# Patient Record
Sex: FEMALE | Race: BLACK OR AFRICAN AMERICAN | Employment: OTHER | URBAN - METROPOLITAN AREA
[De-identification: names, ages, dates, MRNs, and addresses within clinical notes are randomized per-mention and may not be internally consistent; named-entity substitution may affect disease eponyms.]

---

## 2017-01-04 RX ORDER — CYCLOBENZAPRINE HCL 5 MG
TABLET ORAL
Qty: 30 TAB | Refills: 3 | Status: SHIPPED | OUTPATIENT
Start: 2017-01-04 | End: 2019-04-19

## 2017-01-11 RX ORDER — ROSUVASTATIN CALCIUM 10 MG/1
TABLET, COATED ORAL
Qty: 30 TAB | Refills: 11 | Status: SHIPPED | OUTPATIENT
Start: 2017-01-11 | End: 2018-01-24 | Stop reason: SDUPTHER

## 2017-01-11 RX ORDER — SPIRONOLACTONE 25 MG/1
TABLET ORAL
Qty: 30 TAB | Refills: 5 | Status: SHIPPED | OUTPATIENT
Start: 2017-01-11 | End: 2017-08-07 | Stop reason: SDUPTHER

## 2017-01-11 RX ORDER — AMLODIPINE BESYLATE 5 MG/1
TABLET ORAL
Qty: 30 TAB | Refills: 5 | Status: SHIPPED | OUTPATIENT
Start: 2017-01-11 | End: 2017-08-07 | Stop reason: SDUPTHER

## 2017-03-24 ENCOUNTER — OFFICE VISIT (OUTPATIENT)
Dept: INTERNAL MEDICINE CLINIC | Age: 67
End: 2017-03-24

## 2017-03-24 VITALS
RESPIRATION RATE: 16 BRPM | HEIGHT: 63 IN | HEART RATE: 80 BPM | DIASTOLIC BLOOD PRESSURE: 81 MMHG | BODY MASS INDEX: 40.72 KG/M2 | OXYGEN SATURATION: 99 % | WEIGHT: 229.8 LBS | TEMPERATURE: 97.9 F | SYSTOLIC BLOOD PRESSURE: 119 MMHG

## 2017-03-24 DIAGNOSIS — E78.5 HYPERLIPIDEMIA, UNSPECIFIED HYPERLIPIDEMIA TYPE: ICD-10-CM

## 2017-03-24 DIAGNOSIS — I63.9 CEREBROVASCULAR ACCIDENT (CVA), UNSPECIFIED MECHANISM (HCC): Primary | ICD-10-CM

## 2017-03-24 DIAGNOSIS — R55 SYNCOPE AND COLLAPSE: ICD-10-CM

## 2017-03-24 DIAGNOSIS — I10 ESSENTIAL HYPERTENSION: ICD-10-CM

## 2017-03-24 DIAGNOSIS — M10.9 GOUT, UNSPECIFIED CAUSE, UNSPECIFIED CHRONICITY, UNSPECIFIED SITE: ICD-10-CM

## 2017-03-24 RX ORDER — MOMETASONE FUROATE 1 MG/G
CREAM TOPICAL DAILY
Qty: 45 G | Refills: 5 | Status: SHIPPED | OUTPATIENT
Start: 2017-03-24 | End: 2018-04-13 | Stop reason: SDUPTHER

## 2017-03-24 NOTE — PATIENT INSTRUCTIONS
Learning About Healthy Weight  What is a healthy weight? A healthy weight is the weight at which you feel good about yourself and have energy for work and play. It's also one that lowers your risk for health problems. What can you do to stay at a healthy weight? It can be hard to stay at a healthy weight, especially when fast food, vending-machine snacks, and processed foods are so easy to find. And with your busy lifestyle, activity may be low on your list of things to do. But staying at a healthy weight may be easier than you think. Here are some dos and don'ts for staying at a healthy weight:  Do eat healthy foods  The kinds of foods you eat have a big impact on both your weight and your health. Reaching and staying at a healthy weight is not about going on a diet. It's about making healthier food choices every day and changing your diet for good. Healthy eating means eating a variety of foods so that you get all the nutrients you need. Your body needs protein, carbohydrate, and fats for energy. They keep your heart beating, your brain active, and your muscles working. On most days, try to eat from each food group. This means eating a variety of:  · Whole grains, such as whole wheat breads and pastas. · Fruits and vegetables. · Dairy products, such as low-fat milk, yogurt, and cheese. · Lean proteins, such as all types of fish, chicken without the skin, and beans. Don't have too much or too little of one thing. All foods, if eaten in moderation, can be part of healthy eating. Even sweets can be okay. If your favorite foods are high in fat, salt, sugar, or calories, limit how often you eat them. Eat smaller servings, or look for healthy substitutes. Do watch what you eat  Many people eat more than their bodies need. Part of staying at a healthy weight means learning how much food you really need from day to day and not eating more than that.  Even with healthy foods, eating too much can make you gain weight. Having a well-balanced diet means that you eat enough, but not too much, and that your food gives you the nutrients you need to stay healthy. So listen to your body. Eat when you're hungry. Stop when you feel satisfied. It's a good idea to have healthy snacks ready for when you get hungry. Keep healthy snacks with you at work, in your car, and at home. If you have a healthy snack easily available, you'll be less likely to pick a candy bar or bag of chips from a vending machine instead. Some healthy snacks you might want to keep on hand are fruit, low-fat yogurt, string cheese, low-fat microwave popcorn, raisins and other dried fruit, nuts, whole wheat crackers, pretzels, carrots, celery sticks, and broccoli. Do some physical activity  A big part of reaching and staying at a healthy weight is being active. When you're active, you burn calories. This makes it easier to reach and stay at a healthy weight. When you're active on a regular basis, your body burns more calories, even when you're at rest. Being active helps you lose fat and build lean muscle. Try to be active for at least 1 hour every day. This may sound like a lot, but it's okay to be active in smaller blocks of time that add up to 1 hour a day. Any activity that makes your heart beat faster and keeps it there for a while counts. A brisk walk, run, or swim will get your heart beating faster. So will climbing stairs, shooting baskets, or cycling. Even some household chores like vacuuming and mowing the lawn will get your heart rate up. Pick activities that you enjoyones that make your heart beat faster, your muscles stronger, and your muscles and joints more flexible. If you find more than one thing you like doing, do them all. You don't have to do the same thing every day. Don't diet  Diets don't work. Diets are temporary. Because you give up so much when you diet, you may be hungry and think about food all the time.  And after you stop dieting, you also may overeat to make up for what you missed. Most people who diet end up gaining back the pounds they lostand more. Remember that healthy bodies come in lots of shapes and sizes. Everyone can get healthier by eating better and being more active. Where can you learn more? Go to http://lanre-jarocho.info/. Enter 062 4782 in the search box to learn more about \"Learning About Healthy Weight. \"  Current as of: February 16, 2016  Content Version: 11.1  © 5939-8767 web care [employer]. Care instructions adapted under license by [employer] (which disclaims liability or warranty for this information). If you have questions about a medical condition or this instruction, always ask your healthcare professional. [address] any warranty or liability for your use of this information.

## 2017-03-24 NOTE — PROGRESS NOTES
1. Have you been to the ER, urgent care clinic since your last visit? Hospitalized since your last visit?no    2. Have you seen or consulted any other health care providers outside of the [address] [place] [address] since your last visit? Include any pap smears or colon screening.  no

## 2017-03-24 NOTE — MR AVS SNAPSHOT
Visit Information Date & Time Provider Department Dept. Phone Encounter #  
 3/24/2017  9:45 AM Demetri Velasco, [address] [place] [address],[address] [phone] [id number] Follow-up Instructions Return in about 6 months (around 9/24/2017) for HTN, etc.  
 Follow-up and Disposition History Your Appointments 5/26/2017  1:40 PM  
Follow Up with Asad Potts MD  
Neurology [facility] [address] ([address]) Appt Note: follow up for stroke. ..tlw 12/27/16; follow up for stroke. ..tlw 12/27/16     resc ff[place] [id number] office  sld 3/18/17 [place] [address] [facility] [id number] [phone]  
  
   
 [place] [address] [id number] [address] [place] [address] Upcoming Health Maintenance Date Due COLONOSCOPY 4/10/1967 GLAUCOMA SCREENING Q2Y 4/10/2014 OSTEOPOROSIS SCREENING (DEXA) 4/10/2014 MEDICARE YEARLY EXAM 4/10/2014 BREAST CANCER SCRN MAMMOGRAM 6/1/2014 Pneumococcal 65+ Low/Medium Risk (2 of 2 - PPSV23) 3/24/2018 DTaP/Tdap/Td series (2 - Td) 3/24/2027 Allergies as of 3/24/2017  Review Complete On: 3/24/2017 By: Demetri Velasco MD  
  
 Severity Noted Reaction Type Reactions Contrast Agent [Iodine]  09/23/2011    Hives Lipitor [Atorvastatin]  10/03/2014    Myalgia Pravastatin  05/24/2013    Rash Tramadol  12/15/2015    Other (comments) Caused seizure Current Immunizations  Never Reviewed No immunizations on file. Not reviewed this visit You Were Diagnosed With   
  
 Codes Comments Cerebrovascular accident (CVA), unspecified mechanism ([facility] [address]    -  Primary ICD-10-CM: I63.9 ICD-9-CM: 434.91 Syncope and collapse     ICD-10-CM: R55 
ICD-9-CM: 780.2 Gout, unspecified cause, unspecified chronicity, unspecified site     ICD-10-CM: M10.9 ICD-9-CM: 274.9 Essential hypertension     ICD-10-CM: I10 
ICD-9-CM: 401.9 Hyperlipidemia, unspecified hyperlipidemia type     ICD-10-CM: E78.5 ICD-9-CM: 272.4 Vitals BP Pulse Temp Resp Height(growth percentile) Weight(growth percentile) 119/81 (BP 1 Location: Left arm, BP Patient Position: Sitting) 80 97.9 °F (36.6 °C) (Oral) 16 5' 3\" (1.6 m) 229 lb 12.8 oz (104.2 kg) SpO2 BMI OB Status Smoking Status 99% 40.71 kg/m2 Hysterectomy Never Smoker Vitals History BMI and BSA Data Body Mass Index Body Surface Area 40.71 kg/m 2 2.15 m 2 Preferred Pharmacy Pharmacy Name Phone [id number] [address] [address] [place] [phone] Your Updated Medication List  
  
   
This list is accurate as of: 3/24/17 11:59 PM.  Always use your most recent med list. amLODIPine 5 mg tablet Commonly known as:  NORVASC  
take 1 tablet by mouth once daily  
  
 aspirin delayed-release 81 mg tablet Take 81 mg by mouth daily. colchicine 0.6 mg tablet Commonly known as:  COLCRYS Take 1 tablet by mouth two (2) times a day. * cyclobenzaprine 10 mg tablet Commonly known as:  FLEXERIL Take 1 Tab by mouth three (3) times daily as needed for Muscle Spasm(s). * cyclobenzaprine 5 mg tablet Commonly known as:  FLEXERIL  
take 1 tablet by mouth three times a day if needed for muscle spasm HYDROcodone-acetaminophen  mg tablet Commonly known as:  [address] Take 1 Tab by mouth every six (6) hours as needed for Pain. Max Daily Amount: 4 Tabs. levETIRAcetam 500 mg tablet Commonly known as:  KEPPRA Take 2 Tabs by mouth two (2) times a day. losartan 100 mg tablet Commonly known as:  COZAAR Take 1 Tab by mouth daily. mineral oil-hydrophil petrolat ointment Commonly known as:  AQUAPHOR Apply  to affected area as needed for Dry Skin.  
  
 mometasone 0.1 % topical cream  
Commonly known as:  [place] F[place] Apply  to affected area daily. naproxen 500 mg tablet Commonly known as:  NAPROSYN  
 Take 1 Tab by mouth two (2) times daily (with meals). ofloxacin 0.3 % ophthalmic solution Commonly known as:  FLOXIN  
instill 1 drop into affected eye four times a day START 2 DAYS BEFORE SURGERY  
  
 prednisoLONE acetate 1 % ophthalmic suspension Commonly known as:  PRED FORTE  
instill 1 drop into affected eye four times a day AFTER SURGERY FOR 1 WEEK THEN TAPER AS DIRECTED  
  
 rosuvastatin 10 mg tablet Commonly known as:  CRESTOR  
take 1 tablet by mouth once daily  
  
 spironolactone 25 mg tablet Commonly known as:  ALDACTONE  
take 1 tablet by mouth once daily  
  
 timolol 0.25 % ophthalmic solution Commonly known as:  Marlee Revering Administer 1-2 drops to both eyes two (2) times a day. use in affected eye(s)  
  
 triamcinolone acetonide 0.1 % ointment Commonly known as:  KENALOG Apply  to affected area two (2) times a day. use thin layer * Notice: This list has 2 medication(s) that are the same as other medications prescribed for you. Read the directions carefully, and ask your doctor or other care provider to review them with you. Prescriptions Sent to Pharmacy Refills  
 mometasone (ELOCON) 0.1 % topical cream 5 Sig: Apply  to affected area daily. Class: Normal  
 Pharmacy: [facility] [address] [place] [address] #: [phone] Route: Topical  
  
We Performed the Following CBC WITH AUTOMATED DIFF [00594 CPT(R)] LIPID PANEL [07662 CPT(R)] METABOLIC PANEL, COMPREHENSIVE [87245 CPT(R)] URIC ACID [id number] CPT(R)] Follow-up Instructions Return in about 6 months (around 9/24/2017) for HTN, etc.  
  
  
Patient Instructions Learning About Healthy Weight What is a healthy weight? A healthy weight is the weight at which you feel good about yourself and have energy for work and play. It's also one that lowers your risk for health problems. What can you do to stay at a healthy weight? It can be hard to stay at a healthy weight, especially when fast food, vending-machine snacks, and processed foods are so easy to find. And with your busy lifestyle, activity may be low on your list of things to do. But staying at a healthy weight may be easier than you think. Here are some dos and don'ts for staying at a healthy weight: 
Do eat healthy foods The kinds of foods you eat have a big impact on both your weight and your health. Reaching and staying at a healthy weight is not about going on a diet. It's about making healthier food choices every day and changing your diet for good. Healthy eating means eating a variety of foods so that you get all the nutrients you need. Your body needs protein, carbohydrate, and fats for energy. They keep your heart beating, your brain active, and your muscles working. On most days, try to eat from each food group. This means eating a variety of: · Whole grains, such as whole wheat breads and pastas. · Fruits and vegetables. · Dairy products, such as low-fat milk, yogurt, and cheese. · Lean proteins, such as all types of fish, chicken without the skin, and beans. Don't have too much or too little of one thing. All foods, if eaten in moderation, can be part of healthy eating. Even sweets can be okay. If your favorite foods are high in fat, salt, sugar, or calories, limit how often you eat them. Eat smaller servings, or look for healthy substitutes. Do watch what you eat Many people eat more than their bodies need. Part of staying at a healthy weight means learning how much food you really need from day to day and not eating more than that. Even with healthy foods, eating too much can make you gain weight. Having a well-balanced diet means that you eat enough, but not too much, and that your food gives you the nutrients you need to stay healthy. So listen to your body. Eat when you're hungry. Stop when you feel satisfied. It's a good idea to have healthy snacks ready for when you get hungry. Keep healthy snacks with you at work, in your car, and at home. If you have a healthy snack easily available, you'll be less likely to pick a candy bar or bag of chips from a vending machine instead. Some healthy snacks you might want to keep on hand are fruit, low-fat yogurt, string cheese, low-fat microwave popcorn, raisins and other dried fruit, nuts, whole wheat crackers, pretzels, carrots, celery sticks, and broccoli. Do some physical activity A big part of reaching and staying at a healthy weight is being active. When you're active, you burn calories. This makes it easier to reach and stay at a healthy weight. When you're active on a regular basis, your body burns more calories, even when you're at rest. Being active helps you lose fat and build lean muscle. Try to be active for at least 1 hour every day. This may sound like a lot, but it's okay to be active in smaller blocks of time that add up to 1 hour a day. Any activity that makes your heart beat faster and keeps it there for a while counts. A brisk walk, run, or swim will get your heart beating faster. So will climbing stairs, shooting baskets, or cycling. Even some household chores like vacuuming and mowing the lawn will get your heart rate up. Pick activities that you enjoyones that make your heart beat faster, your muscles stronger, and your muscles and joints more flexible. If you find more than one thing you like doing, do them all. You don't have to do the same thing every day. Don't diet Diets don't work. Diets are temporary. Because you give up so much when you diet, you may be hungry and think about food all the time. And after you stop dieting, you also may overeat to make up for what you missed. Most people who diet end up gaining back the pounds they lostand more. Remember that healthy bodies come in lots of shapes and sizes.  Everyone can get healthier by eating better and being more active. Where can you learn more? Go to http://lanre-jarocho.info/. Enter 449 4149 in the search box to learn more about \"Learning About Healthy Weight. \" Current as of: February 16, 2016 Content Version: 11.1 © 8192-3733 [employer] Incorporated. Care instructions adapted under license by [employer] (which disclaims liability or warranty for this information). If you have questions about a medical condition or this instruction, always ask your healthcare professional. [address] any warranty or liability for your use of this information. Introducing [facility]! Carmen Shaikh introduces [employer] patient portal. Now you can access parts of your medical record, email your doctor's office, and request medication refills online. 1. In your internet browser, go to [email or web address] 2. Click on the First Time User? Click Here link in the Sign In box. You will see the New Member Sign Up page. 3. Enter your [employer] Access Code exactly as it appears below. You will not need to use this code after youve completed the sign-up process. If you do not sign up before the expiration date, you must request a new code. · [employer] Access Code: [email or web address] Expires: 6/22/2017 10:41 AM 
 
4. Enter the last four digits of your Social Security Number (xxxx) and Date of Birth (mm/dd/yyyy) as indicated and click Submit. You will be taken to the next sign-up page. 5. Create a [employer] ID. This will be your [employer] login ID and cannot be changed, so think of one that is secure and easy to remember. 6. Create a [employer] password. You can change your password at any time. 7. Enter your Password Reset Question and Answer. This can be used at a later time if you forget your password. 8. Enter your e-mail address. You will receive e-mail notification when new information is available in [address] 9. Click Sign Up. You can now view and download portions of your medical record. 10. Click the Download Summary menu link to download a portable copy of your medical information. If you have questions, please visit the Frequently Asked Questions section of the [employer] website. Remember, [employer] is NOT to be used for urgent needs. For medical emergencies, dial 911. Now available from your iPhone and Android! Please provide this summary of care documentation to your next provider. Your primary care clinician is listed as [place]. If you have any questions after today's visit, please call [phone].
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
## 2017-04-05 ENCOUNTER — TELEPHONE (OUTPATIENT)
Dept: INTERNAL MEDICINE CLINIC | Age: 67
End: 2017-04-05

## 2017-05-26 ENCOUNTER — OFFICE VISIT (OUTPATIENT)
Dept: NEUROLOGY | Age: 67
End: 2017-05-26

## 2017-05-26 VITALS
HEIGHT: 63 IN | RESPIRATION RATE: 12 BRPM | BODY MASS INDEX: 39.87 KG/M2 | OXYGEN SATURATION: 98 % | WEIGHT: 225 LBS | DIASTOLIC BLOOD PRESSURE: 80 MMHG | SYSTOLIC BLOOD PRESSURE: 132 MMHG | HEART RATE: 93 BPM

## 2017-05-26 DIAGNOSIS — I63.9 CEREBROVASCULAR ACCIDENT (CVA), UNSPECIFIED MECHANISM (HCC): ICD-10-CM

## 2017-05-26 DIAGNOSIS — R56.1 POST-TRAUMATIC SEIZURES (HCC): Primary | ICD-10-CM

## 2017-05-26 DIAGNOSIS — M54.50 ACUTE MIDLINE LOW BACK PAIN WITHOUT SCIATICA: ICD-10-CM

## 2017-05-26 DIAGNOSIS — R56.1 POST-TRAUMATIC SEIZURES (HCC): ICD-10-CM

## 2017-05-26 DIAGNOSIS — I63.9 CEREBROVASCULAR ACCIDENT (CVA), UNSPECIFIED MECHANISM (HCC): Primary | ICD-10-CM

## 2017-05-26 RX ORDER — GABAPENTIN 300 MG/1
300 CAPSULE ORAL
Qty: 30 CAP | Refills: 5 | Status: SHIPPED | OUTPATIENT
Start: 2017-05-26 | End: 2019-04-19

## 2017-05-26 RX ORDER — LEVETIRACETAM 500 MG/1
500 TABLET ORAL 2 TIMES DAILY
Qty: 60 TAB | Refills: 5
Start: 2017-05-26 | End: 2017-07-08 | Stop reason: SDUPTHER

## 2017-05-26 NOTE — MR AVS SNAPSHOT
Visit Information Date & Time Provider Department Dept. Phone Encounter #  
 5/26/2017  1:40 PM Sylvain Brennan MD Neurology [address] [phone] [id number] Your Appointments 5/26/2017  1:40 PM  
Follow Up with Sylvain Brennan MD  
Neurology [address] ([facility]) Appt Note: follow up for stroke. ..tlw 12/27/16; follow up for stroke. ..tlw 12/27/16     [facility]  sld 3/18/17 [place] [address] [facility] [address] [address] [phone]  
  
   
 [place] [address] [id number] [address] [place] Upcoming Health Maintenance Date Due COLONOSCOPY 4/10/1967 GLAUCOMA SCREENING Q2Y 4/10/2014 OSTEOPOROSIS SCREENING (DEXA) 4/10/2014 MEDICARE YEARLY EXAM 4/10/2014 BREAST CANCER SCRN MAMMOGRAM 6/1/2014 INFLUENZA AGE 9 TO ADULT 8/1/2017 Pneumococcal 65+ Low/Medium Risk (2 of 2 - PPSV23) 3/24/2018 DTaP/Tdap/Td series (2 - Td) 3/24/2027 Allergies as of 5/26/2017  Review Complete On: 5/26/2017 By: Lucia Palma Severity Noted Reaction Type Reactions Contrast Agent [Iodine]  09/23/2011    Hives Lipitor [Atorvastatin]  10/03/2014    Myalgia Pravastatin  05/24/2013    Rash Tramadol  12/15/2015    Other (comments) Caused seizure Current Immunizations  Never Reviewed No immunizations on file. Not reviewed this visit You Were Diagnosed With   
  
 Codes Comments Cerebrovascular accident (CVA), unspecified mechanism ([address]    -  Primary ICD-10-CM: I63.9 ICD-9-CM: 434.91 Post-traumatic seizures ([facility] [address]     ICD-10-CM: R56.1 ICD-9-CM: 780.33 Acute midline low back pain without sciatica     ICD-10-CM: M54.5 ICD-9-CM: 724.2 Vitals BP Pulse Resp Height(growth percentile) Weight(growth percentile) SpO2  
 132/80 93 12 5' 3\" (1.6 m) 225 lb (102.1 kg) 98% BMI OB Status Smoking Status 39.86 kg/m2 Hysterectomy Never Smoker Vitals History BMI and BSA Data Body Mass Index Body Surface Area  
 39.86 kg/m 2 2.13 m 2 Preferred Pharmacy Pharmacy Name Phone [id number] [facility] [place] [address] [place] [phone] Your Updated Medication List  
  
   
This list is accurate as of: 5/26/17  1:35 PM.  Always use your most recent med list. amLODIPine 5 mg tablet Commonly known as:  NORVASC  
take 1 tablet by mouth once daily  
  
 aspirin delayed-release 81 mg tablet Take 81 mg by mouth daily. colchicine 0.6 mg tablet Commonly known as:  COLCRYS Take 1 tablet by mouth two (2) times a day. * cyclobenzaprine 10 mg tablet Commonly known as:  FLEXERIL Take 1 Tab by mouth three (3) times daily as needed for Muscle Spasm(s). * cyclobenzaprine 5 mg tablet Commonly known as:  FLEXERIL  
take 1 tablet by mouth three times a day if needed for muscle spasm  
  
 gabapentin 300 mg capsule Commonly known as:  NEURONTIN Take 1 Cap by mouth nightly. HYDROcodone-acetaminophen  mg tablet Commonly known as:  Alexandr Nigh Take 1 Tab by mouth every six (6) hours as needed for Pain. Max Daily Amount: 4 Tabs. levETIRAcetam 500 mg tablet Commonly known as:  KEPPRA Take 1 Tab by mouth two (2) times a day. LISINOPRIL PO Take  by mouth.  
  
 losartan 100 mg tablet Commonly known as:  COZAAR Take 1 Tab by mouth daily. mineral oil-hydrophil petrolat ointment Commonly known as:  AQUAPHOR Apply  to affected area as needed for Dry Skin.  
  
 mometasone 0.1 % topical cream  
Commonly known as:  Sammy New Apply  to affected area daily. naproxen 500 mg tablet Commonly known as:  NAPROSYN Take 1 Tab by mouth two (2) times daily (with meals). ofloxacin 0.3 % ophthalmic solution Commonly known as:  FLOXIN  
instill 1 drop into affected eye four times a day START 2 DAYS BEFORE SURGERY prednisoLONE acetate 1 % ophthalmic suspension Commonly known as:  PRED FORTE  
instill 1 drop into affected eye four times a day AFTER SURGERY FOR 1 WEEK THEN TAPER AS DIRECTED  
  
 rosuvastatin 10 mg tablet Commonly known as:  CRESTOR  
take 1 tablet by mouth once daily  
  
 spironolactone 25 mg tablet Commonly known as:  ALDACTONE  
take 1 tablet by mouth once daily  
  
 timolol 0.25 % ophthalmic solution Commonly known as:  Shila Carl Administer 1-2 drops to both eyes two (2) times a day. use in affected eye(s)  
  
 triamcinolone acetonide 0.1 % ointment Commonly known as:  KENALOG Apply  to affected area two (2) times a day. use thin layer * Notice: This list has 2 medication(s) that are the same as other medications prescribed for you. Read the directions carefully, and ask your doctor or other care provider to review them with you. Prescriptions Sent to Pharmacy Refills  
 gabapentin (NEURONTIN) 300 mg capsule 5 Sig: Take 1 Cap by mouth nightly. Class: Normal  
 [id number] [address] [place] [address] Ph #: [phone] Route: Oral  
  
To-Do List   
 05/27/2017 Imaging:  DUPLEX CAROTID BILATERAL AMB NEURO   
  
 05/27/2017 Imaging:  MRI LUMB SPINE W WO CONT Patient Instructions A Healthy Lifestyle: Care Instructions Your Care Instructions A healthy lifestyle can help you feel good, stay at a healthy weight, and have plenty of energy for both work and play. A healthy lifestyle is something you can share with your whole family. A healthy lifestyle also can lower your risk for serious health problems, such as high blood pressure, heart disease, and diabetes. You can follow a few steps listed below to improve your health and the health of your family. Follow-up care is a key part of your treatment and safety.  Be sure to make and go to all appointments, and call your doctor if you are having problems. Its also a good idea to know your test results and keep a list of the medicines you take. How can you care for yourself at home? · Do not eat too much sugar, fat, or fast foods. You can still have dessert and treats now and then. The goal is moderation. · Start small to improve your eating habits. Pay attention to portion sizes, drink less juice and soda pop, and eat more fruits and vegetables. ¨ Eat a healthy amount of food. A 3-ounce serving of meat, for example, is about the size of a deck of cards. Fill the rest of your plate with vegetables and whole grains. ¨ Limit the amount of soda and sports drinks you have every day. Drink more water when you are thirsty. ¨ Eat at least 5 servings of fruits and vegetables every day. It may seem like a lot, but it is not hard to reach this goal. A serving or helping is 1 piece of fruit, 1 cup of vegetables, or 2 cups of leafy, raw vegetables. Have an apple or some carrot sticks as an afternoon snack instead of a candy bar. Try to have fruits and/or vegetables at every meal. 
· Make exercise part of your daily routine. You may want to start with simple activities, such as walking, bicycling, or slow swimming. Try to be active 30 to 60 minutes every day. You do not need to do all 30 to 60 minutes all at once. For example, you can exercise 3 times a day for 10 or 20 minutes. Moderate exercise is safe for most people, but it is always a good idea to talk to your doctor before starting an exercise program. 
· Keep moving. Cm Rafa the lawn, work in the garden, or [employer]. Take the stairs instead of the elevator at work. · If you smoke, quit. People who smoke have an increased risk for heart attack, stroke, cancer, and other lung illnesses. Quitting is hard, but there are ways to boost your chance of quitting tobacco for good. ¨ Use nicotine gum, patches, or lozenges. ¨ Ask your doctor about stop-smoking programs and medicines. ¨ Keep trying. In addition to reducing your risk of diseases in the future, you will notice some benefits soon after you stop using tobacco. If you have shortness of breath or asthma symptoms, they will likely get better within a few weeks after you quit. · Limit how much alcohol you drink. Moderate amounts of alcohol (up to 2 drinks a day for men, 1 drink a day for women) are okay. But drinking too much can lead to liver problems, high blood pressure, and other health problems. Family health If you have a family, there are many things you can do together to improve your health. · Eat meals together as a family as often as possible. · Eat healthy foods. This includes fruits, vegetables, lean meats and dairy, and whole grains. · Include your family in your fitness plan. Most people think of activities such as jogging or tennis as the way to fitness, but there are many ways you and your family can be more active. Anything that makes you breathe hard and gets your heart pumping is exercise. Here are some tips: 
¨ Walk to do errands or to take your child to school or the bus. ¨ Go for a family bike ride after dinner instead of watching TV. Where can you learn more? Go to http://lanre-jarocho.info/. Enter D402 in the search box to learn more about \"A Healthy Lifestyle: Care Instructions. \" Current as of: July 26, 2016 Content Version: 11.2 © 8836-6944 Healthwise, Incorporated. Care instructions adapted under license by [employer] (which disclaims liability or warranty for this information). If you have questions about a medical condition or this instruction, always ask your healthcare professional. [place] [address] any warranty or liability for your use of this information. Introducing [facility] [place] [facility]! Raissa Huerta introduces Cellufun patient portal. Now you can access parts of your medical record, email your doctor's office, and request medication refills online. 1. In your internet browser, go to [email or web address] 2. Click on the First Time User? Click Here link in the Sign In box. You will see the New Member Sign Up page. 3. Enter your Stockr Access Code exactly as it appears below. You will not need to use this code after youve completed the sign-up process. If you do not sign up before the expiration date, you must request a new code. · Stockr Access Code: [email or web address] Expires: 6/22/2017 10:41 AM 
 
4. Enter the last four digits of your Social Security Number (xxxx) and Date of Birth (mm/dd/yyyy) as indicated and click Submit. You will be taken to the next sign-up page. 5. Create a Telestreamt ID. This will be your Stockr login ID and cannot be changed, so think of one that is secure and easy to remember. 6. Create a Stockr password. You can change your password at any time. 7. Enter your Password Reset Question and Answer. This can be used at a later time if you forget your password. 8. Enter your e-mail address. You will receive e-mail notification when new information is available in [address] 9. Click Sign Up. You can now view and download portions of your medical record. 10. Click the Download Summary menu link to download a portable copy of your medical information. If you have questions, please visit the Frequently Asked Questions section of the Stockr website. Remember, Stockr is NOT to be used for urgent needs. For medical emergencies, dial 911. Now available from your iPhone and Android! Please provide this summary of care documentation to your next provider. Your primary care clinician is listed as [place]. If you have any questions after today's visit, please call [phone].
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
## 2017-05-26 NOTE — PROCEDURES
Carotid Doppler:     Date:  05/26/17    Requesting Physician:  Angelique Gusman     Indication:  Stroke. B-mode imaging reveals mixed plaquing at the bifurcations extending into the proximal internal carotid artery segments bilaterally. Doppler spectral analysis reveals no significantly elevated velocity. Vertebral artery flow antegrade bilaterally. Interpretation:  Plaque as noted. Stenosis estimated at 16-49% bilateral ICA.
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
## 2017-05-26 NOTE — PATIENT INSTRUCTIONS

## 2017-06-16 ENCOUNTER — HOSPITAL ENCOUNTER (OUTPATIENT)
Dept: MRI IMAGING | Age: 67
Discharge: HOME | End: 2017-06-16
Attending: PSYCHIATRY & NEUROLOGY
Payer: COMMERCIAL

## 2017-06-16 DIAGNOSIS — R56.1 POST-TRAUMATIC SEIZURES (HCC): ICD-10-CM

## 2017-06-16 DIAGNOSIS — I63.9 CEREBROVASCULAR ACCIDENT (CVA), UNSPECIFIED MECHANISM (HCC): ICD-10-CM

## 2017-06-16 DIAGNOSIS — M54.50 ACUTE MIDLINE LOW BACK PAIN WITHOUT SCIATICA: ICD-10-CM

## 2017-06-16 PROCEDURE — 72148 MRI LUMBAR SPINE W/O DYE: CPT

## 2017-06-27 ENCOUNTER — TELEPHONE (OUTPATIENT)
Dept: NEUROLOGY | Age: 67
End: 2017-06-27

## 2017-06-27 DIAGNOSIS — M54.50 ACUTE MIDLINE LOW BACK PAIN WITHOUT SCIATICA: Primary | ICD-10-CM

## 2017-06-28 NOTE — TELEPHONE ENCOUNTER
MRI of the lumbar spine did show several areas of disc disease. At one level there is definite narrowing. This could be causing patient's pain and weakness. If she would like, we can refer her to neurosurgery for evaluation.

## 2017-06-29 ENCOUNTER — TELEPHONE (OUTPATIENT)
Dept: NEUROLOGY | Age: 67
End: 2017-06-29

## 2017-06-29 NOTE — TELEPHONE ENCOUNTER
Contacted patient and informed her of results. She would like a referral to Dr. Valentin Salmon. Provided patient Dr. Hernán Wheatley phone number and advised she call if she does not receive a call. She voiced understanding and will call back if any further questions or concerns. Referral and clinicals faxed to Dr. Hernán Wheatley office.

## 2017-06-29 NOTE — TELEPHONE ENCOUNTER
Spoke with patient and provided her results again. Advised her to contact the radiology dept to obtain the disk with imaging. She voiced understanding and will call back if any further questions or concerns.

## 2017-07-09 RX ORDER — LEVETIRACETAM 500 MG/1
TABLET ORAL
Qty: 120 TAB | Refills: 5 | Status: SHIPPED | OUTPATIENT
Start: 2017-07-09 | End: 2017-07-18

## 2017-07-18 ENCOUNTER — APPOINTMENT (OUTPATIENT)
Dept: CT IMAGING | Age: 67
End: 2017-07-18
Attending: EMERGENCY MEDICINE
Payer: COMMERCIAL

## 2017-07-18 ENCOUNTER — HOSPITAL ENCOUNTER (EMERGENCY)
Age: 67
Discharge: HOME | End: 2017-07-18
Attending: EMERGENCY MEDICINE
Payer: COMMERCIAL

## 2017-07-18 VITALS
HEART RATE: 86 BPM | TEMPERATURE: 97.8 F | WEIGHT: 235.01 LBS | DIASTOLIC BLOOD PRESSURE: 94 MMHG | BODY MASS INDEX: 43.25 KG/M2 | HEIGHT: 62 IN | OXYGEN SATURATION: 100 % | RESPIRATION RATE: 18 BRPM | SYSTOLIC BLOOD PRESSURE: 125 MMHG

## 2017-07-18 DIAGNOSIS — R10.13 ABDOMINAL PAIN, EPIGASTRIC: Primary | ICD-10-CM

## 2017-07-18 LAB
ALBUMIN SERPL BCP-MCNC: 3.4 G/DL (ref 3.5–5)
ALBUMIN/GLOB SERPL: 0.6 {RATIO} (ref 1.1–2.2)
ALP SERPL-CCNC: 102 U/L (ref 45–117)
ALT SERPL-CCNC: 59 U/L (ref 12–78)
ANION GAP BLD CALC-SCNC: 3 MMOL/L (ref 5–15)
AST SERPL W P-5'-P-CCNC: 56 U/L (ref 15–37)
BASOPHILS # BLD AUTO: 0 K/UL (ref 0–0.1)
BASOPHILS # BLD: 1 % (ref 0–1)
BILIRUB SERPL-MCNC: 0.7 MG/DL (ref 0.2–1)
BUN SERPL-MCNC: 13 MG/DL (ref 6–20)
BUN/CREAT SERPL: 13 (ref 12–20)
CALCIUM SERPL-MCNC: 9.5 MG/DL (ref 8.5–10.1)
CHLORIDE SERPL-SCNC: 107 MMOL/L (ref 97–108)
CO2 SERPL-SCNC: 28 MMOL/L (ref 21–32)
CREAT SERPL-MCNC: 1.02 MG/DL (ref 0.55–1.02)
EOSINOPHIL # BLD: 0.2 K/UL (ref 0–0.4)
EOSINOPHIL NFR BLD: 3 % (ref 0–7)
ERYTHROCYTE [DISTWIDTH] IN BLOOD BY AUTOMATED COUNT: 13.1 % (ref 11.5–14.5)
GLOBULIN SER CALC-MCNC: 5.3 G/DL (ref 2–4)
GLUCOSE SERPL-MCNC: 90 MG/DL (ref 65–100)
HCT VFR BLD AUTO: 42.4 % (ref 35–47)
HGB BLD-MCNC: 15.4 G/DL (ref 11.5–16)
INR PPP: 1.1 (ref 0.9–1.1)
LACTATE SERPL-SCNC: 1 MMOL/L (ref 0.4–2)
LIPASE SERPL-CCNC: 112 U/L (ref 73–393)
LYMPHOCYTES # BLD AUTO: 38 % (ref 12–49)
LYMPHOCYTES # BLD: 2.1 K/UL (ref 0.8–3.5)
MCH RBC QN AUTO: 33.3 PG (ref 26–34)
MCHC RBC AUTO-ENTMCNC: 36.3 G/DL (ref 30–36.5)
MCV RBC AUTO: 91.8 FL (ref 80–99)
MONOCYTES # BLD: 0.7 K/UL (ref 0–1)
MONOCYTES NFR BLD AUTO: 13 % (ref 5–13)
NEUTS SEG # BLD: 2.5 K/UL (ref 1.8–8)
NEUTS SEG NFR BLD AUTO: 45 % (ref 32–75)
PLATELET # BLD AUTO: 193 K/UL (ref 150–400)
POTASSIUM SERPL-SCNC: 4 MMOL/L (ref 3.5–5.1)
PROT SERPL-MCNC: 8.7 G/DL (ref 6.4–8.2)
PROTHROMBIN TIME: 11.5 SEC (ref 9–11.1)
RBC # BLD AUTO: 4.62 M/UL (ref 3.8–5.2)
SODIUM SERPL-SCNC: 138 MMOL/L (ref 136–145)
TROPONIN I SERPL-MCNC: <0.04 NG/ML
WBC # BLD AUTO: 5.5 K/UL (ref 3.6–11)

## 2017-07-18 PROCEDURE — 93005 ELECTROCARDIOGRAM TRACING: CPT

## 2017-07-18 PROCEDURE — 74176 CT ABD & PELVIS W/O CONTRAST: CPT

## 2017-07-18 PROCEDURE — 83605 ASSAY OF LACTIC ACID: CPT | Performed by: EMERGENCY MEDICINE

## 2017-07-18 PROCEDURE — 74011250637 HC RX REV CODE- 250/637: Performed by: EMERGENCY MEDICINE

## 2017-07-18 PROCEDURE — 96361 HYDRATE IV INFUSION ADD-ON: CPT

## 2017-07-18 PROCEDURE — 84484 ASSAY OF TROPONIN QUANT: CPT | Performed by: EMERGENCY MEDICINE

## 2017-07-18 PROCEDURE — 74011636320 HC RX REV CODE- 636/320: Performed by: EMERGENCY MEDICINE

## 2017-07-18 PROCEDURE — 83690 ASSAY OF LIPASE: CPT | Performed by: EMERGENCY MEDICINE

## 2017-07-18 PROCEDURE — 36415 COLL VENOUS BLD VENIPUNCTURE: CPT | Performed by: EMERGENCY MEDICINE

## 2017-07-18 PROCEDURE — 96374 THER/PROPH/DIAG INJ IV PUSH: CPT

## 2017-07-18 PROCEDURE — 99285 EMERGENCY DEPT VISIT HI MDM: CPT

## 2017-07-18 PROCEDURE — 74011250636 HC RX REV CODE- 250/636: Performed by: EMERGENCY MEDICINE

## 2017-07-18 PROCEDURE — 85610 PROTHROMBIN TIME: CPT | Performed by: EMERGENCY MEDICINE

## 2017-07-18 PROCEDURE — 85025 COMPLETE CBC W/AUTO DIFF WBC: CPT | Performed by: EMERGENCY MEDICINE

## 2017-07-18 PROCEDURE — 80053 COMPREHEN METABOLIC PANEL: CPT | Performed by: EMERGENCY MEDICINE

## 2017-07-18 RX ORDER — OMEPRAZOLE 20 MG/1
20 CAPSULE, DELAYED RELEASE ORAL DAILY
Qty: 14 CAP | Refills: 0 | Status: SHIPPED | OUTPATIENT
Start: 2017-07-18 | End: 2017-08-01

## 2017-07-18 RX ORDER — RANITIDINE 150 MG/1
150 TABLET, FILM COATED ORAL
Qty: 14 TAB | Refills: 0 | Status: SHIPPED | OUTPATIENT
Start: 2017-07-18 | End: 2017-08-01

## 2017-07-18 RX ORDER — ONDANSETRON 2 MG/ML
4 INJECTION INTRAMUSCULAR; INTRAVENOUS
Status: COMPLETED | OUTPATIENT
Start: 2017-07-18 | End: 2017-07-18

## 2017-07-18 RX ORDER — SODIUM CHLORIDE 9 MG/ML
1000 INJECTION, SOLUTION INTRAVENOUS ONCE
Status: COMPLETED | OUTPATIENT
Start: 2017-07-18 | End: 2017-07-18

## 2017-07-18 RX ORDER — FAMOTIDINE 20 MG/1
20 TABLET, FILM COATED ORAL
Status: COMPLETED | OUTPATIENT
Start: 2017-07-18 | End: 2017-07-18

## 2017-07-18 RX ORDER — ACETAMINOPHEN 500 MG
1000 TABLET ORAL
Qty: 40 TAB | Refills: 0 | Status: SHIPPED | OUTPATIENT
Start: 2017-07-18 | End: 2019-04-19

## 2017-07-18 RX ORDER — PANTOPRAZOLE SODIUM 40 MG/1
40 TABLET, DELAYED RELEASE ORAL
Status: COMPLETED | OUTPATIENT
Start: 2017-07-18 | End: 2017-07-18

## 2017-07-18 RX ADMIN — ALUMINUM HYDROXIDE AND MAGNESIUM HYDROXIDE 30 ML: 200; 200 SUSPENSION ORAL at 19:52

## 2017-07-18 RX ADMIN — ONDANSETRON 4 MG: 2 INJECTION INTRAMUSCULAR; INTRAVENOUS at 17:57

## 2017-07-18 RX ADMIN — DIATRIZOATE MEGLUMINE AND DIATRIZOATE SODIUM 30 ML: 600; 100 SOLUTION ORAL; RECTAL at 17:17

## 2017-07-18 RX ADMIN — SODIUM CHLORIDE 1000 ML: 900 INJECTION, SOLUTION INTRAVENOUS at 17:17

## 2017-07-18 RX ADMIN — FAMOTIDINE 20 MG: 20 TABLET ORAL at 19:52

## 2017-07-18 RX ADMIN — PANTOPRAZOLE SODIUM 40 MG: 40 TABLET, DELAYED RELEASE ORAL at 19:52

## 2017-07-18 NOTE — LETTER
[address] 
[facility] EMERGENCY DEPT 
[address] [facility] [address] [address] [phone] Work/School Note Date: 7/18/2017 To Whom It May concern: Michael Crook was seen and treated today in the emergency room by the following provider(s): 
Attending Provider: Marilin Rizvi MD. Michael Crook may return to work on 7/24/17. Sincerely, Marilin Rizvi MD

## 2017-07-18 NOTE — ED NOTES
Bedside and Verbal shift change report given to [address] [place] [address] Avenue (oncoming nurse) by Mary/MARYJANE Rodas (offgoing nurse). Report included the following information SBAR, ED Summary, Intake/Output, MAR and Recent Results. Monitor x 2. Call bell in reach. Bed in lowest position, with side rails up x 2. Pt updated on plan of care. Family at bedside.

## 2017-07-18 NOTE — ED NOTES
Bedside and Verbal shift change report given to MARYJANE Bullock (oncoming nurse) by Maribeth Joseph RN (offgoing nurse). Report included the following information SBAR, ED Summary, MAR and Recent Results.

## 2017-07-18 NOTE — ED PROVIDER NOTES
HPI Comments: Laina Johns is a 75 y.o. female with PMHx of obesity, and PSHx of lap band, appendectomy, hysterectomy, and cholecystectomy, presenting ambulatory to ED c/o onset of diffuse upper abdominal tight pain since eating a portion of a chicken pot pie from [facility] on 7/14/17. Pt rates current pain 8/10 and notes it is constant. She denies associated N/V and notes she is able to tolerate PO with symptoms. Pt denies worsening pain with ambulation or exertion. She notes history of lap band procedure a few years ago, denies complications, and reports she is not able to eat much at baseline since the lap band. Pt endorses she stopped eating her chicken pot pie from [facility] because she became full and notes onset of abdominal pain 2-3 hours afterward. She denies history of pancreatitis or frequent NSAID use. Pt notes she takes baby ASA daily. She reports a normal BM yesterday. Pt notes history of cholecystectomy. She specifically denies vomiting, diarrhea, blood in her stool, fever, cough, CP, or jaw pain. PCP: Pinky Buenrostro MD  Social Hx: never smoker; - EtOH; - drug use. There are no other complaints, changes, or physical findings at this time. Written by Dea Juarez ED Scribe, as dictated by Renuka Butler MD.          The history is provided by the patient.         Past Medical History:   Diagnosis Date    CAD (coronary artery disease)     MI '98    Cancer ([address] 1976    cervical; no chemo    Cerebral artery occlusion with cerebral infarction ([address]     Epilepsy ([address]     Essential hypertension     Gastrointestinal disorder     Glaucoma     HHD (hypertensive heart disease)     Hypercholesterolemia     Obesity     Seizure ([address]     Stroke ([address]     Thromboembolus ([address] 1998    R leg; same leg as cardiac cath       Past Surgical History:   Procedure Laterality Date    HC LAP BAND ADJUST PROCEDURE  2008    HX APPENDECTOMY      HX CHOLECYSTECTOMY      HX HYSTERECTOMY      HX ORTHOPAEDIC      foot surgery    HX TONSILLECTOMY           Family History:   Problem Relation Age of Onset    Hypertension Mother        Social History     Social History    Marital status:      Spouse name: N/A    Number of children: N/A    Years of education: N/A     Occupational History    Not on file. Social History Main Topics    Smoking status: Never Smoker    Smokeless tobacco: Never Used    Alcohol use No    Drug use: No    Sexual activity: Not on file     Other Topics Concern    Not on file     Social History Narrative         ALLERGIES: Contrast agent [iodine]; Lipitor [atorvastatin]; Pravastatin; and Tramadol    Review of Systems   Constitutional: Negative for chills and fever. HENT: Negative for congestion. Eyes: Negative for visual disturbance. Respiratory: Negative for cough and chest tightness. Cardiovascular: Negative for chest pain and leg swelling. Gastrointestinal: Positive for abdominal pain (diffuse upper). Negative for blood in stool, constipation and vomiting. Endocrine: Negative for polyuria. Genitourinary: Negative for dysuria and frequency. Musculoskeletal: Negative for myalgias. Skin: Negative for color change. Allergic/Immunologic: Negative for immunocompromised state. Neurological: Negative for numbness. All other systems reviewed and are negative. Vitals:    07/18/17 1930 07/18/17 2000 07/18/17 2009 07/18/17 2030   BP: 94/62 122/70  (!) 125/94   Pulse: 87 84  86   Resp: 18 18 18 18   Temp:       SpO2: 97% (!) 81% 99% 100%   Weight:       Height:                Physical Exam     Nursing note and vitals reviewed.   General appearance: non-toxic, NAD  Eyes: PERRL, EOMI, conjunctiva pale, anicteric sclera  HEENT: mucous membranes tacky, oropharynx is clear  Pulmonary: clear to auscultation bilaterally  Cardiac: normal rate and regular rhythm, no murmurs, gallops, or rubs, 2+DP pulses, 2+ radial pulses  Abdomen: soft, TTP across upper abdomen, nondistended, bowel sounds present  MSK: no pre-tibial edema, hammer toes to left 2nd and 3rd toes, no CVA tenderness  Neuro: Alert, answers questions appropriately  Skin: capillary refill brisk, hyperpigmentation of BLE's (R>L) consistent with venous stasis     MDM  Number of Diagnoses or Management Options  Abdominal pain, epigastric:   Diagnosis management comments: DDx: pancreatitis, gastritis, lap band malfunction, colitis, diverticulitis; lower suspicion for anginal equivalent. CT negative. Symptoms improved. Advised pt to avoid NSAID's, coffee, tea, soda; trial of GI protective therapy. F/u with PCP and GI. Amount and/or Complexity of Data Reviewed  Clinical lab tests: ordered and reviewed  Tests in the radiology section of CPT®: ordered and reviewed  Tests in the medicine section of CPT®: ordered and reviewed  Review and summarize past medical records: yes  Independent visualization of images, tracings, or specimens: yes    Patient Progress  Patient progress: stable    Procedures    Progress Note:  4:20 PM  Pt declines pain medication at the time of exam.  Written by MARQUISE Gómez, as dictated by Solo Smith MD.     EKG interpretation: (Preliminary) 1642  Rhythm: normal sinus rhythm; and regular . Rate (approx.): 87; Axis: normal; OK interval: normal (162); QRS interval: normal (98); QT/QTc: 380/457 ms; ST/T wave: non-specific changes, no ELAINA/STD; Other findings: similar to EKG from 11/24/15 with inferior Q waves. Written by MARQUISE Gómez, as dictated by Solo Smith MD.     Progress Note:  5:56 PM  Pt updated on her lab results. She has finished the oral contrast. CT pending. Written by MARQUISE Gómez, as dictated by Solo Smith MD.     Progress Note:  8:31 PM  Pt re-evaluated. She reports she feels better. Pt has been updated on her lab and CT results. She states she is ready to go home.   Written by Ann Marie James ED Jerad, as dictated by Eladia Dinero MD.     LABORATORY TESTS:  Recent Results (from the past 12 hour(s))   EKG, 12 LEAD, INITIAL    Collection Time: 07/18/17  4:42 PM   Result Value Ref Range    Ventricular Rate 87 BPM    Atrial Rate 87 BPM    P-R Interval 162 ms    QRS Duration 98 ms    Q-T Interval 380 ms    QTC Calculation (Bezet) 457 ms    Calculated P Axis 50 degrees    Calculated R Axis -23 degrees    Calculated T Axis 77 degrees    Diagnosis       Normal sinus rhythm  Moderate voltage criteria for LVH, may be normal variant  Inferior infarct (cited on or before 28-SEP-2011)  Abnormal ECG  When compared with ECG of 24-NOV-2015 11:08,  Nonspecific T wave abnormality no longer evident in Inferior leads     CBC WITH AUTOMATED DIFF    Collection Time: 07/18/17  5:10 PM   Result Value Ref Range    WBC 5.5 3.6 - 11.0 K/uL    RBC 4.62 3.80 - 5.20 M/uL    HGB 15.4 11.5 - 16.0 g/dL    HCT 42.4 35.0 - 47.0 %    MCV 91.8 80.0 - 99.0 FL    MCH 33.3 26.0 - 34.0 PG    MCHC 36.3 30.0 - 36.5 g/dL    RDW 13.1 11.5 - 14.5 %    PLATELET 592 262 - 302 K/uL    NEUTROPHILS 45 32 - 75 %    LYMPHOCYTES 38 12 - 49 %    MONOCYTES 13 5 - 13 %    EOSINOPHILS 3 0 - 7 %    BASOPHILS 1 0 - 1 %    ABS. NEUTROPHILS 2.5 1.8 - 8.0 K/UL    ABS. LYMPHOCYTES 2.1 0.8 - 3.5 K/UL    ABS. MONOCYTES 0.7 0.0 - 1.0 K/UL    ABS. EOSINOPHILS 0.2 0.0 - 0.4 K/UL    ABS.  BASOPHILS 0.0 0.0 - 0.1 K/UL   PROTHROMBIN TIME + INR    Collection Time: 07/18/17  5:10 PM   Result Value Ref Range    INR 1.1 0.9 - 1.1      Prothrombin time 11.5 (H) 9.0 - 65.8 sec   METABOLIC PANEL, COMPREHENSIVE    Collection Time: 07/18/17  5:10 PM   Result Value Ref Range    Sodium 138 136 - 145 mmol/L    Potassium 4.0 3.5 - 5.1 mmol/L    Chloride 107 97 - 108 mmol/L    CO2 28 21 - 32 mmol/L    Anion gap 3 (L) 5 - 15 mmol/L    Glucose 90 65 - 100 mg/dL    BUN 13 6 - 20 MG/DL    Creatinine 1.02 0.55 - 1.02 MG/DL    BUN/Creatinine ratio 13 12 - 20      GFR est AA >60 >60 ml/min/1.73m2    GFR est non-AA 54 (L) >60 ml/min/1.73m2    Calcium 9.5 8.5 - 10.1 MG/DL    Bilirubin, total 0.7 0.2 - 1.0 MG/DL    ALT (SGPT) 59 12 - 78 U/L    AST (SGOT) 56 (H) 15 - 37 U/L    Alk. phosphatase 102 45 - 117 U/L    Protein, total 8.7 (H) 6.4 - 8.2 g/dL    Albumin 3.4 (L) 3.5 - 5.0 g/dL    Globulin 5.3 (H) 2.0 - 4.0 g/dL    A-G Ratio 0.6 (L) 1.1 - 2.2     LIPASE    Collection Time: 07/18/17  5:10 PM   Result Value Ref Range    Lipase 112 73 - 393 U/L   LACTIC ACID    Collection Time: 07/18/17  5:10 PM   Result Value Ref Range    Lactic acid 1.0 0.4 - 2.0 MMOL/L   TROPONIN I    Collection Time: 07/18/17  5:10 PM   Result Value Ref Range    Troponin-I, Qt. <0.04 <0.05 ng/mL       IMAGING RESULTS:  EXAM:  CT ABD PELV WO CONT     INDICATION: upper abd pain, hx lap band and cholecystectomy     COMPARISON: 3/23/2016     CONTRAST:  None.     TECHNIQUE:   Thin axial images were obtained through the abdomen and pelvis. Coronal and  sagittal reconstructions were generated. Oral contrast was administered. CT dose  reduction was achieved through use of a standardized protocol tailored for this  examination and automatic exposure control for dose modulation.      The absence of intravenous contrast material reduces the sensitivity for  evaluation of the solid parenchymal organs of the abdomen.      FINDINGS:   LUNG BASES: Clear. INCIDENTALLY IMAGED HEART AND MEDIASTINUM: Unremarkable. LIVER: No mass or biliary dilatation. GALLBLADDER: Surgically absent. SPLEEN: No mass. PANCREAS: No mass or ductal dilatation. ADRENALS: Unremarkable. KIDNEYS/URETERS: No mass, calculus, or hydronephrosis. STOMACH: Gastric lap band in place, in appropriate position, unchanged since the  prior study. SMALL BOWEL: No dilatation or wall thickening. COLON: No dilatation or wall thickening. APPENDIX: Surgically absent. PERITONEUM: No ascites or pneumoperitoneum. RETROPERITONEUM: No lymphadenopathy or aortic aneurysm. Atherosclerotic aorta. REPRODUCTIVE ORGANS: Uterus surgically absent. URINARY BLADDER: No mass or calculus. BONES: Lower lumbar spine facet arthropathy. ADDITIONAL COMMENTS: Rim calcified granulomata in the soft tissues of the  anterior abdominal wall, particularly in the right lower quadrant.     IMPRESSION  IMPRESSION:  No acute findings in the abdomen or pelvis.            MEDICATIONS GIVEN:  Medications   0.9% sodium chloride infusion 1,000 mL (0 mL IntraVENous IV Completed 7/18/17 2044)   diatrizoate meglumine-d.sodium (MD-GASTROVIEW,GASTROGRAFIN) 66-10 % contrast solution 30 mL (30 mL Oral Given 7/18/17 1717)   ondansetron (ZOFRAN) injection 4 mg (4 mg IntraVENous Given 7/18/17 1757)   famotidine (PEPCID) tablet 20 mg (20 mg Oral Given 7/18/17 1952)   pantoprazole (PROTONIX) tablet 40 mg (40 mg Oral Given 7/18/17 1952)   aluminum-magnesium hydroxide (MAALOX) oral suspension 30 mL (30 mL Oral Given 7/18/17 1952)       IMPRESSION:  1. Abdominal pain, epigastric    pt advised of calcified rim granuloma in R lower abd wall    PLAN:  1. Current Discharge Medication List      START taking these medications    Details   raNITIdine (ZANTAC) 150 mg tablet Take 1 Tab by mouth nightly for 14 days. Qty: 14 Tab, Refills: 0      omeprazole (PRILOSEC) 20 mg capsule Take 1 Cap by mouth daily for 14 days. Indications: gastroesophageal reflux disease  Qty: 14 Cap, Refills: 0      acetaminophen (TYLENOL EXTRA STRENGTH) 500 mg tablet Take 2 Tabs by mouth every six (6) hours as needed for Pain. Indications: Pain  Qty: 40 Tab, Refills: 0         CONTINUE these medications which have NOT CHANGED    Details   LISINOPRIL PO Take  by mouth.      gabapentin (NEURONTIN) 300 mg capsule Take 1 Cap by mouth nightly. Qty: 30 Cap, Refills: 5    Associated Diagnoses: Cerebrovascular accident (CVA), unspecified mechanism ([address] Post-traumatic seizures ([address]  Acute midline low back pain without sciatica      mometasone (ELOCON) 0.1 % topical cream Apply  to affected area daily. Qty: 45 g, Refills: 5      rosuvastatin (CRESTOR) 10 mg tablet take 1 tablet by mouth once daily  Qty: 30 Tab, Refills: 11      amLODIPine (NORVASC) 5 mg tablet take 1 tablet by mouth once daily  Qty: 30 Tab, Refills: 5      spironolactone (ALDACTONE) 25 mg tablet take 1 tablet by mouth once daily  Qty: 30 Tab, Refills: 5      ofloxacin (FLOXIN) 0.3 % ophthalmic solution instill 1 drop into affected eye four times a day START 2 DAYS BEFORE SURGERY  Refills: 0      losartan (COZAAR) 100 mg tablet Take 1 Tab by mouth daily. Qty: 30 Tab, Refills: 11    Associated Diagnoses: Essential hypertension, hypertension with unspecified goal      triamcinolone acetonide (KENALOG) 0.1 % ointment Apply  to affected area two (2) times a day. use thin layer  Qty: 30 g, Refills: 1      mineral oil-hydrophil petrolat (AQUAPHOR) ointment Apply  to affected area as needed for Dry Skin. Qty: 14 oz, Refills: 0      timolol (BETIMOL) 0.25 % ophthalmic solution Administer 1-2 drops to both eyes two (2) times a day. use in affected eye(s)  Qty: 5 mL, Refills: 3      cyclobenzaprine (FLEXERIL) 5 mg tablet take 1 tablet by mouth three times a day if needed for muscle spasm  Qty: 30 Tab, Refills: 3      colchicine (COLCRYS) 0.6 mg tablet Take 1 tablet by mouth two (2) times a day.   Qty: 60 tablet, Refills: 5         STOP taking these medications       naproxen (NAPROSYN) 500 mg tablet Comments:   Reason for Stoppin.   Follow-up Information     Follow up With Details Comments Contact Info    Cara Oconnor MD Schedule an appointment as soon as possible for a visit in 2 days  [address] [place] [address]  [phone]      \[facility]\"" EMERGENCY DEPT Go in 1 day If symptoms worsen [address] [place] [address] Dr Jameel Avalos MD Schedule an appointment as soon as possible for a visit in 2 days FOR FOLLOW UP WITH [address]  Jonathan Ellison  [phone]          Return to ED if worse     DISCHARGE NOTE  8:34 PM  The patient has been re-evaluated and is ready for discharge. Reviewed available results with patient. Counseled pt on diagnosis and care plan. Pt has expressed understanding, and all questions have been answered. Pt agrees with plan and agrees to F/U as recommended, or return to the ED if their sxs worsen. Discharge instructions have been provided and explained to the pt, along with reasons to return to the ED. Written by Gera Alvares, ED Scribe, as dictated by Jaz Moreno MD.    This note is prepared by Gera Alvares, acting as Scribe for Jaz Moreno MD.    Jaz Moreno MD: The scribe's documentation has been prepared under my direction and personally reviewed by me in its entirety. I confirm that the note above accurately reflects all work, treatment, procedures, and medical decision making performed by me.

## 2017-07-19 LAB
ATRIAL RATE: 87 BPM
CALCULATED P AXIS, ECG09: 50 DEGREES
CALCULATED R AXIS, ECG10: -23 DEGREES
CALCULATED T AXIS, ECG11: 77 DEGREES
DIAGNOSIS, 93000: NORMAL
P-R INTERVAL, ECG05: 162 MS
Q-T INTERVAL, ECG07: 380 MS
QRS DURATION, ECG06: 98 MS
QTC CALCULATION (BEZET), ECG08: 457 MS
VENTRICULAR RATE, ECG03: 87 BPM

## 2017-07-19 NOTE — DISCHARGE INSTRUCTIONS
YOUR CT SCAN DID NOT SHOW ANY ACUTE ABNORMALITIES. YOU DO HAVE A CALCIFIED RIM GRANULOMA OF THE RIGHT LOWER ABDOMINAL WALL. Abdominal Pain: Care Instructions  Your Care Instructions    Abdominal pain has many possible causes. Some aren't serious and get better on their own in a few days. Others need more testing and treatment. If your pain continues or gets worse, you need to be rechecked and may need more tests to find out what is wrong. You may need surgery to correct the problem. Don't ignore new symptoms, such as fever, nausea and vomiting, urination problems, pain that gets worse, and dizziness. These may be signs of a more serious problem. Your doctor may have recommended a follow-up visit in the next 8 to 12 hours. If you are not getting better, you may need more tests or treatment. The doctor has checked you carefully, but problems can develop later. If you notice any problems or new symptoms, get medical treatment right away. Follow-up care is a key part of your treatment and safety. Be sure to make and go to all appointments, and call your doctor if you are having problems. It's also a good idea to know your test results and keep a list of the medicines you take. How can you care for yourself at home? · Rest until you feel better. · To prevent dehydration, drink plenty of fluids, enough so that your urine is light yellow or clear like water. Choose water and other caffeine-free clear liquids until you feel better. If you have kidney, heart, or liver disease and have to limit fluids, talk with your doctor before you increase the amount of fluids you drink. · If your stomach is upset, eat mild foods, such as rice, dry toast or crackers, bananas, and applesauce. Try eating several small meals instead of two or three large ones. · Wait until 48 hours after all symptoms have gone away before you have spicy foods, alcohol, and drinks that contain caffeine.   · Do not eat foods that are high in fat.  · Avoid anti-inflammatory medicines such as aspirin, ibuprofen (Advil, Motrin), and naproxen (Aleve). These can cause stomach upset. Talk to your doctor if you take daily aspirin for another health problem. When should you call for help? Call 911 anytime you think you may need emergency care. For example, call if:  · You passed out (lost consciousness). · You pass maroon or very bloody stools. · You vomit blood or what looks like coffee grounds. · You have new, severe belly pain. Call your doctor now or seek immediate medical care if:  · Your pain gets worse, especially if it becomes focused in one area of your belly. · You have a new or higher fever. · Your stools are black and look like tar, or they have streaks of blood. · You have unexpected vaginal bleeding. · You have symptoms of a urinary tract infection. These may include:  ¨ Pain when you urinate. ¨ Urinating more often than usual.  ¨ Blood in your urine. · You are dizzy or lightheaded, or you feel like you may faint. Watch closely for changes in your health, and be sure to contact your doctor if:  · You are not getting better after 1 day (24 hours). Where can you learn more? Go to http://lanre-jarocho.info/. Enter P332 in the search box to learn more about \"Abdominal Pain: Care Instructions. \"  Current as of: March 20, 2017  Content Version: 11.3  © 6254-0722 [employer]. Care instructions adapted under license by [employer] (which disclaims liability or warranty for this information). If you have questions about a medical condition or this instruction, always ask your healthcare professional. [place] [address] any warranty or liability for your use of this information. Indigestion (Dyspepsia or Heartburn): Care Instructions  Your Care Instructions  Sometimes it can be hard to pinpoint the cause of indigestion (dyspepsia or heartburn).  Most cases of an upset stomach with bloating, burning, burping, and nausea are minor and go away within several hours. Home treatment and over-the-counter medicine often are able to control symptoms. But if you take medicine to relieve your indigestion without making diet and lifestyle changes, your symptoms are likely to return again and again. If you get indigestion often, it may be a sign of a more serious medical problem. Be sure to follow up with your doctor, who may want to do tests to be sure of the cause of your indigestion. Follow-up care is a key part of your treatment and safety. Be sure to make and go to all appointments, and call your doctor if you are having problems. It's also a good idea to know your test results and keep a list of the medicines you take. How can you care for yourself at home? · Your doctor may recommend over-the-counter medicine. For mild or occasional indigestion, antacids such as Tums, Gaviscon, Mylanta, or Maalox may help. Be careful when you take over-the-counter antacid medicines. Many of these medicines have aspirin in them. Read the label to make sure that you are not taking more than the recommended dose. Too much aspirin can be harmful. · Your doctor also may recommend over-the-counter acid reducers, such as Pepcid AC, Tagamet HB, Zantac 75, or Prilosec. Read and follow all instructions on the label. If you use these medicines often, talk with your doctor. · Change your eating habits. ¨ It's best to eat several small meals instead of two or three large meals. ¨ After you eat, wait 2 to 3 hours before you lie down. ¨ Chocolate, mint, and alcohol can make GERD worse. ¨ Spicy foods, foods that have a lot of acid (like tomatoes and oranges), and coffee can make GERD symptoms worse in some people. If your symptoms are worse after you eat a certain food, you may want to stop eating that food to see if your symptoms get better. · Do not smoke or chew tobacco. Smoking can make GERD worse.  If you need help quitting, talk to your doctor about stop-smoking programs and medicines. These can increase your chances of quitting for good. · If you have GERD symptoms at night, raise the head of your bed 6 to 8 inches by putting the frame on blocks or placing a foam wedge under the head of your mattress. (Adding extra pillows does not work.)  · Do not wear tight clothing around your middle. · Lose weight if you need to. Losing just 5 to 10 pounds can help. · Do not take anti-inflammatory medicines, such as aspirin, ibuprofen (Advil, Motrin), or naproxen (Aleve). These can irritate the stomach. If you need a pain medicine, try acetaminophen (Tylenol), which does not cause stomach upset. When should you call for help? Call 911 anytime you think you may need emergency care. For example, call if:  · You passed out (lost consciousness). · You vomit blood or what looks like coffee grounds. · You pass maroon or very bloody stools. · You have chest pain or pressure. This may occur with:  ¨ Sweating. ¨ Shortness of breath. ¨ Nausea or vomiting. ¨ Pain that spreads from the chest to the neck, jaw, or one or both shoulders or arms. ¨ Feeling dizzy or lightheaded. ¨ A fast or uneven pulse. After calling 911, chew 1 adult-strength aspirin. Wait for an ambulance. Do not try to drive yourself. Call your doctor now or seek immediate medical care if:  · You have severe belly pain. · Your stools are black and tarlike or have streaks of blood. · You have trouble swallowing. · You are losing weight and do not know why. Watch closely for changes in your health, and be sure to contact your doctor if:  · You do not get better as expected. Where can you learn more? Go to http://lanre-jarocho.info/. Enter I360 in the search box to learn more about \"Indigestion (Dyspepsia or Heartburn): Care Instructions. \"  Current as of: November 16, 2016  Content Version: 11.3  © 7566-7708 [employer], [facility].  Care instructions adapted under license by [employer] (which disclaims liability or warranty for this information). If you have questions about a medical condition or this instruction, always ask your healthcare professional. [address] any warranty or liability for your use of this information.

## 2017-07-19 NOTE — ED NOTES
.Discharge instructions reviewed with patient and given to pt per Md Steff Connell. Pt able to return/verbalize discharge instructions. Copy of discharge instructions given. RX given to pt. Pt condition stable, no further complaints. Pt out of ER, accompanied by self & family. Ambulatory, steady gait. Wheelchair offered & pt declined. Patient out of ED prior to obtaining discharge vitals. Belongings & discharge paperwork out of ED with patient & family.

## 2017-07-19 NOTE — ED NOTES
Monitor x 2. Call bell in reach. Bed in lowest position, with side rails up x 2. Pt updated on plan of care. MD at bedside.

## 2017-08-07 DIAGNOSIS — I10 ESSENTIAL HYPERTENSION: ICD-10-CM

## 2017-08-07 RX ORDER — AMLODIPINE BESYLATE 5 MG/1
TABLET ORAL
Qty: 30 TAB | Refills: 5 | Status: SHIPPED | OUTPATIENT
Start: 2017-08-07 | End: 2018-03-25 | Stop reason: SDUPTHER

## 2017-08-07 RX ORDER — SPIRONOLACTONE 25 MG/1
TABLET ORAL
Qty: 30 TAB | Refills: 5 | Status: SHIPPED | OUTPATIENT
Start: 2017-08-07 | End: 2018-05-05 | Stop reason: SDUPTHER

## 2017-08-07 RX ORDER — LOSARTAN POTASSIUM 100 MG/1
TABLET ORAL
Qty: 30 TAB | Refills: 11 | Status: SHIPPED | OUTPATIENT
Start: 2017-08-07 | End: 2018-07-29 | Stop reason: SDUPTHER

## 2017-08-17 ENCOUNTER — TELEPHONE (OUTPATIENT)
Dept: INTERNAL MEDICINE CLINIC | Age: 67
End: 2017-08-17

## 2017-08-17 NOTE — TELEPHONE ENCOUNTER
Pt states that she would like an appt with dr as soon as possible concerning a letter that she needs(no details given.) Pt states that she did not want to see another provider.

## 2017-09-25 ENCOUNTER — TELEPHONE (OUTPATIENT)
Dept: INTERNAL MEDICINE CLINIC | Age: 67
End: 2017-09-25

## 2017-09-25 NOTE — TELEPHONE ENCOUNTER
Patient returned a phone call received from the practice this morning. Best contact number for patient is [phone].        Message received & copied from [facility]

## 2017-09-28 NOTE — TELEPHONE ENCOUNTER
Identified patient 2 identifiers verified. Patient needs a new letter stating that due to medical reasons needs to due full time tele work from work from home. The letter from 8/17 needs to be updated.

## 2017-10-02 ENCOUNTER — TELEPHONE (OUTPATIENT)
Dept: INTERNAL MEDICINE CLINIC | Age: 67
End: 2017-10-02

## 2017-10-04 ENCOUNTER — TELEPHONE (OUTPATIENT)
Dept: INTERNAL MEDICINE CLINIC | Age: 67
End: 2017-10-04

## 2017-10-04 NOTE — TELEPHONE ENCOUNTER
----- Message from Sean Nigel sent at 10/4/2017  9:50 AM EDT -----  Regarding: Dr. Yahaira Conley  Pt stated she will come by the office on Friday morning to  letter for her job for health  reason. Best contact number [phone].       Message copied/pasted from [facility]

## 2017-10-19 ENCOUNTER — TELEPHONE (OUTPATIENT)
Dept: NEUROLOGY | Age: 67
End: 2017-10-19

## 2017-10-27 ENCOUNTER — TELEPHONE (OUTPATIENT)
Dept: NEUROLOGY | Age: 67
End: 2017-10-27
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
## 2018-01-24 RX ORDER — ROSUVASTATIN CALCIUM 10 MG/1
TABLET, COATED ORAL
Qty: 30 TAB | Refills: 11 | Status: SHIPPED | OUTPATIENT
Start: 2018-01-24 | End: 2019-06-10 | Stop reason: SDUPTHER

## 2018-03-26 RX ORDER — AMLODIPINE BESYLATE 5 MG/1
TABLET ORAL
Qty: 30 TAB | Refills: 5 | Status: SHIPPED | OUTPATIENT
Start: 2018-03-26 | End: 2019-04-17 | Stop reason: SDUPTHER

## 2018-04-13 RX ORDER — MOMETASONE FUROATE 1 MG/G
CREAM TOPICAL
Qty: 45 G | Refills: 5 | Status: SHIPPED | OUTPATIENT
Start: 2018-04-13 | End: 2019-04-19

## 2018-05-06 RX ORDER — SPIRONOLACTONE 25 MG/1
TABLET ORAL
Qty: 30 TAB | Refills: 5 | Status: SHIPPED | OUTPATIENT
Start: 2018-05-06 | End: 2019-04-17 | Stop reason: SDUPTHER

## 2018-05-15 ENCOUNTER — OFFICE VISIT (OUTPATIENT)
Dept: CARDIOLOGY CLINIC | Age: 68
End: 2018-05-15

## 2018-05-15 ENCOUNTER — TELEPHONE (OUTPATIENT)
Dept: CARDIOLOGY CLINIC | Age: 68
End: 2018-05-15

## 2018-05-15 VITALS
WEIGHT: 226 LBS | HEIGHT: 62 IN | SYSTOLIC BLOOD PRESSURE: 114 MMHG | BODY MASS INDEX: 41.59 KG/M2 | DIASTOLIC BLOOD PRESSURE: 89 MMHG | RESPIRATION RATE: 12 BRPM | OXYGEN SATURATION: 98 % | HEART RATE: 112 BPM

## 2018-05-15 DIAGNOSIS — I10 ESSENTIAL HYPERTENSION: ICD-10-CM

## 2018-05-15 DIAGNOSIS — R60.0 BILATERAL LEG EDEMA: Primary | ICD-10-CM

## 2018-05-15 DIAGNOSIS — R55 SYNCOPE AND COLLAPSE: ICD-10-CM

## 2018-05-15 DIAGNOSIS — Z91.14 NONCOMPLIANCE WITH MEDICATION REGIMEN: ICD-10-CM

## 2018-05-15 DIAGNOSIS — I72.0 CAROTID ANEURYSM, RIGHT (HCC): ICD-10-CM

## 2018-05-15 DIAGNOSIS — E66.01 OBESITY, MORBID (HCC): ICD-10-CM

## 2018-05-15 DIAGNOSIS — I87.2 CHRONIC VENOUS INSUFFICIENCY: ICD-10-CM

## 2018-05-15 DIAGNOSIS — E78.2 MIXED HYPERLIPIDEMIA: ICD-10-CM

## 2018-05-15 DIAGNOSIS — I63.9 CEREBROVASCULAR ACCIDENT (CVA), UNSPECIFIED MECHANISM (HCC): ICD-10-CM

## 2018-05-15 RX ORDER — COLCHICINE 0.6 MG/1
0.6 TABLET ORAL 2 TIMES DAILY
Qty: 60 TAB | Refills: 5 | OUTPATIENT
Start: 2018-05-15

## 2018-05-15 NOTE — PROGRESS NOTES
[facility]   [address] [facility], [address]                                          NEW PATIENT HPI/FOLLOW-UP      NAME:  Zainab Mo   :   1950   MRN:   [id number]   PCP:  Guillermo Molina MD           Subjective: The patient is a 70y.o. year old female with h/o HTN, hyperlipidemia, BUSBY, CAD, CVI, gout, lap-band surgery, h/o CVA, seizure and MI who returns for a routine follow-up. Since the last visit, in , patient reports no new symptoms. \"just wanted to get checked out\". Pt states she came with her aunt who is also a patient of Dr. Didi Garcia because she hasn't been seen in a while. C/o AVILES with walking long distances. Denies change in exercise tolerance, chest pain, edema, medication intolerance, palpitations, PND/orthopnea, wheezing, sputum, syncope, dizziness or light headedness. Doing satisfactorily. Review of Systems  General: Pt denies excessive weight gain or loss. Pt is able to conduct ADL's. Respiratory: +shortness of breath, AVILES, Denies wheezing or stridor.   Cardiovascular: Denies precordial pain, palpitations, edema or PND  Gastrointestinal: Denies poor appetite, indigestion, abdominal pain or blood in stool  Peripheral vascular: Denies claudication, leg cramps  Neuropsychiatric: Denies paresthesias,tingling,numbness,anxiety,depression,fatigue  Musculoskeletal: Denies pain,tenderness, soreness,swelling      Past Medical History:   Diagnosis Date    CAD (coronary artery disease)     MI '98    Cancer ([address]     cervical; no chemo    Cerebral artery occlusion with cerebral infarction ([address]     Epilepsy ([address]     Essential hypertension     Gastrointestinal disorder     Glaucoma     HHD (hypertensive heart disease)     Hypercholesterolemia     Obesity     Seizure ([address]     Stroke ([address]     Thromboembolus ([address]     R leg; same leg as cardiac cath     Patient Active Problem List    Diagnosis Date Noted    Obesity, morbid ([facility] 05/15/2018    Stroke ([facility] 11/24/2015    Syncope and collapse 05/24/2013    Bilateral leg edema 04/19/2013    Gout 11/09/2011    Plantar fasciitis 09/15/2010    Noncompliance with medication regimen 05/28/2010    HTN (hypertension) 01/08/2010    HLD (hyperlipidemia) 01/08/2010    BUSBY (nonalcoholic steatohepatitis) 01/08/2010    CAD (coronary artery disease) 01/08/2010    Chronic venous insufficiency 01/08/2010    LAP-BAND surgery status 01/08/2010      Past Surgical History:   Procedure Laterality Date    HC LAP BAND ADJUST PROCEDURE  2008    HX APPENDECTOMY      HX CHOLECYSTECTOMY      HX HYSTERECTOMY      HX ORTHOPAEDIC      foot surgery    HX TONSILLECTOMY       Allergies   Allergen Reactions    Contrast Agent [Iodine] Hives    Lipitor [Atorvastatin] Myalgia    Pravastatin Rash    Tramadol Other (comments)     Caused seizure      Family History   Problem Relation Age of Onset    Hypertension Mother       Social History     Social History    Marital status:      Spouse name: N/A    Number of children: N/A    Years of education: N/A     Occupational History    Not on file. Social History Main Topics    Smoking status: Never Smoker    Smokeless tobacco: Never Used    Alcohol use No    Drug use: No    Sexual activity: Not on file     Other Topics Concern    Not on file     Social History Narrative      Current Outpatient Prescriptions   Medication Sig    spironolactone (ALDACTONE) 25 mg tablet take 1 tablet by mouth once daily    mometasone (ELOCON) 0.1 % topical cream apply to affected area daily    amLODIPine (NORVASC) 5 mg tablet take 1 tablet by mouth once daily    rosuvastatin (CRESTOR) 10 mg tablet take 1 tablet once daily    losartan (COZAAR) 100 mg tablet take 1 tablet by mouth once daily    acetaminophen (TYLENOL EXTRA STRENGTH) 500 mg tablet Take 2 Tabs by mouth every six (6) hours as needed for Pain.  Indications: Pain    LISINOPRIL PO Take by mouth daily.  cyclobenzaprine (FLEXERIL) 5 mg tablet take 1 tablet by mouth three times a day if needed for muscle spasm    ofloxacin (FLOXIN) 0.3 % ophthalmic solution instill 1 drop into affected eye four times a day START 2 DAYS BEFORE SURGERY    mineral oil-hydrophil petrolat (AQUAPHOR) ointment Apply  to affected area as needed for Dry Skin.  colchicine (COLCRYS) 0.6 mg tablet Take 1 tablet by mouth two (2) times a day. (Patient taking differently: Take 0.6 mg by mouth two (2) times daily as needed.)    gabapentin (NEURONTIN) 300 mg capsule Take 1 Cap by mouth nightly.  triamcinolone acetonide (KENALOG) 0.1 % ointment Apply  to affected area two (2) times a day. use thin layer    timolol (BETIMOL) 0.25 % ophthalmic solution Administer 1-2 drops to both eyes two (2) times a day. use in affected eye(s)     No current facility-administered medications for this visit. I have reviewed the nurses notes, vitals, problem list, allergy list, medical history, family medical, social history and medications. Objective:     Physical Exam:     Vitals:    05/15/18 1401 05/15/18 1411   BP: 100/78 114/89   Pulse: (!) 112    Resp: 12    SpO2: 98%    Weight: 226 lb (102.5 kg)    Height: 5' 2\" (1.575 m)     Body mass index is 41.34 kg/(m^2). General: WDWN adult female, in no acute distress. Pleasant affect. HEENT: No carotid bruits but right carotid is dilated and pulsatile compared to left side; trach is midline. Heart:  Normal S1/S2 negative S3 or S4. Tachycardic, + mid-systolic murmur, no gallop or rub.   Respiratory: Clear bilaterally, no wheezing or rales  Abdomen:   Soft, non-tender, bowel sounds are active.   Extremities:  No edema, normal cap refill, no cyanosis. Neuro: A&Ox3, speech clear, gait stable. Skin: Skin color is normal. No rashes or lesions. No diaphoresis.   Vascular: 2+ pulses symmetric in all extremities        Data Review:       Cardiographics:    EKG interpretation:  Rhythm: sinus tachycardia; and regular . Rate (approx.): 103; Axis: left axis deviation; P wave: normal; QRS interval: intra-ventricular conduction defect seen in II; ST/T wave: depression in aVL; significant Q-waves in  Lead: II, III and V5-6. This EKG was interpreted by Chivo Arreola PA-C     Cardiology Labs:    Results for orders placed or performed during the hospital encounter of 07/18/17   EKG, 12 LEAD, INITIAL   Result Value Ref Range    Ventricular Rate 87 BPM    Atrial Rate 87 BPM    P-R Interval 162 ms    QRS Duration 98 ms    Q-T Interval 380 ms    QTC Calculation (Bezet) 457 ms    Calculated P Axis 50 degrees    Calculated R Axis -23 degrees    Calculated T Axis 77 degrees    Diagnosis       Normal sinus rhythm  Moderate voltage criteria for LVH, may be normal variant  Inferior infarct (cited on or before 28-SEP-2011)  Confirmed by Antoni Topete ([id number]) on 7/19/2017 8:25:29 AM         Lab Results   Component Value Date/Time    Cholesterol, total 149 07/08/2016 11:57 AM    HDL Cholesterol 50 07/08/2016 11:57 AM    LDL, calculated 84 07/08/2016 11:57 AM    Triglyceride 74 07/08/2016 11:57 AM    CHOL/HDL Ratio 3.8 11/25/2015 04:04 AM       Lab Results   Component Value Date/Time    Sodium 138 07/18/2017 05:10 PM    Potassium 4.0 07/18/2017 05:10 PM    Chloride 107 07/18/2017 05:10 PM    CO2 28 07/18/2017 05:10 PM    Anion gap 3 (L) 07/18/2017 05:10 PM    Glucose 90 07/18/2017 05:10 PM    BUN 13 07/18/2017 05:10 PM    Creatinine 1.02 07/18/2017 05:10 PM    BUN/Creatinine ratio 13 07/18/2017 05:10 PM    GFR est AA >60 07/18/2017 05:10 PM    GFR est non-AA 54 (L) 07/18/2017 05:10 PM    Calcium 9.5 07/18/2017 05:10 PM    Bilirubin, total 0.7 07/18/2017 05:10 PM    AST (SGOT) 56 (H) 07/18/2017 05:10 PM    Alk.  phosphatase 102 07/18/2017 05:10 PM    Protein, total 8.7 (H) 07/18/2017 05:10 PM    Albumin 3.4 (L) 07/18/2017 05:10 PM    Globulin 5.3 (H) 07/18/2017 05:10 PM    A-G Ratio 0.6 (L) 07/18/2017 05:10 PM    ALT (SGPT) 59 07/18/2017 05:10 PM          Assessment:       ICD-10-CM ICD-9-CM    1. Bilateral leg edema R60.0 782.3 AMB POC EKG ROUTINE W/ 12 LEADS, INTER & REP   2. Mixed hyperlipidemia E78.2 272.2 AMB POC EKG ROUTINE W/ 12 LEADS, INTER & REP   3. Cerebrovascular accident (CVA), unspecified mechanism ([facility] I63.9 434.91 AMB POC EKG ROUTINE W/ 12 LEADS, INTER & REP      DUPLEX CAROTID BILATERAL   4. Essential hypertension I10 401.9 AMB POC EKG ROUTINE W/ 12 LEADS, INTER & REP   5. Noncompliance with medication regimen Z91.14 V15.81    6. Syncope and collapse R55 780.2    7. Chronic venous insufficiency I87.2 459.81    8. Obesity, morbid ([facility] E66.01 278.01    9. Carotid aneurysm, right (HCC) I72.0 442.81 DUPLEX CAROTID BILATERAL         Discussion: Patient presents at this time stable from a cardiac perspective. BP was controlled. Has what appears to be prominent pulsation base of right innominate artery suspicious of aneurysm. No previous mention 2015,2017. Will repeat carotid duplex. Discussed/seen with Dr. Analia Cedillo: 1. Continue same meds. 2. Lipid profile and labs followed by PCP. 3.Encouraged to exercise to tolerance, lose weight,  and follow low fat, low cholesterol, low sodium predominantly Plant-based (consider Mediterranean) diet. 4.Follow up: 6 months   --  Call with questions or concerns. I have discussed the diagnosis with the patient and the intended plan as seen in the above orders. The patient has received an after-visit summary and questions were answered concerning future plans. I have discussed any concerning medication side effects and warnings with the patient as well. Patient seen and examined. All pertinent data reviewed. I have reviewed detailed note as outlined by Roberto He. Case discussed with Nursing/medical assistant staff and Roberto He. Patient presents cardiac stable.  Had CVA and seizure 2015. Placed on Plavix since stopped. Also off ASA. Has pulsatile prominent mass base of innominate artery. Will obtain carotid duplex to exclude aneurysmal dilatation. Meanwhile restart bay ASA unless objection from Neuro. Plans as outlined. Kim Sapp PA-C  5/15/2018     KAROLYN JONES MD,FACC. [facility]

## 2018-05-15 NOTE — PROGRESS NOTES
Chief Complaint   Patient presents with    Hypertension     no other complaints. 1. Have you been to the ER, urgent care clinic since your last visit? Hospitalized since your last visit? Yes When: 7/2017;abdomen pain,[facility]    2. Have you seen or consulted any other health care providers outside of the [facility] since your last visit? Include any pap smears or colon screening.  No

## 2018-05-15 NOTE — MR AVS SNAPSHOT
[address] [facility] 
 
 
 [address] [facility] 
[phone] Patient: Britney Vargas MRN:  [id number] Visit Information Date & Time Provider Department Dept. Phone Encounter #  
 5/15/2018  1:45 PM MD [facility] [phone] [id number] Follow-up Instructions Return in about 6 months (around 11/15/2018). Routing History Follow-up and Disposition History Your Appointments 11/20/2018  2:30 PM  
ESTABLISHED PATIENT with MD Manuel [facility]) Appt Note: SIX MONTH FOLLOW UP-  [facility]  
 [address] [facility]  
[phone] [address] Upcoming Health Maintenance Date Due COLONOSCOPY 4/10/1967 GLAUCOMA SCREENING Q2Y 4/10/2014 Bone Densitometry (Dexa) Screening 4/10/2014 BREAST CANCER SCRN MAMMOGRAM 6/1/2014 Pneumococcal 65+ Low/Medium Risk (2 of 2 - PPSV23) 3/24/2018 Influenza Age 5 to Adult 8/1/2018 DTaP/Tdap/Td series (2 - Td) 3/24/2027 Allergies as of 5/15/2018  Review Complete On: 5/15/2018 By: Braxton Andersen MD  
  
 Severity Noted Reaction Type Reactions Contrast Agent [Iodine]  09/23/2011    Hives Lipitor [Atorvastatin]  10/03/2014    Myalgia Pravastatin  05/24/2013    Rash Tramadol  12/15/2015    Other (comments) Caused seizure Current Immunizations  Never Reviewed No immunizations on file. Not reviewed this visit You Were Diagnosed With   
  
 Codes Comments Bilateral leg edema    -  Primary ICD-10-CM: R60.0 ICD-9-CM: 782.3 Mixed hyperlipidemia     ICD-10-CM: E78.2 ICD-9-CM: 272.2 Cerebrovascular accident (CVA), unspecified mechanism ([facility]     ICD-10-CM: I63.9 ICD-9-CM: 434.91   
 Essential hypertension     ICD-10-CM: I10 
ICD-9-CM: 401.9 Noncompliance with medication regimen     ICD-10-CM: Z91.14 
ICD-9-CM: V15.81 Syncope and collapse     ICD-10-CM: R55 
ICD-9-CM: 780. 2 Chronic venous insufficiency     ICD-10-CM: I87.2 ICD-9-CM: 459.81 Obesity, morbid ([address]     ICD-10-CM: E66.01 
ICD-9-CM: 278.01 Carotid aneurysm, right (HCC)     ICD-10-CM: I72.0 ICD-9-CM: 442.81 Vitals BP Pulse Resp Height(growth percentile) Weight(growth percentile) SpO2  
 114/89 (BP 1 Location: Right arm, BP Patient Position: Sitting) (!) 112 12 5' 2\" (1.575 m) 226 lb (102.5 kg) 98% BMI OB Status Smoking Status 41.34 kg/m2 Hysterectomy Never Smoker Vitals History BMI and BSA Data Body Mass Index Body Surface Area  
 41.34 kg/m 2 2.12 m 2 Preferred Pharmacy Pharmacy Name Phone [id number] [address] Sandra Garner [phone] Your Updated Medication List  
  
   
This list is accurate as of 5/15/18 11:59 PM.  Always use your most recent med list.  
  
  
  
  
 acetaminophen 500 mg tablet Commonly known as:  [address] [facility] Take 2 Tabs by mouth every six (6) hours as needed for Pain. Indications: Pain  
  
 amLODIPine 5 mg tablet Commonly known as:  NORVASC  
take 1 tablet by mouth once daily  
  
 colchicine 0.6 mg tablet Commonly known as:  COLCRYS Take 1 tablet by mouth two (2) times a day. cyclobenzaprine 5 mg tablet Commonly known as:  FLEXERIL  
take 1 tablet by mouth three times a day if needed for muscle spasm  
  
 gabapentin 300 mg capsule Commonly known as:  NEURONTIN Take 1 Cap by mouth nightly. LISINOPRIL PO Take  by mouth daily. losartan 100 mg tablet Commonly known as:  COZAAR  
take 1 tablet by mouth once daily  
  
 mineral oil-hydrophil petrolat ointment Commonly known as:  AQUAPHOR Apply  to affected area as needed for Dry Skin. mometasone 0.1 % topical cream  
Commonly known as:  ELOCON  
apply to affected area daily  
  
 ofloxacin 0.3 % ophthalmic solution Commonly known as:  FLOXIN  
instill 1 drop into affected eye four times a day START 2 DAYS BEFORE SURGERY  
  
 rosuvastatin 10 mg tablet Commonly known as:  CRESTOR  
take 1 tablet once daily  
  
 spironolactone 25 mg tablet Commonly known as:  ALDACTONE  
take 1 tablet by mouth once daily  
  
 timolol 0.25 % ophthalmic solution Commonly known as:  Cristiana Pat Administer 1-2 drops to both eyes two (2) times a day. use in affected eye(s)  
  
 triamcinolone acetonide 0.1 % ointment Commonly known as:  KENALOG Apply  to affected area two (2) times a day. use thin layer We Performed the Following AMB POC EKG ROUTINE W/ 12 LEADS, INTER & REP [95452 CPT(R)] Follow-up Instructions Return in about 6 months (around 11/15/2018). To-Do List   
 05/15/2018 Imaging:  DUPLEX CAROTID BILATERAL Introducing [facility]! Esther Mcmillan introduces [employer] patient portal. Now you can access parts of your medical record, email your doctor's office, and request medication refills online. 1. In your internet browser, go to [email or web address] 2. Click on the First Time User? Click Here link in the Sign In box. You will see the New Member Sign Up page. 3. Enter your [employer] Access Code exactly as it appears below. You will not need to use this code after youve completed the sign-up process. If you do not sign up before the expiration date, you must request a new code. · [employer] Access Code: [facility] Expires: 8/13/2018  3:13 PM 
 
4. Enter the last four digits of your Social Security Number (xxxx) and Date of Birth (mm/dd/yyyy) as indicated and click Submit. You will be taken to the next sign-up page. 5. Create a [employer] ID.  This will be your [employer] login ID and cannot be changed, so think of one that is secure and easy to remember. 6. Create a Team[employer] password. You can change your password at any time. 7. Enter your Password Reset Question and Answer. This can be used at a later time if you forget your password. 8. Enter your e-mail address. You will receive e-mail notification when new information is available in [address] 9. Click Sign Up. You can now view and download portions of your medical record. 10. Click the Download Summary menu link to download a portable copy of your medical information. If you have questions, please visit the Frequently Asked Questions section of the [employer] website. Remember, [employer] is NOT to be used for urgent needs. For medical emergencies, dial 911. Now available from your iPhone and Android! Please provide this summary of care documentation to your next provider. Your primary care clinician is listed as [place]. If you have any questions after today's visit, please call [phone].

## 2018-05-16 NOTE — TELEPHONE ENCOUNTER
Spoke with pt . Verified 2 identifers. Told pt per Sushant Mina :  Colchicine is a short term medication. And her gout flare up has resolved. She should not have this for long term use, it will destroy her kidneys. Please ask her to follow up with her PCP to discuss appropriate long-term gout control. Patient verbalize understanding .

## 2018-05-21 ENCOUNTER — HOSPITAL ENCOUNTER (OUTPATIENT)
Dept: VASCULAR SURGERY | Age: 68
Discharge: HOME | End: 2018-05-21
Attending: PHYSICIAN ASSISTANT
Payer: MEDICARE

## 2018-05-21 DIAGNOSIS — I63.9 CEREBROVASCULAR ACCIDENT (CVA), UNSPECIFIED MECHANISM (HCC): ICD-10-CM

## 2018-05-21 DIAGNOSIS — I72.0 CAROTID ANEURYSM, RIGHT (HCC): ICD-10-CM

## 2018-05-21 PROCEDURE — 93880 EXTRACRANIAL BILAT STUDY: CPT

## 2018-05-21 NOTE — PROCEDURES
[facility]  *** FINAL REPORT ***    Name: Chayo Rodriguez  MRN: [id number]    Outpatient  : 10 Apr 1950  HIS Order #: [id number]  [id number] [facility] Visit #: [id number]  Date: 21 May 2018    TYPE OF TEST: Cerebrovascular Duplex    REASON FOR TEST  Cerebrovascular accident    Right Carotid:-             Proximal               Mid                 Distal  cm/s  Systolic  Diastolic  Systolic  Diastolic  Systolic  Diastolic  CCA:     69.0       0.0                            95.0       7.0  Bulb:  ECA:     55.0       6.0  ICA:     65.0      17.0       88.0      16.0       72.0      20.0  ICA/CCA:  0.7       2.4    ICA Stenosis: <50%    Right Vertebral:-  Finding: Antegrade  Sys:       60.0  Rosa:       18.0    Right Subclavian: Normal    Left Carotid:-            Proximal                Mid                 Distal  cm/s  Systolic  Diastolic  Systolic  Diastolic  Systolic  Diastolic  CCA:     34.3      13.0                            67.0      23.0  Bulb:  ECA:     40.0      10.0  ICA:     32.0      12.0       52.0      20.0       67.0      27.0  ICA/CCA:  0.5       0.5    ICA Stenosis: <50%    Left Vertebral:-  Finding: Not visualized  Sys:  Rosa:    Left Subclavian: Normal    INTERPRETATION/FINDINGS  PROCEDURE:  Evaluation of the extracranial cerebrovascular arteries  with ultrasound (B-mode imaging, pulsed Doppler, color Doppler). Includes the common carotid, internal carotid, external carotid, and  vertebral arteries. FINDINGS: <50% stenosis due to calcific plaque in bilateral internal  carotid arteries. Bilateral carotid arteries are very tortuous. IMPRESSION: Findings are consistent with <50% stenosis of the right  internal carotid and <50% stenosis of the left internal carotid. The  right vertebrals are patent with antegrade flow. The left vertebrals  were visualized but unable to doppler arterial flow.     ADDITIONAL COMMENTS    I have personally reviewed the data relevant to the interpretation of  this  study. TECHNOLOGIST: Tatiana Guerra RVT  Signed: 05/21/2018 11:15 AM    PHYSICIAN: Quiana Matta.  Fiona Paulino MD  Signed: 05/21/2018 11:49 AM

## 2018-05-24 ENCOUNTER — TELEPHONE (OUTPATIENT)
Dept: CARDIOLOGY CLINIC | Age: 68
End: 2018-05-24

## 2018-05-24 NOTE — TELEPHONE ENCOUNTER
----- Message from Kaylyn Rosas MD sent at 5/24/2018 11:39 AM EDT -----  Regarding: carotids  Baylee LINDSAY,    Would you have Dr MCCARTHY discuss with her     Thx    Thx Dr MCCARTHY!!!!!!!!!!!!!!!!!!!!!!!!!!!!!!!!!!!!    B  ----- Message -----     From: Kaylah Delong MD     Sent: 5/21/2018  11:49 AM       To: Kaylyn Rosas MD

## 2018-05-24 NOTE — TELEPHONE ENCOUNTER
Spoke with patient. Verified patient with two patient identifiers. Discussed abn carotid duplex results with her. Per Dr. Sabina Valdes, refer to Dr. Vicki Griffith for evaluation, ? Something to watch or treatment needed. Will ask PSR to call pt to schedule appt with Dr. Vivian Pace. Patient verbalized understanding.

## 2018-05-25 NOTE — TELEPHONE ENCOUNTER
Message  Received: Today       Sergo Schwab LPN       Cc: Shereen Zuniga LPN       Caller: Unspecified Solange Pulido,  1:36 PM)                     Apt scheduled 6/12/18 @ 2:00pm       Noted. Thanks.

## 2018-05-30 NOTE — PROGRESS NOTES
Spoke with pt . Verified 2 identifers. Told pt to f/u with dr Kevin Maxwell next available to arnold duplex carotid bilateral test.Patient verbalize understanding .

## 2018-06-05 ENCOUNTER — OFFICE VISIT (OUTPATIENT)
Dept: INTERNAL MEDICINE CLINIC | Age: 68
End: 2018-06-05

## 2018-06-05 ENCOUNTER — TELEPHONE (OUTPATIENT)
Dept: INTERNAL MEDICINE CLINIC | Age: 68
End: 2018-06-05

## 2018-06-05 VITALS
OXYGEN SATURATION: 97 % | TEMPERATURE: 98.3 F | HEART RATE: 89 BPM | WEIGHT: 223.6 LBS | SYSTOLIC BLOOD PRESSURE: 109 MMHG | RESPIRATION RATE: 20 BRPM | DIASTOLIC BLOOD PRESSURE: 76 MMHG | BODY MASS INDEX: 41.15 KG/M2 | HEIGHT: 62 IN

## 2018-06-05 DIAGNOSIS — L30.8 OTHER ECZEMA: ICD-10-CM

## 2018-06-05 DIAGNOSIS — M17.10 ARTHRITIS OF KNEE: ICD-10-CM

## 2018-06-05 DIAGNOSIS — L03.311 CELLULITIS OF ABDOMINAL WALL: Primary | ICD-10-CM

## 2018-06-05 RX ORDER — LATANOPROST 50 UG/ML
SOLUTION/ DROPS OPHTHALMIC
Refills: 0 | COMMUNITY
Start: 2018-05-29 | End: 2019-04-18 | Stop reason: SDUPTHER

## 2018-06-05 RX ORDER — TRIAMCINOLONE ACETONIDE 5 MG/G
CREAM TOPICAL 2 TIMES DAILY
Qty: 15 G | Refills: 0 | Status: SHIPPED | OUTPATIENT
Start: 2018-06-05 | End: 2019-04-19

## 2018-06-05 RX ORDER — CEPHALEXIN 500 MG/1
500 CAPSULE ORAL 4 TIMES DAILY
Qty: 40 CAP | Refills: 0 | Status: SHIPPED | OUTPATIENT
Start: 2018-06-05 | End: 2018-06-15

## 2018-06-05 NOTE — PROGRESS NOTES
Addendum:     1. R Knee pain--medial.  Crepitus on exam.  Likely arthritis. Plan: OTC anagesics. Follow as desired with Dr. Juan. 2. Eczema. Mometasone cream not adequately relieving symptoms. Try aristocort. Go back to derm.

## 2018-06-05 NOTE — PROGRESS NOTES
1. Have you been to the ER, urgent care clinic since your last visit? Hospitalized since your last visit?no    2. Have you seen or consulted any other health care providers outside of the [address] [place] [address] since your last visit? Include any pap smears or colon screening.  no

## 2018-06-05 NOTE — TELEPHONE ENCOUNTER
Patient states she needs to be seen for the bottom half of her stomach is Red & painful. Patient scheduled appt for today with Dr. Zeina Burkett at 2:45pm. Please call to advise if needs to go to the ER instead.  Thank you

## 2018-06-05 NOTE — PROGRESS NOTES
SUBJECTIVE  Ms. Pb Lee presents today acutely for     Chief Complaint   Patient presents with    Abdominal Pain     pt here today c/o blood red color to bottem of stomach and pain          OBJECTIVE  Visit Vitals    /76 (BP 1 Location: Left arm, BP Patient Position: Sitting)    Pulse 89    Temp 98.3 °F (36.8 °C) (Oral)    Resp 20    Ht 5' 2\" (1.575 m)    Wt 223 lb 9.6 oz (101.4 kg)    SpO2 97%    BMI 40.9 kg/m2     Gen: Pleasant 70 y.o.  female in NAD.   HEART: RRR, No M/G/R.   LUNGS: CTAB No W/R.   ABDOMEN: Lower abdomen / pannus -- warm, red, and exquisitely tender. ASSESSMENT / PLAN    ICD-10-CM ICD-9-CM    1. Cellulitis of abdominal wall L03.311 682.2 CBC WITH AUTOMATED DIFF      cephALEXin (KEFLEX) 500 mg capsule       I have reviewed with the patient details of the assessment and plan and all questions were answered. Relevant patient education was performed. Follow-up Disposition:  Return if symptoms worsen or fail to improve.

## 2018-06-05 NOTE — MR AVS SNAPSHOT
[address] [facility] 
[phone] Patient: Fabi Hogue MRN:  [id number] Visit Information Date & Time Provider Department Dept. Phone Encounter #  
 6/5/2018  2:45 PM Eduardo Mobley, [address] [place] [address] [id number] Follow-up Instructions Return if symptoms worsen or fail to improve. Follow-up and Disposition History Your Appointments 6/12/2018  2:00 PM  
New Patient with MD [facility] Appt Note: vascular - ref by Dr. Nolvia Lopez - abnormal carotid duplex results [id number] [facility]  
[phone] [address] [facility]  
  
    
 6/13/2018 11:05 AM  
ESTABLISHED PATIENT with MD Manuel [facility]) Appt Note: F/U  
 [address] [facility] [address]  
[phone] [address]  
  
    
 7/11/2018 11:15 AM  
ROUTINE CARE with Eduardo Mobley MD  
[facility] Appt Note: discuss headaches $30CP CC TDC 05/23/2018; r/s [facility] 05/23/2018  
 [facility] [address]  
  
   
 [facility] [address] [facility]  [address] [facility]  
  
    
 11/20/2018  2:30 PM  
ESTABLISHED PATIENT with MD Manuel [facility]) Appt Note: SIX MONTH [facility]LLOW [facility]-  [facility]  
 [address]  
[phone] Upcoming Health Maintenance Date Due COLONOSCOPY 4/10/1967 GLAUCOMA SCREENING Q2Y 4/10/2014 Bone Densitometry (Dexa) Screening 4/10/2014 BREAST CANCER SCRN MAMMOGRAM 6/1/2014 Pneumococcal 65+ Low/Medium Risk (2 of 2 - PPSV23) 3/24/2018 MEDICARE YEARLY EXAM 5/21/2018 Influenza Age 5 to Adult 8/1/2018 DTaP/Tdap/Td series (2 - Td) 3/24/2027 Allergies as of 6/5/2018  Review Complete On: 6/5/2018 By: Yen Sylvester MD  
  
 Severity Noted Reaction Type Reactions Contrast Agent [Iodine]  09/23/2011    Hives Lipitor [Atorvastatin]  10/03/2014    Myalgia Pravastatin  05/24/2013    Rash Tramadol  12/15/2015    Other (comments) Caused seizure Current Immunizations  Never Reviewed No immunizations on file. Not reviewed this visit You Were Diagnosed With   
  
 Codes Comments Cellulitis of abdominal wall    -  Primary ICD-10-CM: L03.311 ICD-9-CM: 076. 2 Vitals BP Pulse Temp Resp Height(growth percentile) Weight(growth percentile) 109/76 (BP 1 Location: Left arm, BP Patient Position: Sitting) 89 98.3 °F (36.8 °C) (Oral) 20 5' 2\" (1.575 m) 223 lb 9.6 oz (101.4 kg) SpO2 BMI OB Status Smoking Status 97% 40.9 kg/m2 Hysterectomy Never Smoker Vitals History BMI and BSA Data Body Mass Index Body Surface Area 40.9 kg/m 2 2.11 m 2 Preferred Pharmacy Pharmacy Name Phone [id number] [address] [phone] Your Updated Medication List  
  
   
This list is accurate as of 6/5/18  3:57 PM.  Always use your most recent med list.  
  
  
  
  
 acetaminophen 500 mg tablet Commonly known as:  [address] Take 2 Tabs by mouth every six (6) hours as needed for Pain. Indications: Pain  
  
 amLODIPine 5 mg tablet Commonly known as:  NORVASC  
take 1 tablet by mouth once daily  
  
 cephALEXin 500 mg capsule Commonly known as:  Jeremy Aubrey Take 1 Cap by mouth four (4) times daily for 10 days. colchicine 0.6 mg tablet Commonly known as:  COLCRYS Take 1 tablet by mouth two (2) times a day. cyclobenzaprine 5 mg tablet Commonly known as:  FLEXERIL  
take 1 tablet by mouth three times a day if needed for muscle spasm  
  
 gabapentin 300 mg capsule Commonly known as:  NEURONTIN Take 1 Cap by mouth nightly. latanoprost 0.005 % ophthalmic solution Commonly known as:  XALATAN  
instill 1 drop into both eyes at bedtime LISINOPRIL PO Take  by mouth daily. losartan 100 mg tablet Commonly known as:  COZAAR  
take 1 tablet by mouth once daily  
  
 mineral oil-hydrophil petrolat ointment Commonly known as:  AQUAPHOR Apply  to affected area as needed for Dry Skin.  
  
 mometasone 0.1 % topical cream  
Commonly known as:  ELOCON  
apply to affected area daily  
  
 ofloxacin 0.3 % ophthalmic solution Commonly known as:  FLOXIN  
instill 1 drop into affected eye four times a day START 2 DAYS BEFORE SURGERY  
  
 rosuvastatin 10 mg tablet Commonly known as:  CRESTOR  
take 1 tablet once daily  
  
 spironolactone 25 mg tablet Commonly known as:  ALDACTONE  
take 1 tablet by mouth once daily  
  
 timolol 0.25 % ophthalmic solution Commonly known as:  Kristbabar Justice Administer 1-2 drops to both eyes two (2) times a day. use in affected eye(s)  
  
 triamcinolone acetonide 0.1 % ointment Commonly known as:  KENALOG Apply  to affected area two (2) times a day. use thin layer Prescriptions Sent to Pharmacy Refills  
 cephALEXin (KEFLEX) 500 mg capsule 0 Sig: Take 1 Cap by mouth four (4) times daily for 10 days. Class: Normal  
 [id number] [address] [place] [address] Ph #: [phone] Route: Oral  
  
We Performed the Following CBC WITH AUTOMATED DIFF [89830 CPT(R)] Follow-up Instructions Return if symptoms worsen or fail to improve. Introducing [facility] & HE[facility]!    
 [employer] introduces [employer] patient portal. Now you can access parts of your medical record, email your doctor's office, and request medication refills online. 1. In your internet browser, go to [email or web address] 2. Click on the First Time User? Click Here link in the Sign In box. You will see the New Member Sign Up page. 3. Enter your [employer] Access Code exactly as it appears below. You will not need to use this code after youve completed the sign-up process. If you do not sign up before the expiration date, you must request a new code. · [employer] Access Code: [facility] Expires: 8/13/2018  3:13 PM 
 
4. Enter the last four digits of your Social Security Number (xxxx) and Date of Birth (mm/dd/yyyy) as indicated and click Submit. You will be taken to the next sign-up page. 5. Create a Jobspot[employer] ID. This will be your [employer] login ID and cannot be changed, so think of one that is secure and easy to remember. 6. Create a [employer] password. You can change your password at any time. 7. Enter your Password Reset Question and Answer. This can be used at a later time if you forget your password. 8. Enter your e-mail address. You will receive e-mail notification when new information is available in [address] 9. Click Sign Up. You can now view and download portions of your medical record. 10. Click the Download Summary menu link to download a portable copy of your medical information. If you have questions, please visit the Frequently Asked Questions section of the [employer] website. Remember, [employer] is NOT to be used for urgent needs. For medical emergencies, dial 911. Now available from your iPhone and Android! Please provide this summary of care documentation to your next provider. Your primary care clinician is listed as [place]. If you have any questions after today's visit, please call [phone].

## 2018-06-06 LAB
ALBUMIN SERPL-MCNC: 3.8 G/DL (ref 3.6–4.8)
ALBUMIN/GLOB SERPL: 0.9 {RATIO} (ref 1.2–2.2)
ALP SERPL-CCNC: 87 IU/L (ref 39–117)
ALT SERPL-CCNC: 38 IU/L (ref 0–32)
AST SERPL-CCNC: 39 IU/L (ref 0–40)
BASOPHILS # BLD AUTO: 0 X10E3/UL (ref 0–0.2)
BASOPHILS NFR BLD AUTO: 0 %
BILIRUB SERPL-MCNC: 0.7 MG/DL (ref 0–1.2)
BUN SERPL-MCNC: 22 MG/DL (ref 8–27)
BUN/CREAT SERPL: 20 (ref 12–28)
CALCIUM SERPL-MCNC: 9.7 MG/DL (ref 8.7–10.3)
CHLORIDE SERPL-SCNC: 106 MMOL/L (ref 96–106)
CHOLEST SERPL-MCNC: 156 MG/DL (ref 100–199)
CO2 SERPL-SCNC: 18 MMOL/L (ref 18–29)
CREAT SERPL-MCNC: 1.09 MG/DL (ref 0.57–1)
EOSINOPHIL # BLD AUTO: 0.1 X10E3/UL (ref 0–0.4)
EOSINOPHIL NFR BLD AUTO: 1 %
ERYTHROCYTE [DISTWIDTH] IN BLOOD BY AUTOMATED COUNT: 14.1 % (ref 12.3–15.4)
GFR SERPLBLD CREATININE-BSD FMLA CKD-EPI: 52 ML/MIN/1.73
GFR SERPLBLD CREATININE-BSD FMLA CKD-EPI: 60 ML/MIN/1.73
GLOBULIN SER CALC-MCNC: 4.3 G/DL (ref 1.5–4.5)
GLUCOSE SERPL-MCNC: 89 MG/DL (ref 65–99)
HCT VFR BLD AUTO: 39.4 % (ref 34–46.6)
HDLC SERPL-MCNC: 55 MG/DL
HGB BLD-MCNC: 13.5 G/DL (ref 11.1–15.9)
IMM GRANULOCYTES # BLD: 0 X10E3/UL (ref 0–0.1)
IMM GRANULOCYTES NFR BLD: 0 %
LDLC SERPL CALC-MCNC: 81 MG/DL (ref 0–99)
LYMPHOCYTES # BLD AUTO: 1.9 X10E3/UL (ref 0.7–3.1)
LYMPHOCYTES NFR BLD AUTO: 29 %
MCH RBC QN AUTO: 31.1 PG (ref 26.6–33)
MCHC RBC AUTO-ENTMCNC: 34.3 G/DL (ref 31.5–35.7)
MCV RBC AUTO: 91 FL (ref 79–97)
MONOCYTES # BLD AUTO: 0.5 X10E3/UL (ref 0.1–0.9)
MONOCYTES NFR BLD AUTO: 7 %
NEUTROPHILS # BLD AUTO: 4.2 X10E3/UL (ref 1.4–7)
NEUTROPHILS NFR BLD AUTO: 63 %
PLATELET # BLD AUTO: 239 X10E3/UL (ref 150–379)
POTASSIUM SERPL-SCNC: 4.4 MMOL/L (ref 3.5–5.2)
PROT SERPL-MCNC: 8.1 G/DL (ref 6–8.5)
RBC # BLD AUTO: 4.34 X10E6/UL (ref 3.77–5.28)
SODIUM SERPL-SCNC: 139 MMOL/L (ref 134–144)
TRIGL SERPL-MCNC: 99 MG/DL (ref 0–149)
URATE SERPL-MCNC: 6.5 MG/DL (ref 2.5–7.1)
VLDLC SERPL CALC-MCNC: 20 MG/DL (ref 5–40)
WBC # BLD AUTO: 6.7 X10E3/UL (ref 3.4–10.8)

## 2018-06-13 ENCOUNTER — OFFICE VISIT (OUTPATIENT)
Dept: CARDIOLOGY CLINIC | Age: 68
End: 2018-06-13

## 2018-07-11 ENCOUNTER — OFFICE VISIT (OUTPATIENT)
Dept: INTERNAL MEDICINE CLINIC | Age: 68
End: 2018-07-11

## 2018-07-11 VITALS
SYSTOLIC BLOOD PRESSURE: 121 MMHG | RESPIRATION RATE: 18 BRPM | HEIGHT: 62 IN | DIASTOLIC BLOOD PRESSURE: 88 MMHG | WEIGHT: 221 LBS | TEMPERATURE: 97.8 F | BODY MASS INDEX: 40.67 KG/M2 | HEART RATE: 100 BPM | OXYGEN SATURATION: 99 %

## 2018-07-11 DIAGNOSIS — J06.9 ACUTE URI: Primary | ICD-10-CM

## 2018-07-11 DIAGNOSIS — R05.9 COUGH: ICD-10-CM

## 2018-07-11 RX ORDER — CODEINE PHOSPHATE AND GUAIFENESIN 10; 100 MG/5ML; MG/5ML
5 SOLUTION ORAL
Qty: 120 ML | Refills: 0 | Status: SHIPPED | OUTPATIENT
Start: 2018-07-11 | End: 2019-04-19

## 2018-07-11 RX ORDER — AZITHROMYCIN 250 MG/1
TABLET, FILM COATED ORAL
Qty: 6 TAB | Refills: 0 | Status: SHIPPED | OUTPATIENT
Start: 2018-07-11 | End: 2018-12-07 | Stop reason: ALTCHOICE

## 2018-07-11 NOTE — PROGRESS NOTES
Reviewed record in preparation for visit and have obtained necessary documentation. Identified pt with two pt identifiers(name and ). Chief Complaint   Patient presents with    Cold Symptoms       Health Maintenance Due   Topic Date Due    Colonoscopy  04/10/1967    Glaucoma Screening   04/10/2014    Bone Mineral Density   04/10/2014    Breast Cancer Screening  2014    Pneumococcal Vaccine (2 of 2 - PPSV23) 2018       Ms. Ortiz Rider has a reminder for a \"due or due soon\" health maintenance. I have asked that she discuss this further with her primary care provider for follow-up on this health maintenance. Coordination of Care Questionnaire:  :     1) Have you been to an emergency room, urgent care clinic since your last visit? no   Hospitalized since your last visit? no             2) Have you seen or consulted any other health care providers outside of [address] [place] [address] since your last visit? no  (Include any pap smears or colon screenings in this section.)    3) In the event something were to happen to you and you were unable to speak on your behalf, do you have an Advance Directive/ Living Will in place stating your wishes? NO    Do you have an Advance Directive on file? no    4) Are you interested in receiving information on Advance Directives? NO    Patient is accompanied by self I have received verbal consent from Dahlia Victor to discuss any/all medical information while they are present in the room.

## 2018-07-11 NOTE — PROGRESS NOTES
Subjective:  Ms. Mily Donald is a pt. of mine who presents with complaint of cough, dry/non- productive. Denies ear pain, sore throat, nasal congestion, runny nose, face pain, headache, joint aches, fatigue. It is reported that her symptoms began about a week ago. Has taken some of her husbands remnant of cough syrup. Objective: Vitals: See nurse notes. General: The patient is in NAD. HEENT: PERRLA. EOMI. OP mild posterior pharyngeal erythema. There is no tenderness to percussion over sinuses. TM: Pearly bilaterally. Neck: Supple. No LAD. Lungs: CTAB. Heart: RRR. No M/G/R. Abd: S, NT, ND. Ext: Warm. No C/C/E. Assessment:  Acute URI. cough. Plan: Symptomatic care (rest, fluids, OTC analgesics, etc). Rx for Anjel-AC for symptom relief. Antibiotic therapy with zpak, only if worsens or fails to improve in next week or so. Call if no better in 10 days, or worsens. Follow-up Disposition:  Return in about 3 months (around 10/11/2018) for HTN.

## 2018-07-11 NOTE — MR AVS SNAPSHOT
[address] [facility] 
[phone] Patient: Michel Michelle MRN:  [id number] Visit Information Date & Time Provider Department Dept. Phone Encounter #  
 7/11/2018 11:15 AM Yoan Medel, [address] [phone] [id number] Follow-up Instructions Return in about 3 months (around 10/11/2018) for HTN. Follow-up and Disposition History Your Appointments 11/20/2018  2:30 PM  
ESTABLISHED PATIENT with Hernán Castaneda MD  
[facility]) Appt Note: SIX MONTH FOLLOW UP-  [place] [id number] [address] [facility]  
[phone] [address] Upcoming Health Maintenance Date Due COLONOSCOPY 4/10/1967 GLAUCOMA SCREENING Q2Y 4/10/2014 Bone Densitometry (Dexa) Screening 4/10/2014 BREAST CANCER SCRN MAMMOGRAM 6/1/2014 Pneumococcal 65+ Low/Medium Risk (2 of 2 - PPSV23) 3/24/2018 Influenza Age 5 to Adult 8/1/2018 DTaP/Tdap/Td series (2 - Td) 3/24/2027 Allergies as of 7/11/2018  Review Complete On: 7/11/2018 By: Alla Weaver Severity Noted Reaction Type Reactions Cephalexin  03/09/2010   Side Effect Itching Contrast Agent [Iodine]  09/23/2011    Hives Lipitor [Atorvastatin]  10/03/2014    Myalgia Pravastatin  05/24/2013    Rash Tramadol  12/15/2015    Other (comments) Caused seizure Current Immunizations  Never Reviewed No immunizations on file. Not reviewed this visit You Were Diagnosed With   
  
 Codes Comments Acute URI    -  Primary ICD-10-CM: J06.9 ICD-9-CM: 465.9 Cough     ICD-10-CM: R05 ICD-9-CM: 532. 2 Vitals BP Pulse Temp Resp Height(growth percentile) Weight(growth percentile)  121/88 (BP 1 Location: Right arm, BP Patient Position: Sitting) 100 97.8 °F (36.6 °C) (Oral) 18 5' 2\" (1.575 m) 221 lb (100.2 kg) SpO2 BMI OB Status Smoking Status 99% 40.42 kg/m2 Hysterectomy Never Smoker BMI and BSA Data Body Mass Index Body Surface Area 40.42 kg/m 2 2.09 m 2 Preferred Pharmacy Pharm[facility] Name Phone [id number] [facility] [phone] Your Updated Medication List  
  
   
This list is accurate as of 7/11/18 11:27 AM.  Always use your most recent med list.  
  
  
  
  
 acetaminophen 500 mg tablet Commonly known as:  [address] Take 2 Tabs by mouth every six (6) hours as needed for Pain. Indications: Pain  
  
 amLODIPine 5 mg tablet Commonly known as:  NORVASC  
take 1 tablet by mouth once daily  
  
 azithromycin 250 mg tablet Commonly known as:  Antonio Guzman Use as directed  
  
 colchicine 0.6 mg tablet Commonly known as:  COLCRYS Take 1 tablet by mouth two (2) times a day. cyclobenzaprine 5 mg tablet Commonly known as:  FLEXERIL  
take 1 tablet by mouth three times a day if needed for muscle spasm  
  
 gabapentin 300 mg capsule Commonly known as:  NEURONTIN Take 1 Cap by mouth nightly. guaiFENesin-codeine 100-10 mg/5 mL solution Commonly known as:  ROBAFEN AC Take 5 mL by mouth three (3) times daily as needed for Cough. Max Daily Amount: 15 mL.  
  
 latanoprost 0.005 % ophthalmic solution Commonly known as:  XALATAN  
instill 1 drop into both eyes at bedtime LISINOPRIL PO Take  by mouth daily. losartan 100 mg tablet Commonly known as:  COZAAR  
take 1 tablet by mouth once daily  
  
 mineral oil-hydrophil petrolat ointment Commonly known as:  AQUAPHOR Apply  to affected area as needed for Dry Skin.  
  
 mometasone 0.1 % topical cream  
Commonly known as:  ELOCON  
apply to affected area daily  
  
 ofloxacin 0.3 % ophthalmic solution Commonly known as:  FLOXIN  
 instill 1 drop into affected eye four times a day START 2 DAYS BEFORE SURGERY  
  
 rosuvastatin 10 mg tablet Commonly known as:  CRESTOR  
take 1 tablet once daily  
  
 spironolactone 25 mg tablet Commonly known as:  ALDACTONE  
take 1 tablet by mouth once daily  
  
 timolol 0.25 % ophthalmic solution Commonly known as:  Mortimer Fish Administer 1-2 drops to both eyes two (2) times a day. use in affected eye(s) * triamcinolone acetonide 0.1 % ointment Commonly known as:  KENALOG Apply  to affected area two (2) times a day. use thin layer * triamcinolone 0.5 % topical cream  
Commonly known as:  ARISTOCORT Apply  to affected area two (2) times a day. use thin layer * Notice: This list has 2 medication(s) that are the same as other medications prescribed for you. Read the directions carefully, and ask your doctor or other care provider to review them with you. Prescriptions Printed Refills  
 guaiFENesin-codeine (ROBAFEN AC) 100-10 mg/5 mL solution 0 Sig: Take 5 mL by mouth three (3) times daily as needed for Cough. Max Daily Amount: 15 mL. Class: Print Route: Oral  
 azithromycin (ZITHROMAX) 250 mg tablet 0 Sig: Use as directed Class: Print Follow-up Instructions Return in about 3 months (around 10/11/2018) for HTN. Introducing [facility] & HEALTH SERVICES! [employer] introduces [employer] patient portal. Now you can access parts of your medical record, email your doctor's office, and request medication refills online. 1. In your internet browser, go to [email or web address] 2. Click on the First Time User? Click Here link in the Sign In box. You will see the New Member Sign Up page. 3. Enter your [employer] Access Code exactly as it appears below. You will not need to use this code after youve completed the sign-up process. If you do not sign up before the expiration date, you must request a new code. · [employer] Access Code: [facility] Expires: 8/13/2018  3:13 PM 
 
4. Enter the last four digits of your Social Security Number (xxxx) and Date of Birth (mm/dd/yyyy) as indicated and click Submit. You will be taken to the next sign-up page. 5. Create a [employer]1 ID. This will be your [employer] login ID and cannot be changed, so think of one that is secure and easy to remember. 6. Create a [employer] password. You can change your password at any time. 7. Enter your Password Reset Question and Answer. This can be used at a later time if you forget your password. 8. Enter your e-mail address. You will receive e-mail notification when new information is available in [address] 9. Click Sign Up. You can now view and download portions of your medical record. 10. Click the Download Summary menu link to download a portable copy of your medical information. If you have questions, please visit the Frequently Asked Questions section of the [employer] website. Remember, [employer] is NOT to be used for urgent needs. For medical emergencies, dial 911. Now available from your iPhone and Android! Please provide this summary of care documentation to your next provider. Your primary care clinician is listed as [place]. If you have any questions after today's visit, please call [phone].

## 2018-07-29 DIAGNOSIS — I10 ESSENTIAL HYPERTENSION: ICD-10-CM

## 2018-07-30 RX ORDER — LOSARTAN POTASSIUM 100 MG/1
TABLET ORAL
Qty: 30 TAB | Refills: 11 | Status: SHIPPED | OUTPATIENT
Start: 2018-07-30 | End: 2019-06-14 | Stop reason: SDUPTHER

## 2018-08-01 ENCOUNTER — TELEPHONE (OUTPATIENT)
Dept: INTERNAL MEDICINE CLINIC | Age: 68
End: 2018-08-01

## 2018-08-01 NOTE — TELEPHONE ENCOUNTER
Patient is to appear in circuit court on 8/15 and she needs a letter from Dr Mar Bravo, stating that she is under his care, that she had a stroke and seizure at the same time, and is still on medications.  She wants to know if she can  this letter on Friday morning. [place] Jeannette number is [phone].        Message received & copied from [facility]

## 2018-08-03 NOTE — TELEPHONE ENCOUNTER
#[phone] pt requesting call as she would like to pickup the letter today that she requested.   Please call

## 2018-12-07 ENCOUNTER — OFFICE VISIT (OUTPATIENT)
Dept: CARDIOLOGY CLINIC | Age: 68
End: 2018-12-07

## 2018-12-07 VITALS
HEART RATE: 64 BPM | SYSTOLIC BLOOD PRESSURE: 120 MMHG | OXYGEN SATURATION: 96 % | BODY MASS INDEX: 40.01 KG/M2 | HEIGHT: 62 IN | DIASTOLIC BLOOD PRESSURE: 62 MMHG | RESPIRATION RATE: 18 BRPM | WEIGHT: 217.4 LBS

## 2018-12-07 DIAGNOSIS — E66.01 OBESITY, MORBID (HCC): ICD-10-CM

## 2018-12-07 DIAGNOSIS — R55 SYNCOPE AND COLLAPSE: Primary | ICD-10-CM

## 2018-12-07 DIAGNOSIS — R60.0 BILATERAL LEG EDEMA: ICD-10-CM

## 2018-12-07 DIAGNOSIS — E78.2 MIXED HYPERLIPIDEMIA: ICD-10-CM

## 2018-12-07 DIAGNOSIS — Z91.14 NONCOMPLIANCE WITH MEDICATION REGIMEN: ICD-10-CM

## 2018-12-07 DIAGNOSIS — I87.2 CHRONIC VENOUS INSUFFICIENCY: ICD-10-CM

## 2018-12-07 DIAGNOSIS — Z98.84 LAP-BAND SURGERY STATUS: ICD-10-CM

## 2018-12-07 NOTE — PROGRESS NOTES
[address], [address] [facility]. [address] NEW PATIENT HPI/FOLLOW-UP 
 
NAME:  Tootie Kilpatrick :   1950 MRN:   [id number] DATE:             2018 PCP:  Kelly Kennedy MD 
 
    
 
Subjective: The patient is a 70y.o. year old female  who returns for a routine follow-up. Since the last visit, patient reports no new symptoms. Denies change in exercise tolerance, chest pain, edema, medication intolerance, palpitations, shortness of breath, PND/orthopnea wheezing, sputum, syncope, dizziness or light headedness. Doing satisfactorily. Review of Systems: 
 
 [] Unable to obtain  ROS due to  []mental status change  []sedated   []intubated 
 [x]Total of 12 systems reviewed as follows: 
Constitutional: negative fever, negative chills, negative weight loss Eyes:   negative visual changes ENT:   negative sore throat, tongue or lip swelling Chest/Resp:  negative cough, wheezing, negative dyspnea,tenderness Cards:  negative for chest pain, decreased exercise endurance, palpitations, lower extremity edema,PND,orthopnea,syncpoe,dizziness,lightheadedness GI:   negative for nausea, vomiting, diarrhea, and abdominal pain :  negative for frequency, dysuria Integument:  negative for rash and pruritus Heme:  negative for easy bruising and gum/nose bleeding Musculoskel: negative for myalgias,  back pain and muscle weakness Neuro:  negative for headaches, dizziness, vertigo Psych:  negative for feelings of anxiety, depression Past Medical History:  
Diagnosis Date  CAD (coronary artery disease) MI   
 [facility]  
 cervical; no chemo  Cerebral artery occlusion with cerebral infarction ([facility]  Epilepsy ([facility]  Essential hypertension  Gastrointestinal disorder  Glaucoma  HHD (hypertensive heart disease)  Hypercholesterolemia  Obesity  Seizure ([facility]  Stroke ([facility]  [facility] R leg; same leg as cardiac cath Patient Active Problem List  
 Diagnosis Date Noted  Obesity, morbid ([facility] 05/15/2018  Stroke [facility]) 11/24/2015  Syncope and collapse 05/24/2013  Bilateral leg edema 04/19/2013  Gout 11/09/2011  Plantar fasciitis 09/15/2010  Noncompliance with medication regimen 05/28/2010  
 HTN (hypertension) 01/08/2010  HLD (hyperlipidemia) 01/08/2010  
 BUSBY (nonalcoholic steatohepatitis) 01/08/2010  CAD (coronary artery disease) 01/08/2010  Chronic venous insufficiency 01/08/2010  LAP-BAND surgery status 01/08/2010 Past Surgical History:  
Procedure Laterality Date  HC LAP BAND ADJUST PROCEDURE  2008  HX APPENDECTOMY  HX CHOLECYSTECTOMY  HX HYSTERECTOMY  HX ORTHOPAEDIC    
 foot surgery  HX TONSILLECTOMY Allergies Allergen Reactions  Cephalexin Itching  Contrast Agent [Iodine] Hives  Lipitor [Atorvastatin] Myalgia  Pravastatin Rash  Tramadol Other (comments) Caused seizure Family History Problem Relation Age of Onset  Hypertension Mother Social History Socioeconomic History  Marital status:  Spouse name: Not on file  Number of children: Not on file  Years of education: Not on file  Highest education level: Not on file Social Needs  Financial resource strain: Not on file  Food insecurity - worry: Not on file  Food insecurity - inability: Not on file  Transportation needs - medical: Not on file  Transportation needs - non-medical: Not on file Occupational History  Not on file Tobacco Use  Smoking status: Never Smoker  Smokeless tobacco: Never Used Substance and Sexual Activity  Alcohol use: No  
 Drug use: No  
 Sexual activity: Not Currently Other Topics Concern  Not on file Social History Narrative  Not on file Current Outpatient Medications Medication Sig  
 losartan (COZAAR) 100 mg tablet take 1 tablet by mouth once daily  levETIRAcetam (KEPPRA) 500 mg tablet take 2 tablets by mouth twice a day  
 guaiFENesin-codeine (ROBAFEN AC) 100-10 mg/5 mL solution Take 5 mL by mouth three (3) times daily as needed for Cough. Max Daily Amount: 15 mL.  azithromycin (ZITHROMAX) 250 mg tablet Use as directed  latanoprost (XALATAN) 0.005 % ophthalmic solution instill 1 drop into both eyes at bedtime  triamcinolone (ARISTOCORT) 0.5 % topical cream Apply  to affected area two (2) times a day. use thin layer  spironolactone (ALDACTONE) 25 mg tablet take 1 tablet by mouth once daily  mometasone (ELOCON) 0.1 % topical cream apply to affected area daily  amLODIPine (NORVASC) 5 mg tablet take 1 tablet by mouth once daily  rosuvastatin (CRESTOR) 10 mg tablet take 1 tablet once daily  acetaminophen (TYLENOL EXTRA STRENGTH) 500 mg tablet Take 2 Tabs by mouth every six (6) hours as needed for Pain. Indications: Pain  LISINOPRIL PO Take  by mouth daily.  gabapentin (NEURONTIN) 300 mg capsule Take 1 Cap by mouth nightly.  cyclobenzaprine (FLEXERIL) 5 mg tablet take 1 tablet by mouth three times a day if needed for muscle spasm  ofloxacin (FLOXIN) 0.3 % ophthalmic solution instill 1 drop into affected eye four times a day START 2 DAYS BEFORE SURGERY  triamcinolone acetonide (KENALOG) 0.1 % ointment Apply  to affected area two (2) times a day. use thin layer  mineral oil-hydrophil petrolat (AQUAPHOR) ointment Apply  to affected area as needed for Dry Skin.  colchicine (COLCRYS) 0.6 mg tablet Take 1 tablet by mouth two (2) times a day. (Patient taking differently: Take 0.6 mg by mouth two (2) times daily as needed.)  timolol (BETIMOL) 0.25 % ophthalmic solution Administer 1-2 drops to both eyes two (2) times a day. use in affected eye(s) No current facility-administered medications for this visit. I have reviewed the nurses notes, vitals, problem list, allergy list, medical history, family medical, social history and medications. Objective:  
 
Physical Exam:  
 
Vitals:  
 12/07/18 1459 12/07/18 1507 BP: 128/62 120/62 Pulse: 64 Resp: 18 SpO2: 96% Weight: 217 lb 6.4 oz (98.6 kg) Height: 5' 2\" (1.575 m) Body mass index is 39.76 kg/m². General: Well developed, in no acute distress. HEENT: No carotid bruits, no JVD, trach is midline. Heart:  Normal S1/S2 negative S3 or S4. Regular, no murmur, gallop or rub.  
Respiratory: Clear bilaterally, no wheezing or rales Abdomen:   Soft, non-tender, bowel sounds are active.  
Extremities:  No edema, normal cap refill, no cyanosis. Neuro: A&Ox3, speech clear, gait stable. Skin: Skin color is normal. No rashes or lesions. No diaphoresis. Vascular: 2+ pulses symmetric in all extremities Data Review:  
 
 
Cardiographics: 
 
EKG: NSR,old inferior MI Cardiology Labs: 
 
Results for orders placed or performed during the hospital encounter of 07/18/17 EKG, 12 LEAD, INITIAL Result Value Ref Range Ventricular Rate 87 BPM  
 Atrial Rate 87 BPM  
 P-R Interval 162 ms QRS Duration 98 ms Q-T Interval 380 ms QTC Calculation (Bezet) 457 ms Calculated P Axis 50 degrees Calculated R Axis -23 degrees Calculated T Axis 77 degrees Diagnosis Normal sinus rhythm Moderate voltage criteria for LVH, may be normal variant Inferior infarct (cited on or before 28-SEP-2011) Confirmed by Bryanna Ramirez ([id number]) on 7/19/2017 8:25:29 AM 
  
 
 
Lab Results Component Value Date/Time Cholesterol, total 156 06/05/2018 04:08 PM  
 HDL Cholesterol 55 06/05/2018 04:08 PM  
 LDL, calculated 81 06/05/2018 04:08 PM  
 Triglyceride 99 06/05/2018 04:08 PM  
 CHOL/HDL Ratio 3.8 11/25/2015 04:04 AM  
 
 
Lab Results Component Value Date/Time Sodium 139 06/05/2018 04:08 PM  
 Potassium 4.4 06/05/2018 04:08 PM  
 Chloride 106 06/05/2018 04:08 PM  
 CO2 18 06/05/2018 04:08 PM  
 Anion gap 3 (L) 07/18/2017 05:10 PM  
 Glucose 89 06/05/2018 04:08 PM  
 BUN 22 06/05/2018 04:08 PM  
 Creatinine 1.09 (H) 06/05/2018 04:08 PM  
 BUN/Creatinine ratio 20 06/05/2018 04:08 PM  
 GFR est AA 60 06/05/2018 04:08 PM  
 GFR est non-AA 52 (L) 06/05/2018 04:08 PM  
 Calcium 9.7 06/05/2018 04:08 PM  
 Bilirubin, total 0.7 06/05/2018 04:08 PM  
 AST (SGOT) 39 06/05/2018 04:08 PM  
 Alk. phosphatase 87 06/05/2018 04:08 PM  
 Protein, total 8.1 06/05/2018 04:08 PM  
 Albumin 3.8 06/05/2018 04:08 PM  
 Globulin 5.3 (H) 07/18/2017 05:10 PM  
 A-G Ratio 0.9 (L) 06/05/2018 04:08 PM  
 ALT (SGPT) 38 (H) 06/05/2018 04:08 PM  
  
 
 
Assessment: ICD-10-CM ICD-9-CM 1. Syncope and collapse R55 780.2 AMB POC EKG ROUTINE W/ 12 LEADS, INTER & REP 2. Obesity, morbid ([facility] E66.01 278.01 AMB POC EKG ROUTINE W/ 12 LEADS, INTER & REP 3. Noncompliance with medication regimen Z91.14 V15.81 AMB POC EKG ROUTINE W/ 12 LEADS, INTER & REP 4. LAP-BAND surgery status Z98.84 V45.86 AMB POC EKG ROUTINE W/ 12 LEADS, INTER & REP 5. Mixed hyperlipidemia E78.2 272.2 AMB POC EKG ROUTINE W/ 12 LEADS, INTER & REP 6. Chronic venous insufficiency I87.2 459.81 AMB POC EKG ROUTINE W/ 12 LEADS, INTER & REP 7. Bilateral leg edema R60.0 782.3 AMB POC EKG ROUTINE W/ 12 LEADS, INTER & REP Discussion: Patient presents at this time stable from a cardiac perspective. No cardiac issues. No recurrent syncope. Pleased with present cardiac status. Plan: 1. Continue same meds. Lipid profile and labs followed by PCP. 2.Encouraged to exercise to tolerance, lose weight and follow low fat, low cholesterol, low sodium predominantly Plant-based (consider Mediterranean) diet. Call with questions or concerns. Will follow up any test results by phone and/or f/u here in office if needed. Romana Moya 3.Follow up: 6 MONTHS I have discussed the diagnosis with the patient and the intended plan as seen in the above orders. The patient has received an after-visit summary and questions were answered concerning future plans. I have discussed any concerning medication side effects and warnings with the patient as well.  
 
Celia Ambrosio MD

## 2018-12-07 NOTE — PROGRESS NOTES
1. Have you been to the ER, urgent care clinic since your last visit? Hospitalized since your last visit? NO 
 
2. Have you seen or consulted any other health care providers outside of the [address] [place] [address] since your last visit? Include any pap smears or colon screening. NO 
 
6 MONTH FOLLOW UP. NO CARDIAC C/O.

## 2019-01-04 ENCOUNTER — OFFICE VISIT (OUTPATIENT)
Dept: INTERNAL MEDICINE CLINIC | Age: 69
End: 2019-01-04

## 2019-01-04 VITALS
DIASTOLIC BLOOD PRESSURE: 83 MMHG | TEMPERATURE: 97.3 F | SYSTOLIC BLOOD PRESSURE: 120 MMHG | RESPIRATION RATE: 18 BRPM | BODY MASS INDEX: 38.9 KG/M2 | OXYGEN SATURATION: 97 % | HEIGHT: 62 IN | HEART RATE: 92 BPM | WEIGHT: 211.4 LBS

## 2019-01-04 DIAGNOSIS — M10.9 GOUT, UNSPECIFIED CAUSE, UNSPECIFIED CHRONICITY, UNSPECIFIED SITE: ICD-10-CM

## 2019-01-04 DIAGNOSIS — R09.89 BRUIT OF RIGHT CAROTID ARTERY: ICD-10-CM

## 2019-01-04 DIAGNOSIS — M54.5 LOW BACK PAIN, UNSPECIFIED BACK PAIN LATERALITY, UNSPECIFIED CHRONICITY, WITH SCIATICA PRESENCE UNSPECIFIED: Primary | ICD-10-CM

## 2019-01-04 DIAGNOSIS — M25.562 LEFT KNEE PAIN, UNSPECIFIED CHRONICITY: ICD-10-CM

## 2019-01-04 DIAGNOSIS — R56.9 SEIZURE (HCC): ICD-10-CM

## 2019-01-04 DIAGNOSIS — I25.10 CORONARY ARTERY DISEASE INVOLVING NATIVE CORONARY ARTERY OF NATIVE HEART WITHOUT ANGINA PECTORIS: ICD-10-CM

## 2019-01-04 DIAGNOSIS — E78.2 MIXED HYPERLIPIDEMIA: ICD-10-CM

## 2019-01-04 DIAGNOSIS — I10 ESSENTIAL HYPERTENSION: ICD-10-CM

## 2019-01-04 DIAGNOSIS — K75.81 NASH (NONALCOHOLIC STEATOHEPATITIS): ICD-10-CM

## 2019-01-04 DIAGNOSIS — E66.01 OBESITY, MORBID (HCC): ICD-10-CM
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
## 2019-01-08 ENCOUNTER — TELEPHONE (OUTPATIENT)
Dept: INTERNAL MEDICINE CLINIC | Age: 69
End: 2019-01-08

## 2019-01-08 DIAGNOSIS — M54.9 BACK PAIN, UNSPECIFIED BACK LOCATION, UNSPECIFIED BACK PAIN LATERALITY, UNSPECIFIED CHRONICITY: Primary | ICD-10-CM

## 2019-01-09 RX ORDER — OXYCODONE AND ACETAMINOPHEN 5; 325 MG/1; MG/1
1 TABLET ORAL
Qty: 15 TAB | Refills: 0 | Status: SHIPPED | OUTPATIENT
Start: 2019-01-09 | End: 2019-03-12 | Stop reason: SDUPTHER

## 2019-01-09 RX ORDER — MELOXICAM 15 MG/1
15 TABLET ORAL DAILY
Qty: 30 TAB | Refills: 6 | Status: SHIPPED | OUTPATIENT
Start: 2019-01-09 | End: 2019-04-19

## 2019-01-10 ENCOUNTER — TELEPHONE (OUTPATIENT)
Dept: INTERNAL MEDICINE CLINIC | Age: 69
End: 2019-01-10

## 2019-01-10 DIAGNOSIS — M25.551 RIGHT HIP PAIN: Primary | ICD-10-CM

## 2019-01-10 NOTE — TELEPHONE ENCOUNTER
Identified patient 2 identifiers verified. Patient reports right hip pain especially when mobile  Requesting x-ray for pain at level 9.

## 2019-01-10 NOTE — TELEPHONE ENCOUNTER
Patient is requesting an ultra sound of her right hip. She is in a lot of pain. Please call the patient. Thanks.

## 2019-02-06 ENCOUNTER — DOCUMENTATION ONLY (OUTPATIENT)
Dept: INTERNAL MEDICINE CLINIC | Age: 69
End: 2019-02-06

## 2019-02-06 NOTE — PROGRESS NOTES
Pt delivered from from [facility] for Dr. Norma Andujar to complete. Turnaround time and potential fee discussed. Form placed in Dr. Valentine Jorgensen box.

## 2019-02-11 ENCOUNTER — HOSPITAL ENCOUNTER (EMERGENCY)
Age: 69
Discharge: HOME | End: 2019-02-11
Attending: EMERGENCY MEDICINE
Payer: MEDICARE

## 2019-02-11 VITALS
HEART RATE: 98 BPM | DIASTOLIC BLOOD PRESSURE: 79 MMHG | RESPIRATION RATE: 18 BRPM | WEIGHT: 215 LBS | TEMPERATURE: 98.6 F | HEIGHT: 62 IN | BODY MASS INDEX: 39.56 KG/M2 | OXYGEN SATURATION: 100 % | SYSTOLIC BLOOD PRESSURE: 147 MMHG

## 2019-02-11 DIAGNOSIS — M25.551 LATERAL PAIN OF RIGHT HIP: Primary | ICD-10-CM

## 2019-02-11 PROCEDURE — 74011250637 HC RX REV CODE- 250/637: Performed by: EMERGENCY MEDICINE

## 2019-02-11 PROCEDURE — 74011000250 HC RX REV CODE- 250: Performed by: EMERGENCY MEDICINE

## 2019-02-11 PROCEDURE — 99283 EMERGENCY DEPT VISIT LOW MDM: CPT

## 2019-02-11 PROCEDURE — 75810000123 HC INJ'S ANES/STEROID AGT PERIPH NERVE

## 2019-02-11 RX ORDER — NAPROXEN 250 MG/1
500 TABLET ORAL
Status: COMPLETED | OUTPATIENT
Start: 2019-02-11 | End: 2019-02-11

## 2019-02-11 RX ORDER — BUPIVACAINE HYDROCHLORIDE 5 MG/ML
10 INJECTION, SOLUTION EPIDURAL; INTRACAUDAL
Status: COMPLETED | OUTPATIENT
Start: 2019-02-11 | End: 2019-02-11

## 2019-02-11 RX ORDER — NAPROXEN 500 MG/1
500 TABLET ORAL 2 TIMES DAILY WITH MEALS
Qty: 10 TAB | Refills: 0 | Status: SHIPPED | OUTPATIENT
Start: 2019-02-11 | End: 2019-02-16

## 2019-02-11 RX ADMIN — BUPIVACAINE HYDROCHLORIDE 50 MG: 5 INJECTION, SOLUTION EPIDURAL; INTRACAUDAL; PERINEURAL at 16:28

## 2019-02-11 RX ADMIN — NAPROXEN 500 MG: 250 TABLET ORAL at 16:28

## 2019-02-11 NOTE — ED TRIAGE NOTES
Patient co right hip pain x 2 months, states she has been to PCP and had xray taken and told it was ok, then told to go have an MRI, which was performed at [place] . Has an appt on march 1st but states the pain is too excruciating.

## 2019-02-11 NOTE — ED NOTES
Dr. Juan Yan has reviewed discharge instructions with patient and spouse including prescription(s) if applicable. Patient has received and verbalizes understanding of all instructions and has no further questions at this time. Patient exits ED via ambulatory accompanied by spouse. Patient in no acute distress.

## 2019-02-11 NOTE — DISCHARGE INSTRUCTIONS
Patient Education        Hip Pain: Care Instructions  Your Care Instructions    Hip pain may be caused by many things, including overuse, a fall, or a twisting movement. Another cause of hip pain is arthritis. Your pain may increase when you stand up, walk, or squat. The pain may come and go or may be constant. Home treatment can help relieve hip pain, swelling, and stiffness. If your pain is ongoing, you may need more tests and treatment. Follow-up care is a key part of your treatment and safety. Be sure to make and go to all appointments, and call your doctor if you are having problems. It's also a good idea to know your test results and keep a list of the medicines you take. How can you care for yourself at home? · Take pain medicines exactly as directed. ? If the doctor gave you a prescription medicine for pain, take it as prescribed. ? If you are not taking a prescription pain medicine, ask your doctor if you can take an over-the-counter medicine. · Rest and protect your hip. Take a break from any activity, including standing or walking, that may cause pain. · Put ice or a cold pack against your hip for 10 to 20 minutes at a time. Try to do this every 1 to 2 hours for the next 3 days (when you are awake) or until the swelling goes down. Put a thin cloth between the ice and your skin. · Sleep on your healthy side with a pillow between your knees, or sleep on your back with pillows under your knees. · If there is no swelling, you can put moist heat, a heating pad, or a warm cloth on your hip. Do gentle stretching exercises to help keep your hip flexible. · Learn how to prevent falls. Have your vision and hearing checked regularly. Wear slippers or shoes with a nonskid sole. · Stay at a healthy weight. · Wear comfortable shoes. When should you call for help? Call 911 anytime you think you may need emergency care.  For example, call if:    · You have sudden chest pain and shortness of breath, or you cough up blood.     · You are not able to stand or walk or bear weight.     · Your buttocks, legs, or feet feel numb or tingly.     · Your leg or foot is cool or pale or changes color.     · You have severe pain.    Call your doctor now or seek immediate medical care if:    · You have signs of infection, such as:  ? Increased pain, swelling, warmth, or redness in the hip area. ? Red streaks leading from the hip area. ? Pus draining from the hip area. ? A fever.     · You have signs of a blood clot, such as:  ? Pain in your calf, back of the knee, thigh, or groin. ? Redness and swelling in your leg or groin.     · You are not able to bend, straighten, or move your leg normally.     · You have trouble urinating or having bowel movements.    Watch closely for changes in your health, and be sure to contact your doctor if:    · You do not get better as expected. Where can you learn more? Go to http://lanre-jarocho.info/. Enter E792 in the search box to learn more about \"Hip Pain: Care Instructions. \"  Current as of: September 23, 2018  Content Version: 11.9  © 7046-1840 [employer]. Care instructions adapted under license by [employer] (which disclaims liability or warranty for this information). If you have questions about a medical condition or this instruction, always ask your healthcare professional. [place] [address] any warranty or liability for your use of this information.
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
## 2019-03-12 ENCOUNTER — TELEPHONE (OUTPATIENT)
Dept: INTERNAL MEDICINE CLINIC | Age: 69
End: 2019-03-12

## 2019-03-12 DIAGNOSIS — M54.9 BACK PAIN, UNSPECIFIED BACK LOCATION, UNSPECIFIED BACK PAIN LATERALITY, UNSPECIFIED CHRONICITY: ICD-10-CM

## 2019-03-12 NOTE — TELEPHONE ENCOUNTER
#[phone] pt calling on document she brought for Dr. Ronel Mtz to fill out a month ago for the [facility] and hasn't heard anything. Please call pt today to update. Pt states she must hear from you today on this form.   Please call

## 2019-03-12 NOTE — TELEPHONE ENCOUNTER
#[phone] pt states she has such pain even after the procedure to her right hip pt is asking for this script to be refilled as soon as possible. Please call pt when ready for . Pt is still in a lot of pain and is asking again for a pain medication.   Please call

## 2019-03-13 NOTE — TELEPHONE ENCOUNTER
Patient is requesting a refill for \"pain medication\" to help with discomfort after having surgery to be sent to pharmacy on file [facility] ([phone]) Best contact [phone]       Message received & copied from [facility] after closing on 3/12/19

## 2019-03-13 NOTE — TELEPHONE ENCOUNTER
Patient states she needs a call back to get the status of her request for pain medication. Please call to update status.  Thank you

## 2019-03-14 NOTE — TELEPHONE ENCOUNTER
Identified patient 2 identifiers verified. Patient aware that she needs blood work Benito Yoon level to complete form.

## 2019-03-15 RX ORDER — OXYCODONE AND ACETAMINOPHEN 5; 325 MG/1; MG/1
1 TABLET ORAL
Qty: 15 TAB | Refills: 0 | Status: SHIPPED | OUTPATIENT
Start: 2019-03-15 | End: 2019-12-09 | Stop reason: SDUPTHER

## 2019-03-19 LAB
ALBUMIN SERPL-MCNC: 3.7 G/DL (ref 3.6–4.8)
ALBUMIN/GLOB SERPL: 1.1 {RATIO} (ref 1.2–2.2)
ALP SERPL-CCNC: 87 IU/L (ref 39–117)
ALT SERPL-CCNC: 32 IU/L (ref 0–32)
AST SERPL-CCNC: 42 IU/L (ref 0–40)
BASOPHILS # BLD AUTO: 0 X10E3/UL (ref 0–0.2)
BASOPHILS NFR BLD AUTO: 0 %
BILIRUB SERPL-MCNC: 0.6 MG/DL (ref 0–1.2)
BUN SERPL-MCNC: 10 MG/DL (ref 8–27)
BUN/CREAT SERPL: 10 (ref 12–28)
CALCIUM SERPL-MCNC: 9.3 MG/DL (ref 8.7–10.3)
CHLORIDE SERPL-SCNC: 107 MMOL/L (ref 96–106)
CHOLEST SERPL-MCNC: 167 MG/DL (ref 100–199)
CO2 SERPL-SCNC: 17 MMOL/L (ref 20–29)
CREAT SERPL-MCNC: 1.04 MG/DL (ref 0.57–1)
EOSINOPHIL # BLD AUTO: 0.1 X10E3/UL (ref 0–0.4)
EOSINOPHIL NFR BLD AUTO: 2 %
ERYTHROCYTE [DISTWIDTH] IN BLOOD BY AUTOMATED COUNT: 13.8 % (ref 12.3–15.4)
GLOBULIN SER CALC-MCNC: 3.5 G/DL (ref 1.5–4.5)
GLUCOSE SERPL-MCNC: 74 MG/DL (ref 65–99)
HCT VFR BLD AUTO: 39 % (ref 34–46.6)
HDLC SERPL-MCNC: 53 MG/DL
HGB BLD-MCNC: 12.8 G/DL (ref 11.1–15.9)
IMM GRANULOCYTES # BLD AUTO: 0 X10E3/UL (ref 0–0.1)
IMM GRANULOCYTES NFR BLD AUTO: 0 %
LDLC SERPL CALC-MCNC: 94 MG/DL (ref 0–99)
LEVETIRACETAM SERPL-MCNC: 1.1 UG/ML (ref 10–40)
LYMPHOCYTES # BLD AUTO: 1.8 X10E3/UL (ref 0.7–3.1)
LYMPHOCYTES NFR BLD AUTO: 33 %
MCH RBC QN AUTO: 30.8 PG (ref 26.6–33)
MCHC RBC AUTO-ENTMCNC: 32.8 G/DL (ref 31.5–35.7)
MCV RBC AUTO: 94 FL (ref 79–97)
MONOCYTES # BLD AUTO: 0.4 X10E3/UL (ref 0.1–0.9)
MONOCYTES NFR BLD AUTO: 8 %
NEUTROPHILS # BLD AUTO: 3.2 X10E3/UL (ref 1.4–7)
NEUTROPHILS NFR BLD AUTO: 57 %
PLATELET # BLD AUTO: 215 X10E3/UL (ref 150–379)
POTASSIUM SERPL-SCNC: 3.7 MMOL/L (ref 3.5–5.2)
PROT SERPL-MCNC: 7.2 G/DL (ref 6–8.5)
RBC # BLD AUTO: 4.16 X10E6/UL (ref 3.77–5.28)
SODIUM SERPL-SCNC: 144 MMOL/L (ref 134–144)
TRIGL SERPL-MCNC: 98 MG/DL (ref 0–149)
URATE SERPL-MCNC: 8 MG/DL (ref 2.5–7.1)
VLDLC SERPL CALC-MCNC: 20 MG/DL (ref 5–40)
WBC # BLD AUTO: 5.6 X10E3/UL (ref 3.4–10.8)

## 2019-03-20 ENCOUNTER — HOSPITAL ENCOUNTER (OUTPATIENT)
Dept: VASCULAR SURGERY | Age: 69
Discharge: HOME | End: 2019-03-20
Attending: INTERNAL MEDICINE
Payer: COMMERCIAL

## 2019-03-20 DIAGNOSIS — R09.89 BRUIT OF RIGHT CAROTID ARTERY: ICD-10-CM

## 2019-03-20 PROBLEM — I65.23 BILATERAL CAROTID ARTERY STENOSIS: Status: ACTIVE | Noted: 2019-03-20

## 2019-03-20 LAB
LEFT CCA DIST DIAS: 15 CM/S
LEFT CCA DIST SYS: 62.5 CM/S
LEFT CCA PROX DIAS: 13.2 CM/S
LEFT CCA PROX SYS: 67.9 CM/S
LEFT ECA DIAS: 10.4 CM/S
LEFT ECA SYS: 45.6 CM/S
LEFT ICA DIST DIAS: 13.9 CM/S
LEFT ICA DIST SYS: 44.2 CM/S
LEFT ICA MID DIAS: 22.3 CM/S
LEFT ICA MID SYS: 60.6 CM/S
LEFT ICA PROX DIAS: 18.6 CM/S
LEFT ICA PROX SYS: 46 CM/S
LEFT ICA/CCA SYS: 1
LEFT SUBCLAVIAN DIAS: 13.2 CM/S
LEFT SUBCLAVIAN SYS: 93.5 CM/S
LEFT VERTEBRAL DIAS: 0 CM/S
LEFT VERTEBRAL SYS: 0 CM/S
RIGHT CCA DIST DIAS: 17.5 CM/S
RIGHT CCA DIST SYS: 77.4 CM/S
RIGHT CCA PROX DIAS: 15 CM/S
RIGHT CCA PROX SYS: 75.3 CM/S
RIGHT ECA DIAS: 18.6 CM/S
RIGHT ECA SYS: 55.2 CM/S
RIGHT ICA DIST DIAS: 15 CM/S
RIGHT ICA DIST SYS: 64.3 CM/S
RIGHT ICA MID DIAS: 13.1 CM/S
RIGHT ICA MID SYS: 62.4 CM/S
RIGHT ICA PROX DIAS: 16.2 CM/S
RIGHT ICA PROX SYS: 68.8 CM/S
RIGHT ICA/CCA SYS: 0.9
RIGHT SUBCLAVIAN DIAS: 0 CM/S
RIGHT SUBCLAVIAN SYS: 111.8 CM/S
RIGHT VERTEBRAL DIAS: 16.8 CM/S
RIGHT VERTEBRAL SYS: 49.7 CM/S

## 2019-03-20 PROCEDURE — 93880 EXTRACRANIAL BILAT STUDY: CPT

## 2019-03-25 ENCOUNTER — TELEPHONE (OUTPATIENT)
Dept: INTERNAL MEDICINE CLINIC | Age: 69
End: 2019-03-25

## 2019-04-15 ENCOUNTER — TELEPHONE (OUTPATIENT)
Dept: INTERNAL MEDICINE CLINIC | Age: 69
End: 2019-04-15

## 2019-04-15 NOTE — TELEPHONE ENCOUNTER
Pt is requesting Dr. Jae Hopper to contact her regarding paperwork that was filled out incorrectly. She stated DMV formed her, \"without the correct paperwork, her license will be suspended.  Pt's Best Contact Number:   [phone]         Message received & copied from [facility]

## 2019-04-15 NOTE — TELEPHONE ENCOUNTER
Identified patient 2 identifiers verified. Patient reports that she needs to bring a DMV form for Dr. Rick Tran to complete so that she will not loose her drivers license.

## 2019-04-16 ENCOUNTER — TELEPHONE (OUTPATIENT)
Dept: INTERNAL MEDICINE CLINIC | Age: 69
End: 2019-04-16

## 2019-04-16 NOTE — TELEPHONE ENCOUNTER
Spoke with DMV medication list is needed. Patient's Evalene Giuliano was 7/23/18 her level was  1.1 on 3/15/19, not sure if she is taking this. Call placed to patient  For any changes in medications.

## 2019-04-18 RX ORDER — AMLODIPINE BESYLATE 5 MG/1
TABLET ORAL
Qty: 30 TAB | Refills: 0 | Status: SHIPPED | OUTPATIENT
Start: 2019-04-18 | End: 2019-06-10 | Stop reason: SDUPTHER

## 2019-04-18 RX ORDER — SPIRONOLACTONE 25 MG/1
TABLET ORAL
Qty: 30 TAB | Refills: 0 | Status: SHIPPED | OUTPATIENT
Start: 2019-04-18 | End: 2019-06-10 | Stop reason: SDUPTHER

## 2019-04-18 RX ORDER — LATANOPROST 50 UG/ML
1 SOLUTION/ DROPS OPHTHALMIC
COMMUNITY
Start: 2018-11-25 | End: 2019-06-14 | Stop reason: SDUPTHER

## 2019-04-18 NOTE — TELEPHONE ENCOUNTER
Patient called in medications she is now taking. She needs copy  Of updated medication medication list for DMV.

## 2019-04-18 NOTE — TELEPHONE ENCOUNTER
Pt is calling to give Kindra the list of medication to fax the DMV   levetiracetam 500 mg 2 tab 2x daily   meclivine 25 mg 1 tab PO 3x daily   rosubastin 10 mg I tab daily   Losartan 100 mg I tab daily   amoldipine besylate 5 mg 1 tab daily   stirlnolactone 25 mg 1 tab daily   Latanoprost 0.005% eye drops 1 at night   Pts contact        Message received & copied from [facility]

## 2019-04-19 NOTE — TELEPHONE ENCOUNTER
Patient called a second time to give a list of medications she is currently taking. Would like the practice to call Dr. Irineo Beard' nurse Sindhu Kruse to give a list of medications she is currently taking. The doctor' s office number is [phone].  Best contact number for patient is [phone]      Message received & copied from [facility]

## 2019-04-19 NOTE — TELEPHONE ENCOUNTER
Identified patient 2 identifiers verified. Patient made aware that [facility]  Forms have been faxed  To [phone] , [phone]. Patient will  copy from .

## 2019-04-29 ENCOUNTER — TELEPHONE (OUTPATIENT)
Dept: INTERNAL MEDICINE CLINIC | Age: 69
End: 2019-04-29

## 2019-04-29 ENCOUNTER — HOSPITAL ENCOUNTER (OUTPATIENT)
Dept: GENERAL RADIOLOGY | Age: 69
Discharge: HOME | End: 2019-04-29
Attending: ORTHOPAEDIC SURGERY
Payer: MEDICARE

## 2019-04-29 DIAGNOSIS — M16.11 PRIMARY OSTEOARTHRITIS OF RIGHT HIP: ICD-10-CM

## 2019-04-29 DIAGNOSIS — M25.551 RIGHT HIP PAIN: ICD-10-CM

## 2019-04-29 PROCEDURE — 74011000250 HC RX REV CODE- 250: Performed by: RADIOLOGY

## 2019-04-29 PROCEDURE — 20610 DRAIN/INJ JOINT/BURSA W/O US: CPT

## 2019-04-29 PROCEDURE — 74011250636 HC RX REV CODE- 250/636: Performed by: RADIOLOGY

## 2019-04-29 PROCEDURE — 74011636320 HC RX REV CODE- 636/320: Performed by: RADIOLOGY

## 2019-04-29 RX ORDER — TRIAMCINOLONE ACETONIDE 40 MG/ML
40 INJECTION, SUSPENSION INTRA-ARTICULAR; INTRAMUSCULAR
Status: COMPLETED | OUTPATIENT
Start: 2019-04-29 | End: 2019-04-29

## 2019-04-29 RX ORDER — LIDOCAINE HYDROCHLORIDE 10 MG/ML
4 INJECTION, SOLUTION EPIDURAL; INFILTRATION; INTRACAUDAL; PERINEURAL
Status: COMPLETED | OUTPATIENT
Start: 2019-04-29 | End: 2019-04-29

## 2019-04-29 RX ORDER — BUPIVACAINE HYDROCHLORIDE 5 MG/ML
3 INJECTION, SOLUTION EPIDURAL; INTRACAUDAL
Status: COMPLETED | OUTPATIENT
Start: 2019-04-29 | End: 2019-04-29

## 2019-04-29 RX ADMIN — TRIAMCINOLONE ACETONIDE 40 MG: 40 INJECTION, SUSPENSION INTRA-ARTICULAR; INTRAMUSCULAR at 12:10

## 2019-04-29 RX ADMIN — BUPIVACAINE HYDROCHLORIDE 15 MG: 5 INJECTION, SOLUTION EPIDURAL; INTRACAUDAL at 12:10

## 2019-04-29 RX ADMIN — IOHEXOL 20 ML: 180 INJECTION INTRAVENOUS at 12:10

## 2019-04-29 RX ADMIN — LIDOCAINE HYDROCHLORIDE 8 ML: 10 INJECTION, SOLUTION EPIDURAL; INFILTRATION; INTRACAUDAL; PERINEURAL at 12:10

## 2019-04-29 NOTE — TELEPHONE ENCOUNTER
Identified patient 2 identifiers verified. Labs reviewed with patient  Plan of treatment, appointment with Shivani Galaviz on 5/15/19.

## 2019-04-29 NOTE — TELEPHONE ENCOUNTER
----- Message from Demetri Velasco MD sent at 4/26/2019 11:28 AM EDT -----  This is high; she should hold it for next 2 days, and follow up as per Dr. Kellen Chatman, neurology.  BJF

## 2019-05-14 RX ORDER — LEVETIRACETAM 500 MG/1
TABLET ORAL
Qty: 120 TAB | Refills: 0 | Status: SHIPPED | OUTPATIENT
Start: 2019-05-14 | End: 2019-06-14 | Stop reason: SDUPTHER

## 2019-05-15 ENCOUNTER — OFFICE VISIT (OUTPATIENT)
Dept: NEUROLOGY | Age: 69
End: 2019-05-15

## 2019-05-15 VITALS
WEIGHT: 208.8 LBS | HEIGHT: 62 IN | BODY MASS INDEX: 38.42 KG/M2 | OXYGEN SATURATION: 98 % | HEART RATE: 85 BPM | DIASTOLIC BLOOD PRESSURE: 86 MMHG | SYSTOLIC BLOOD PRESSURE: 132 MMHG

## 2019-05-15 DIAGNOSIS — R56.1 POST-TRAUMATIC SEIZURES (HCC): Primary | ICD-10-CM

## 2019-05-15 DIAGNOSIS — E78.2 MIXED HYPERLIPIDEMIA: ICD-10-CM

## 2019-05-15 DIAGNOSIS — Z86.73 HISTORY OF CVA (CEREBROVASCULAR ACCIDENT): ICD-10-CM

## 2019-05-15 DIAGNOSIS — I65.23 BILATERAL CAROTID ARTERY STENOSIS: ICD-10-CM

## 2019-05-15 RX ORDER — LISINOPRIL 10 MG/1
TABLET ORAL DAILY
COMMUNITY
End: 2019-06-14

## 2019-05-15 NOTE — PATIENT INSTRUCTIONS
High Cholesterol: Care Instructions  Your Care Instructions    Cholesterol is a type of fat in your blood. It is needed for many body functions, such as making new cells. Cholesterol is made by your body. It also comes from food you eat. High cholesterol means that you have too much of the fat in your blood. This raises your risk of a heart attack and stroke. LDL and HDL are part of your total cholesterol. LDL is the \"bad\" cholesterol. High LDL can raise your risk for heart disease, heart attack, and stroke. HDL is the \"good\" cholesterol. It helps clear bad cholesterol from the body. High HDL is linked with a lower risk of heart disease, heart attack, and stroke. Your cholesterol levels help your doctor find out your risk for having a heart attack or stroke. You and your doctor can talk about whether you need to lower your risk and what treatment is best for you. A heart-healthy lifestyle along with medicines can help lower your cholesterol and your risk. The way you choose to lower your risk will depend on how high your risk is for heart attack and stroke. It will also depend on how you feel about taking medicines. Follow-up care is a key part of your treatment and safety. Be sure to make and go to all appointments, and call your doctor if you are having problems. It's also a good idea to know your test results and keep a list of the medicines you take. How can you care for yourself at home? · Eat a variety of foods every day. Good choices include fruits, vegetables, whole grains (like oatmeal), dried beans and peas, nuts and seeds, soy products (like tofu), and fat-free or low-fat dairy products. · Replace butter, margarine, and hydrogenated or partially hydrogenated oils with olive and canola oils. (Canola oil margarine without trans fat is fine.)  · Replace red meat with fish, poultry, and soy protein (like tofu). · Limit processed and packaged foods like chips, crackers, and cookies.   · Bake, broil, or steam foods. Don't bearden them. · Be physically active. Get at least 30 minutes of exercise on most days of the week. Walking is a good choice. You also may want to do other activities, such as running, swimming, cycling, or playing tennis or team sports. · Stay at a healthy weight or lose weight by making the changes in eating and physical activity listed above. Losing just a small amount of weight, even 5 to 10 pounds, can reduce your risk for having a heart attack or stroke. · Do not smoke. When should you call for help? Watch closely for changes in your health, and be sure to contact your doctor if:    · You need help making lifestyle changes.     · You have questions about your medicine. Where can you learn more? Go to http://lanre-jarocho.info/. Enter N020 in the search box to learn more about \"High Cholesterol: Care Instructions. \"  Current as of: July 22, 2018  Content Version: 11.9  © 7904-1478 [employer]. Care instructions adapted under license by [employer] (which disclaims liability or warranty for this information). If you have questions about a medical condition or this instruction, always ask your healthcare professional. [place] [address] any warranty or liability for your use of this information. Learning About How to Prevent a Stroke  What is a stroke? A stroke occurs when a blood vessel in the brain bursts or is blocked by a blood clot. Without blood and the oxygen it carries, part of the brain starts to die. The part of the body controlled by the damaged area of the brain can't work properly. But there are many things you can do to help lower your stroke risk. What increases your risk for stroke? A risk factor is anything that makes you more likely to have a particular health problem.   Risk factors for stroke that you can treat or change include:  · Health problems like high blood pressure, atrial fibrillation, diabetes, and high cholesterol. · Smoking. · Heavy use of alcohol. · Being overweight. · Not getting enough physical activity. Risk factors you can't change include:  · Age. The risk of stroke goes up as you get older. · Race.  Americans, Native Americans, and [place] Natives have a higher risk than those of other races. · Being female. Women have a higher risk of stroke than men. · Family history of stroke. Your doctor can help you know your risk. Then you and your can doctor talk about whether you need to lower it. What can you do to prevent a stroke? · Treat any health problems you have that raise your risk. · Adopt a heart-healthy lifestyle:  ? Don't smoke. If you need help quitting, talk to your doctor about stop-smoking programs and medicines. These can increase your chances of quitting for good. ? Limit alcohol to 2 drinks a day for men and 1 drink a day for women. ? Stay at a healthy weight. Lose weight if you need to.  ? If your doctor recommends it, get more exercise. Walking is a good choice. Bit by bit, increase the amount you walk every day. Try for at least 30 minutes on most days of the week. ? Eat heart-healthy foods. These include fruits, vegetables, high-fiber foods, and fish. Choose foods that are low in sodium, saturated fat, and trans fat. · Decide with your doctor whether you will also take medicines to help lower your risk. For example, you and your doctor may decide you will take a medicine that prevents blood clots. What are the symptoms of a stroke? The brain damage from a stroke starts within minutes. That's why it's so important to know the symptoms of stroke and to act fast. Quick treatment can help limit damage to the brain so that you have fewer problems after the stroke. FAST is a simple way to remember the main symptoms of stroke. Recognizing these symptoms helps you know when to call for medical help. FAST stands for:  · Face drooping.   · Arm weakness. · Speech difficulty. · Time to call 911. Follow-up care is a key part of your treatment and safety. Be sure to make and go to all appointments, and call your doctor if you are having problems. It's also a good idea to know your test results and keep a list of the medicines you take. Where can you learn more? Go to http://lanre-jarocho.info/. Enter G757 in the search box to learn more about \"Learning About How to Prevent a Stroke. \"  Current as of: September 26, 2018  Content Version: 11.9  © 7336-4913 [employer]. Care instructions adapted under license by [employer] (which disclaims liability or warranty for this information). If you have questions about a medical condition or this instruction, always ask your healthcare professional. [address] any warranty or liability for your use of this information. Learning About How to Prevent Another Stroke  What can you do to prevent another stroke? After a stroke, people feel lots of different emotions. Some people are worried that they could have another stroke. Or they may feel overwhelmed by how much there is to learn and do. Some people feel sad or depressed. No matter what emotions you are feeling, you can give yourself some control and peace of mind by making a plan to lower your risk of having another stroke. Take your medicines  You'll need to take medicines to help prevent another stroke. Be sure to take your medicines exactly as prescribed. And don't stop taking them unless your doctor tells you to. If you stop taking your medicines, you can increase your risk of having another stroke. Some of the medicines your doctor may prescribe include:  · Aspirin or some other blood thinner to prevent blood clots. · Statins to lower cholesterol. · Blood pressure medicines to lower blood pressure.   Manage other health problems  You can help lower your chance of having another stroke by managing certain other health problems. Problems that increase your risk of having another stroke include:  · High blood pressure. · High cholesterol. · Atrial fibrillation. · Diabetes. If you have any of these health problems, you can manage them with healthy lifestyle changes along with medicine. Adopt a healthy lifestyle  · Do not smoke or allow others to smoke around you. If you need help quitting, talk to your doctor about stop-smoking programs and medicines. These can increase your chances of quitting for good. Smoking makes a stroke more likely. · Limit alcohol to 2 drinks a day for men and 1 drink a day for women. · Lose weight if you need to. Controlling your weight will help you keep your heart and body healthy. · Be active. Ask your doctor what type and level of activity is safe for you. · Eat heart-healthy foods, like fruits, vegetables, and high-fiber foods. It's also important to:  · Get a flu shot every year. · Ask for help if you think you are depressed. Do stroke rehab  Taking part in a stroke rehabilitation (rehab) program will help you to regain skills you lost or make the most of your abilities after a stroke. It also helps you take steps to prevent another stroke. Your rehab team will give you education and support to help you build new, healthy habits. You'll learn how to manage any other health problems that you might have. Jm Ruiz also learn how to exercise safely, eat a healthy diet, and quit smoking if you smoke. Jm Ruiz work with your team to decide what lifestyle choices are best for you. If your doctor hasn't already suggested it, ask him or her if stroke rehab is right for you. Know stroke symptoms  Make sure you know the symptoms of stroke. FAST is a simple way to remember. Recognizing these symptoms helps you know when to call for medical help. FAST stands for:  · Face drooping. · Arm weakness. · Speech difficulty. · Time to call 911.   Follow-up care is a key part of your treatment and safety. Be sure to make and go to all appointments, and call your doctor if you are having problems. It's also a good idea to know your test results and keep a list of the medicines you take. Where can you learn more? Go to http://lanre-jarocho.info/. Enter H108 in the search box to learn more about \"Learning About How to Prevent Another Stroke. \"  Current as of: September 26, 2018  Content Version: 11.9  © 9667-8503 [employer]. Care instructions adapted under license by [employer] (which disclaims liability or warranty for this information). If you have questions about a medical condition or this instruction, always ask your healthcare professional. [address] any warranty or liability for your use of this information. Seizure: Care Instructions  Your Care Instructions    Seizures are caused by abnormal patterns of electrical signals in the brain. They are different for each person. Seizures can affect movement, speech, vision, or awareness. Some people have only slight shaking of a hand and do not pass out. Other people may pass out and have violent shaking of the whole body. Some people appear to stare into space. They are awake, but they can't respond normally. Later, they may not remember what happened. You may need tests to identify the type and cause of the seizures. A seizure may occur only once, or you may have them more than one time. Taking medicines as directed and following up with your doctor may help keep you from having more seizures. The doctor has checked you carefully, but problems can develop later. If you notice any problems or new symptoms, get medical treatment right away. Follow-up care is a key part of your treatment and safety. Be sure to make and go to all appointments, and call your doctor if you are having problems.  It's also a good idea to know your test results and keep a list of the medicines you take. How can you care for yourself at home? · Be safe with medicines. Take your medicines exactly as prescribed. Call your doctor if you think you are having a problem with your medicine. · Do not do any activity that could be dangerous to you or others until your doctor says it is safe to do so. For example, do not drive a car, operate machinery, swim, or climb ladders. · Be sure that anyone treating you for any health problem knows that you have had a seizure and what medicines you are taking for it. · Identify and avoid things that may make you more likely to have a seizure. These may include lack of sleep, alcohol or drug use, stress, or not eating. · Make sure you go to your follow-up appointment. When should you call for help? Call 911 anytime you think you may need emergency care. For example, call if:    · You have another seizure.     · You have more than one seizure in 24 hours.     · You have new symptoms, such as trouble walking, speaking, or thinking clearly.    Call your doctor now or seek immediate medical care if:    · You are not acting normally.    Watch closely for changes in your health, and be sure to contact your doctor if you have any problems. Where can you learn more? Go to http://lanre-jarocho.info/. Enter F862 in the search box to learn more about \"Seizure: Care Instructions. \"  Current as of: Loyda 3, 2018  Content Version: 11.9  © 7224-0675 [employer]. Care instructions adapted under license by [employer] (which disclaims liability or warranty for this information). If you have questions about a medical condition or this instruction, always ask your healthcare professional. [place] [address] any warranty or liability for your use of this information.
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
## 2019-05-17 LAB — LEVETIRACETAM SERPL-MCNC: 60.5 UG/ML (ref 10–40)

## 2019-05-17 NOTE — PROGRESS NOTES
Patient informed the levetiracetam level was still too high at 60.5. Advised to spread Levetiracetam dose to 500 mg BID. Recheck levels in 2 weeks.

## 2019-06-11 RX ORDER — AMLODIPINE BESYLATE 5 MG/1
TABLET ORAL
Qty: 30 TAB | Refills: 0 | Status: SHIPPED | OUTPATIENT
Start: 2019-06-11 | End: 2019-06-14 | Stop reason: SDUPTHER

## 2019-06-11 RX ORDER — SPIRONOLACTONE 25 MG/1
TABLET ORAL
Qty: 30 TAB | Refills: 0 | Status: SHIPPED | OUTPATIENT
Start: 2019-06-11 | End: 2019-06-14 | Stop reason: SDUPTHER

## 2019-06-11 RX ORDER — ROSUVASTATIN CALCIUM 10 MG/1
TABLET, COATED ORAL
Qty: 30 TAB | Refills: 0 | Status: SHIPPED | OUTPATIENT
Start: 2019-06-11 | End: 2019-06-14 | Stop reason: SDUPTHER

## 2019-06-14 ENCOUNTER — OFFICE VISIT (OUTPATIENT)
Dept: INTERNAL MEDICINE CLINIC | Age: 69
End: 2019-06-14

## 2019-06-14 VITALS
TEMPERATURE: 97.7 F | SYSTOLIC BLOOD PRESSURE: 108 MMHG | HEART RATE: 99 BPM | RESPIRATION RATE: 16 BRPM | DIASTOLIC BLOOD PRESSURE: 70 MMHG | OXYGEN SATURATION: 98 % | BODY MASS INDEX: 39.53 KG/M2 | HEIGHT: 62 IN | WEIGHT: 214.8 LBS

## 2019-06-14 DIAGNOSIS — Z12.39 SCREENING FOR BREAST CANCER: ICD-10-CM

## 2019-06-14 DIAGNOSIS — I10 ESSENTIAL HYPERTENSION: Primary | ICD-10-CM

## 2019-06-14 DIAGNOSIS — M54.9 BACK PAIN, UNSPECIFIED BACK LOCATION, UNSPECIFIED BACK PAIN LATERALITY, UNSPECIFIED CHRONICITY: ICD-10-CM

## 2019-06-14 RX ORDER — LOSARTAN POTASSIUM 100 MG/1
100 TABLET ORAL DAILY
Qty: 30 TAB | Refills: 5 | Status: SHIPPED | OUTPATIENT
Start: 2019-06-14 | End: 2020-01-12

## 2019-06-14 RX ORDER — MELOXICAM 15 MG/1
15 TABLET ORAL DAILY
Qty: 30 TAB | Refills: 6 | Status: SHIPPED | OUTPATIENT
Start: 2019-06-14 | End: 2019-11-23

## 2019-06-14 RX ORDER — ROSUVASTATIN CALCIUM 10 MG/1
10 TABLET, COATED ORAL DAILY
Qty: 30 TAB | Refills: 5 | Status: SHIPPED | OUTPATIENT
Start: 2019-06-14 | End: 2020-01-12

## 2019-06-14 RX ORDER — LEVETIRACETAM 500 MG/1
1000 TABLET ORAL 2 TIMES DAILY
Qty: 120 TAB | Refills: 5 | Status: SHIPPED | OUTPATIENT
Start: 2019-06-14 | End: 2019-11-20 | Stop reason: SDUPTHER

## 2019-06-14 RX ORDER — LATANOPROST 50 UG/ML
1 SOLUTION/ DROPS OPHTHALMIC
Qty: 1 BOTTLE | Refills: 5 | Status: SHIPPED | OUTPATIENT
Start: 2019-06-14 | End: 2021-09-29 | Stop reason: SDUPTHER

## 2019-06-14 RX ORDER — LISINOPRIL 10 MG/1
TABLET ORAL DAILY
Refills: 5 | Status: CANCELLED | OUTPATIENT
Start: 2019-06-14

## 2019-06-14 RX ORDER — COLCHICINE 0.6 MG/1
0.6 CAPSULE ORAL DAILY
Qty: 30 CAP | Refills: 5 | Status: SHIPPED | OUTPATIENT
Start: 2019-06-14 | End: 2019-12-09 | Stop reason: SDUPTHER

## 2019-06-14 RX ORDER — SPIRONOLACTONE 25 MG/1
25 TABLET ORAL DAILY
Qty: 30 TAB | Refills: 5 | Status: SHIPPED | OUTPATIENT
Start: 2019-06-14 | End: 2020-01-12

## 2019-06-14 RX ORDER — AMLODIPINE BESYLATE 5 MG/1
5 TABLET ORAL DAILY
Qty: 30 TAB | Refills: 5 | Status: SHIPPED | OUTPATIENT
Start: 2019-06-14 | End: 2020-03-16
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
## 2019-07-02 ENCOUNTER — TELEPHONE (OUTPATIENT)
Dept: INTERNAL MEDICINE CLINIC | Age: 69
End: 2019-07-02

## 2019-07-02 ENCOUNTER — OFFICE VISIT (OUTPATIENT)
Dept: CARDIOLOGY CLINIC | Age: 69
End: 2019-07-02

## 2019-07-02 VITALS
RESPIRATION RATE: 20 BRPM | HEIGHT: 62 IN | SYSTOLIC BLOOD PRESSURE: 95 MMHG | WEIGHT: 215 LBS | BODY MASS INDEX: 39.56 KG/M2 | DIASTOLIC BLOOD PRESSURE: 70 MMHG | HEART RATE: 94 BPM | OXYGEN SATURATION: 96 %

## 2019-07-02 DIAGNOSIS — I87.2 CHRONIC VENOUS INSUFFICIENCY: ICD-10-CM

## 2019-07-02 DIAGNOSIS — I65.23 BILATERAL CAROTID ARTERY STENOSIS: ICD-10-CM

## 2019-07-02 DIAGNOSIS — G47.62 NOCTURNAL LEG CRAMPS: Primary | ICD-10-CM

## 2019-07-02 DIAGNOSIS — M54.5 LOW BACK PAIN, UNSPECIFIED BACK PAIN LATERALITY, UNSPECIFIED CHRONICITY, WITH SCIATICA PRESENCE UNSPECIFIED: Primary | ICD-10-CM

## 2019-07-02 DIAGNOSIS — R60.0 BILATERAL LEG EDEMA: ICD-10-CM

## 2019-07-02 DIAGNOSIS — R09.89 RIGHT CAROTID BRUIT: ICD-10-CM

## 2019-07-02 DIAGNOSIS — E66.01 OBESITY, MORBID (HCC): ICD-10-CM

## 2019-07-02 DIAGNOSIS — I10 HYPERTENSION, UNSPECIFIED TYPE: ICD-10-CM

## 2019-07-02 DIAGNOSIS — R55 SYNCOPE AND COLLAPSE: ICD-10-CM

## 2019-07-02 RX ORDER — METHOCARBAMOL 750 MG/1
750 TABLET, FILM COATED ORAL
COMMUNITY
Start: 2019-03-20 | End: 2019-07-03 | Stop reason: SDUPTHER

## 2019-07-02 NOTE — PROGRESS NOTES
Chief Complaint   Patient presents with    Follow-up    Hypertension     1. Have you been to the ER, urgent care clinic since your last visit? Hospitalized since your last visit? No    2. Have you seen or consulted any other health care providers outside of the [address] [place] [address] since your last visit? Include any pap smears or colon screening. Yes, [facility], (R) Hip Pain.
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
## 2019-07-02 NOTE — TELEPHONE ENCOUNTER
#[phone] or #[phone] pt is at [facility] [place] and Dr. Shahid Lemus wants pt to have an MRI of her back. Please call pt          Pt calling again today stating she also did not get her muscle relaxer that she was to have gotten last week. Can you please call pt pertaining to this as well. Thanks.

## 2019-07-03 RX ORDER — METHOCARBAMOL 750 MG/1
750 TABLET, FILM COATED ORAL 4 TIMES DAILY
Qty: 60 TAB | Refills: 1 | Status: SHIPPED | OUTPATIENT
Start: 2019-07-03 | End: 2020-04-10 | Stop reason: SDUPTHER

## 2019-07-03 NOTE — TELEPHONE ENCOUNTER
2 pt identifiers verified; pt notified that robaxin has been ordered.  Pt also want to inform Dr Andrew Dominguez that she did she Dr Steven Barrera on yesterday (note in system) who told pt that she should get an MRI and that her PCP must order the test.

## 2019-07-10 NOTE — TELEPHONE ENCOUNTER
Identified patient 2 identifiers verified.   Patient aware of new order for MRI and given contact number to [facility] scheduling also and Robaxin was sent to [facility].

## 2019-07-16 ENCOUNTER — HOSPITAL ENCOUNTER (OUTPATIENT)
Dept: MRI IMAGING | Age: 69
Discharge: HOME | End: 2019-07-16
Attending: INTERNAL MEDICINE
Payer: COMMERCIAL

## 2019-07-16 DIAGNOSIS — M54.5 LOW BACK PAIN, UNSPECIFIED BACK PAIN LATERALITY, UNSPECIFIED CHRONICITY, WITH SCIATICA PRESENCE UNSPECIFIED: ICD-10-CM

## 2019-07-16 PROCEDURE — 72148 MRI LUMBAR SPINE W/O DYE: CPT

## 2019-07-19 NOTE — PROGRESS NOTES
She has some spinal stenosis, which may explain symptoms. Let's refer to neurosurgery, Dr. Bib Armendariz.  BJF

## 2019-07-25 ENCOUNTER — TELEPHONE (OUTPATIENT)
Dept: INTERNAL MEDICINE CLINIC | Age: 69
End: 2019-07-25

## 2019-07-30 ENCOUNTER — TELEPHONE (OUTPATIENT)
Dept: INTERNAL MEDICINE CLINIC | Age: 69
End: 2019-07-30

## 2019-08-09 ENCOUNTER — HOSPITAL ENCOUNTER (OUTPATIENT)
Dept: MAMMOGRAPHY | Age: 69
Discharge: HOME | End: 2019-08-09
Attending: INTERNAL MEDICINE
Payer: COMMERCIAL

## 2019-08-09 DIAGNOSIS — Z12.39 SCREENING FOR BREAST CANCER: ICD-10-CM

## 2019-08-09 PROCEDURE — 77067 SCR MAMMO BI INCL CAD: CPT

## 2019-11-18 ENCOUNTER — TELEPHONE (OUTPATIENT)
Dept: INTERNAL MEDICINE CLINIC | Age: 69
End: 2019-11-18

## 2019-11-18 NOTE — TELEPHONE ENCOUNTER
[place], [address] [facility]             General Message/Vendor Calls     Caller's first and last name: Kevin David       Reason for call: name and number of specialist       Callback required yes/no and why: yes       Best contact number(s):[phone]       Details to clarify the request: Patient is requesting the name and number of the back specialist you recommended.        Tracy Mtezger     Copy/Paste   Walker [place]

## 2019-11-19 NOTE — TELEPHONE ENCOUNTER
I called pt, 2 pt identifiers verified, pt given info:             [facility]        [address] [facility] [id number]           Phone:  Fax: [phone]

## 2019-11-20 ENCOUNTER — OFFICE VISIT (OUTPATIENT)
Dept: NEUROLOGY | Age: 69
End: 2019-11-20

## 2019-11-20 VITALS
WEIGHT: 214 LBS | HEIGHT: 62 IN | DIASTOLIC BLOOD PRESSURE: 80 MMHG | OXYGEN SATURATION: 98 % | BODY MASS INDEX: 39.38 KG/M2 | HEART RATE: 80 BPM | SYSTOLIC BLOOD PRESSURE: 128 MMHG

## 2019-11-20 DIAGNOSIS — R56.1 POST-TRAUMATIC SEIZURES (HCC): Primary | ICD-10-CM

## 2019-11-20 DIAGNOSIS — I65.23 BILATERAL CAROTID ARTERY STENOSIS: ICD-10-CM

## 2019-11-20 DIAGNOSIS — Z86.73 HISTORY OF CVA (CEREBROVASCULAR ACCIDENT): ICD-10-CM

## 2019-11-20 DIAGNOSIS — E78.2 MIXED HYPERLIPIDEMIA: ICD-10-CM

## 2019-11-20 RX ORDER — LISINOPRIL 5 MG/1
TABLET ORAL DAILY
COMMUNITY

## 2019-11-20 RX ORDER — LEVETIRACETAM 500 MG/1
500 TABLET ORAL 2 TIMES DAILY
Qty: 60 TAB | Refills: 11 | Status: SHIPPED | OUTPATIENT
Start: 2019-11-20 | End: 2020-01-14 | Stop reason: SDUPTHER

## 2019-11-20 NOTE — LETTER
11/26/19 Patient: Moni Salamanca YOB: 1950 Date of Visit: 11/20/2019 Jenny Wade MD 
[address] [address] VIA In Basket Dear Jenny Wade MD, Thank you for referring Ms. Melnaie Amor to [facility] for evaluation. My notes for this consultation are attached. If you have questions, please do not hesitate to call me. I look forward to following your patient along with you. Sincerely, Reji Avery MD

## 2019-11-20 NOTE — PATIENT INSTRUCTIONS
A Healthy Lifestyle: Care Instructions Your Care Instructions A healthy lifestyle can help you feel good, stay at a healthy weight, and have plenty of energy for both work and play. A healthy lifestyle is something you can share with your whole family. A healthy lifestyle also can lower your risk for serious health problems, such as high blood pressure, heart disease, and diabetes. You can follow a few steps listed below to improve your health and the health of your family. Follow-up care is a key part of your treatment and safety. Be sure to make and go to all appointments, and call your doctor if you are having problems. It's also a good idea to know your test results and keep a list of the medicines you take. How can you care for yourself at home? · Do not eat too much sugar, fat, or fast foods. You can still have dessert and treats now and then. The goal is moderation. · Start small to improve your eating habits. Pay attention to portion sizes, drink less juice and soda pop, and eat more fruits and vegetables. ? Eat a healthy amount of food. A 3-ounce serving of meat, for example, is about the size of a deck of cards. Fill the rest of your plate with vegetables and whole grains. ? Limit the amount of soda and sports drinks you have every day. Drink more water when you are thirsty. ? Eat at least 5 servings of fruits and vegetables every day. It may seem like a lot, but it is not hard to reach this goal. A serving or helping is 1 piece of fruit, 1 cup of vegetables, or 2 cups of leafy, raw vegetables. Have an apple or some carrot sticks as an afternoon snack instead of a candy bar. Try to have fruits and/or vegetables at every meal. 
· Make exercise part of your daily routine. You may want to start with simple activities, such as walking, bicycling, or slow swimming. Try to be active 30 to 60 minutes every day.  You do not need to do all 30 to 60 minutes all at once. For example, you can exercise 3 times a day for 10 or 20 minutes. Moderate exercise is safe for most people, but it is always a good idea to talk to your doctor before starting an exercise program. 
· Keep moving. [place] the lawn, work in the garden, or [place]. Take the stairs instead of the elevator at work. · If you smoke, quit. People who smoke have an increased risk for heart attack, stroke, cancer, and other lung illnesses. Quitting is hard, but there are ways to boost your chance of quitting tobacco for good. ? Use nicotine gum, patches, or lozenges. ? Ask your doctor about stop-smoking programs and medicines. ? Keep trying. In addition to reducing your risk of diseases in the future, you will notice some benefits soon after you stop using tobacco. If you have shortness of breath or asthma symptoms, they will likely get better within a few weeks after you quit. · Limit how much alcohol you drink. Moderate amounts of alcohol (up to 2 drinks a day for men, 1 drink a day for women) are okay. But drinking too much can lead to liver problems, high blood pressure, and other health problems. Family health If you have a family, there are many things you can do together to improve your health. · Eat meals together as a family as often as possible. · Eat healthy foods. This includes fruits, vegetables, lean meats and dairy, and whole grains. · Include your family in your fitness plan. Most people think of activities such as jogging or tennis as the way to fitness, but there are many ways you and your family can be more active. Anything that makes you breathe hard and gets your heart pumping is exercise. Here are some tips: 
? Walk to do errands or to take your child to school or the bus. 
? Go for a family bike ride after dinner instead of watching TV. Where can you learn more? Go to http://lanre-jarocho.info/. Enter Q086 in the search box to learn more about \"A Healthy Lifestyle: Care Instructions. \" Current as of: May 28, 2019 Content Version: 12.2 © 7444-0812 [employer]. Care instructions adapted under license by [employer] (which disclaims liability or warranty for this information). If you have questions about a medical condition or this instruction, always ask your healthcare professional. [address] any warranty or liability for your use of this information. Learning About How to Prevent a Stroke What is a stroke? A stroke occurs when a blood vessel in the brain bursts or is blocked by a blood clot. Without blood and the oxygen it carries, part of the brain starts to die. The part of the body controlled by the damaged area of the brain can't work properly. But there are many things you can do to help lower your stroke risk. What increases your risk for stroke? A risk factor is anything that makes you more likely to have a particular health problem. Risk factors for stroke that you can treat or change include: 
· Health problems like high blood pressure, atrial fibrillation, diabetes, and high cholesterol. · Smoking. · Heavy use of alcohol. · Being overweight. · Not getting enough physical activity. Risk factors you can't change include: · Age. The risk of stroke goes up as you get older. · Race.  Americans, Native Americans, and [place] Natives have a higher risk than those of other races. · Being female. Women have a higher risk of stroke than men. · Family history of stroke. Your doctor can help you know your risk. Then you and your can doctor talk about whether you need to lower it. What can you do to prevent a stroke? · Treat any health problems you have that raise your risk. · Adopt a heart-healthy lifestyle: ? Don't smoke.  If you need help quitting, talk to your doctor about stop-smoking programs and medicines. These can increase your chances of quitting for good. ? Limit alcohol to 2 drinks a day for men and 1 drink a day for women. ? Stay at a healthy weight. Lose weight if you need to. 
? If your doctor recommends it, get more exercise. Walking is a good choice. Bit by bit, increase the amount you walk every day. Try for at least 30 minutes on most days of the week. ? Eat heart-healthy foods. These include fruits, vegetables, high-fiber foods, and fish. Choose foods that are low in sodium, saturated fat, and trans fat. · Decide with your doctor whether you will also take medicines to help lower your risk. For example, you and your doctor may decide you will take a medicine that prevents blood clots. What are the symptoms of a stroke? The brain damage from a stroke starts within minutes. That's why it's so important to know the symptoms of stroke and to act fast. Quick treatment can help limit damage to the brain so that you have fewer problems after the stroke. FAST is a simple way to remember the main symptoms of stroke. Recognizing these symptoms helps you know when to call for medical help. FAST stands for: 
· Face drooping. · Arm weakness. · Speech difficulty. · Time to call 911. Follow-up care is a key part of your treatment and safety. Be sure to make and go to all appointments, and call your doctor if you are having problems. It's also a good idea to know your test results and keep a list of the medicines you take. Where can you learn more? Go to http://lanre-jarocho.info/. Enter G757 in the search box to learn more about \"Learning About How to Prevent a Stroke. \" Current as of: September 26, 2018 Content Version: 12.2 © 5045-2339 [employer]. Care instructions adapted under license by [employer] (which disclaims liability or warranty for this information).  If you have questions about a medical condition or this instruction, always ask your healthcare professional. [place] [address] any warranty or liability for your use of this information. Learning About How to Prevent Another Stroke What can you do to prevent another stroke? After a stroke, people feel lots of different emotions. Some people are worried that they could have another stroke. Or they may feel overwhelmed by how much there is to learn and do. Some people feel sad or depressed. No matter what emotions you are feeling, you can give yourself some control and peace of mind by making a plan to lower your risk of having another stroke. Take your medicines You'll need to take medicines to help prevent another stroke. Be sure to take your medicines exactly as prescribed. And don't stop taking them unless your doctor tells you to. If you stop taking your medicines, you can increase your risk of having another stroke. Some of the medicines your doctor may prescribe include: · Aspirin or some other blood thinner to prevent blood clots. · Statins and other medicines to lower cholesterol. · Blood pressure medicines to lower blood pressure. Manage other health problems You can help lower your chance of having another stroke by managing certain other health problems. Problems that increase your risk of having another stroke include: · High blood pressure. · High cholesterol. · Atrial fibrillation. · Diabetes. If you have any of these health problems, you can manage them with healthy lifestyle changes along with medicine. Adopt a healthy lifestyle · Do not smoke or allow others to smoke around you. If you need help quitting, talk to your doctor about stop-smoking programs and medicines. These can increase your chances of quitting for good. Smoking makes a stroke more likely. · Limit alcohol to 2 drinks a day for men and 1 drink a day for women. · Lose weight if you need to.  Controlling your weight will help you keep your heart and body healthy. · Be active. Ask your doctor what type and level of activity is safe for you. · Eat heart-healthy foods, like fruits, vegetables, and high-fiber foods. It's also important to: · Get a flu shot every year. · Ask for help if you think you are depressed. Do stroke rehab Taking part in a stroke rehabilitation (rehab) program will help you to regain skills you lost or make the most of your abilities after a stroke. It also helps you take steps to prevent another stroke. Your rehab team will give you education and support to help you build new, healthy habits. You'll learn how to manage any other health problems that you might have. Alena Osler also learn how to exercise safely, eat a healthy diet, and quit smoking if you smoke. Alena Osler work with your team to decide what lifestyle choices are best for you. If your doctor hasn't already suggested it, ask him or her if stroke rehab is right for you. Know stroke symptoms Make sure you know the symptoms of stroke. FAST is a simple way to remember. Recognizing these symptoms helps you know when to call for medical help. FAST stands for: 
· Face drooping. · Arm weakness. · Speech difficulty. · Time to call 911. Follow-up care is a key part of your treatment and safety. Be sure to make and go to all appointments, and call your doctor if you are having problems. It's also a good idea to know your test results and keep a list of the medicines you take. Where can you learn more? Go to http://lanre-jarocho.info/. Enter K647 in the search box to learn more about \"Learning About How to Prevent Another Stroke. \" Current as of: September 26, 2018 Content Version: 12.2 © 4850-7981 [employer]. Care instructions adapted under license by [employer] (which disclaims liability or warranty for this information).  If you have questions about a medical condition or this instruction, always ask your healthcare professional. [address] any warranty or liability for your use of this information. High Cholesterol: Care Instructions Your Care Instructions Cholesterol is a type of fat in your blood. It is needed for many body functions, such as making new cells. Cholesterol is made by your body. It also comes from food you eat. High cholesterol means that you have too much of the fat in your blood. This raises your risk of a heart attack and stroke. LDL and HDL are part of your total cholesterol. LDL is the \"bad\" cholesterol. High LDL can raise your risk for heart disease, heart attack, and stroke. HDL is the \"good\" cholesterol. It helps clear bad cholesterol from the body. High HDL is linked with a lower risk of heart disease, heart attack, and stroke. Your cholesterol levels help your doctor find out your risk for having a heart attack or stroke. You and your doctor can talk about whether you need to lower your risk and what treatment is best for you. A heart-healthy lifestyle along with medicines can help lower your cholesterol and your risk. The way you choose to lower your risk will depend on how high your risk is for heart attack and stroke. It will also depend on how you feel about taking medicines. Follow-up care is a key part of your treatment and safety. Be sure to make and go to all appointments, and call your doctor if you are having problems. It's also a good idea to know your test results and keep a list of the medicines you take. How can you care for yourself at home? · Eat a variety of foods every day. Good choices include fruits, vegetables, whole grains (like oatmeal), dried beans and peas, nuts and seeds, soy products (like tofu), and fat-free or low-fat dairy products. · Replace butter, margarine, and hydrogenated or partially hydrogenated oils with olive and canola oils.  (Canola oil margarine without trans fat is fine.) · Replace red meat with fish, poultry, and soy protein (like tofu). · Limit processed and packaged foods like chips, crackers, and cookies. · Bake, broil, or steam foods. Don't bearden them. · Be physically active. Get at least 30 minutes of exercise on most days of the week. Walking is a good choice. You also may want to do other activities, such as running, swimming, cycling, or playing tennis or team sports. · Stay at a healthy weight or lose weight by making the changes in eating and physical activity listed above. Losing just a small amount of weight, even 5 to 10 pounds, can reduce your risk for having a heart attack or stroke. · Do not smoke. When should you call for help? Watch closely for changes in your health, and be sure to contact your doctor if: 
  · You need help making lifestyle changes.  
  · You have questions about your medicine. Where can you learn more? Go to http://lanre-jarocho.info/. Enter V782 in the search box to learn more about \"High Cholesterol: Care Instructions. \" Current as of: April 9, 2019 Content Version: 12.2 © 7707-2421 [employer]. Care instructions adapted under license by [employer] (which disclaims liability or warranty for this information). If you have questions about a medical condition or this instruction, always ask your healthcare professional. [address] any warranty or liability for your use of this information. Epilepsy: Care Instructions Your Care Instructions Epilepsy is a common condition that causes repeated seizures. The seizures are caused by bursts of electrical activity in the brain that aren't normal. Seizures may cause problems with muscle control, movement, speech, vision, or awareness. They can be scary. Epilepsy affects each person differently. Some people have only a few seizures. Others get them more often.  If you know what triggers a seizure, you may be able to avoid having one. You can take medicines to control and reduce seizures. You and your doctor will need to find the right combination, schedule, and dose of medicine. This may take time and careful changes. Seizures may get worse and happen more often over time. Follow-up care is a key part of your treatment and safety. Be sure to make and go to all appointments, and call your doctor if you are having problems. It's also a good idea to know your test results and keep a list of the medicines you take. How can you care for yourself at home? · Be safe with medicines. Take your medicines exactly as prescribed. Call your doctor if you think you are having a problem with your medicine. · Make a treatment plan with your doctor. Be sure to follow your plan. · Try to identify and avoid things that may make you more likely to have a seizure. These may include: ? Not getting enough sleep. ? Using drugs or alcohol. ? Being emotionally stressed. ? Skipping meals. · Keep a record of any seizures you have. Note the date, time of day, and any details about the seizure that you can remember. Your doctor can use this information to plan or adjust your medicine or other treatment. · Be sure that any doctor treating you for another condition knows that you have epilepsy. Each doctor should know what medicines you are taking, if any. · Wear a medical ID bracelet. You can buy this at most [employer]. If you have a seizure that leaves you unconscious or unable to speak for yourself, this bracelet will let those who are treating you know that you have epilepsy. · Talk to your doctor about whether it is safe for you to do certain activities, such as drive or swim. When should you call for help? Call 911 anytime you think you may need emergency care. For example, call if: 
  · A seizure does not stop as it normally does.  
  · You have new symptoms such as: ? Numbness, tingling, or weakness on one side of your body or face. ? Vision changes. ? Trouble speaking or thinking clearly.  
 Call your doctor now or seek immediate medical care if: 
  · You have a fever.  
  · You have a severe headache.  
 Watch closely for changes in your health, and be sure to contact your doctor if: 
  · The normal pattern or features of your seizures change. Where can you learn more? Go to http://lanre-jarocho.info/. Misbah Deras in the search box to learn more about \"Epilepsy: Care Instructions. \" Current as of: March 28, 2019 Content Version: 12.2 © 4806-8538 [employer]. Care instructions adapted under license by [employer] (which disclaims liability or warranty for this information). If you have questions about a medical condition or this instruction, always ask your healthcare professional. [address] any warranty or liability for your use of this information.
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
## 2019-11-23 ENCOUNTER — APPOINTMENT (OUTPATIENT)
Dept: GENERAL RADIOLOGY | Age: 69
End: 2019-11-23
Attending: PHYSICIAN ASSISTANT
Payer: COMMERCIAL

## 2019-11-23 ENCOUNTER — HOSPITAL ENCOUNTER (EMERGENCY)
Age: 69
Discharge: HOME | End: 2019-11-23
Attending: EMERGENCY MEDICINE
Payer: COMMERCIAL

## 2019-11-23 ENCOUNTER — APPOINTMENT (OUTPATIENT)
Dept: CT IMAGING | Age: 69
End: 2019-11-23
Attending: PHYSICIAN ASSISTANT
Payer: COMMERCIAL

## 2019-11-23 VITALS
BODY MASS INDEX: 39.38 KG/M2 | TEMPERATURE: 97.6 F | WEIGHT: 214 LBS | SYSTOLIC BLOOD PRESSURE: 129 MMHG | HEART RATE: 80 BPM | OXYGEN SATURATION: 97 % | HEIGHT: 62 IN | DIASTOLIC BLOOD PRESSURE: 84 MMHG | RESPIRATION RATE: 16 BRPM

## 2019-11-23 DIAGNOSIS — Z86.73 HISTORY OF STROKE: ICD-10-CM

## 2019-11-23 DIAGNOSIS — Z82.49 FAMILY HISTORY OF EARLY CAD: ICD-10-CM
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
## 2019-11-23 DIAGNOSIS — R07.89 ANTERIOR CHEST WALL PAIN: ICD-10-CM

## 2019-11-23 DIAGNOSIS — M54.9 BACK PAIN, UNSPECIFIED BACK LOCATION, UNSPECIFIED BACK PAIN LATERALITY, UNSPECIFIED CHRONICITY: ICD-10-CM

## 2019-11-23 DIAGNOSIS — M54.2 NECK PAIN: ICD-10-CM

## 2019-11-23 DIAGNOSIS — M54.50 ACUTE RIGHT-SIDED LOW BACK PAIN, UNSPECIFIED WHETHER SCIATICA PRESENT: ICD-10-CM

## 2019-11-23 PROCEDURE — 99284 EMERGENCY DEPT VISIT MOD MDM: CPT

## 2019-11-23 PROCEDURE — 72131 CT LUMBAR SPINE W/O DYE: CPT

## 2019-11-23 PROCEDURE — 71045 X-RAY EXAM CHEST 1 VIEW: CPT

## 2019-11-23 PROCEDURE — 74011250637 HC RX REV CODE- 250/637: Performed by: PHYSICIAN ASSISTANT

## 2019-11-23 PROCEDURE — 72125 CT NECK SPINE W/O DYE: CPT

## 2019-11-23 RX ORDER — HYDROCODONE BITARTRATE AND ACETAMINOPHEN 5; 325 MG/1; MG/1
1 TABLET ORAL
Status: COMPLETED | OUTPATIENT
Start: 2019-11-23 | End: 2019-11-23

## 2019-11-23 RX ORDER — LIDOCAINE 50 MG/G
PATCH TOPICAL
Qty: 1 EACH | Refills: 0 | Status: SHIPPED | OUTPATIENT
Start: 2019-11-23 | End: 2021-05-27

## 2019-11-23 RX ORDER — MELOXICAM 15 MG/1
15 TABLET ORAL DAILY
Qty: 30 TAB | Refills: 0 | Status: SHIPPED | OUTPATIENT
Start: 2019-11-23 | End: 2020-04-10 | Stop reason: SDUPTHER

## 2019-11-23 RX ORDER — METHOCARBAMOL 500 MG/1
500 TABLET, FILM COATED ORAL
Status: COMPLETED | OUTPATIENT
Start: 2019-11-23 | End: 2019-11-23

## 2019-11-23 RX ORDER — CYCLOBENZAPRINE HCL 10 MG
10 TABLET ORAL
Qty: 15 TAB | Refills: 0 | Status: SHIPPED | OUTPATIENT
Start: 2019-11-23 | End: 2021-04-05 | Stop reason: DRUGHIGH

## 2019-11-23 RX ADMIN — HYDROCODONE BITARTRATE AND ACETAMINOPHEN 1 TABLET: 5; 325 TABLET ORAL at 16:25

## 2019-11-23 RX ADMIN — METHOCARBAMOL TABLETS 500 MG: 500 TABLET, COATED ORAL at 16:25
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
## 2019-11-23 NOTE — ED NOTES
I have reviewed discharge instructions with the patient. The patient verbalized understanding. Ambulatory out of ED at this time with  to drive home.
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
## 2019-11-25 ENCOUNTER — TELEPHONE (OUTPATIENT)
Dept: INTERNAL MEDICINE CLINIC | Age: 69
End: 2019-11-25
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
## 2019-12-09 ENCOUNTER — OFFICE VISIT (OUTPATIENT)
Dept: INTERNAL MEDICINE CLINIC | Age: 69
End: 2019-12-09

## 2019-12-09 VITALS
SYSTOLIC BLOOD PRESSURE: 143 MMHG | HEART RATE: 85 BPM | RESPIRATION RATE: 16 BRPM | DIASTOLIC BLOOD PRESSURE: 85 MMHG | WEIGHT: 217.2 LBS | OXYGEN SATURATION: 99 % | TEMPERATURE: 98.8 F | HEIGHT: 62 IN | BODY MASS INDEX: 39.97 KG/M2

## 2019-12-09 DIAGNOSIS — M10.9 GOUT, UNSPECIFIED CAUSE, UNSPECIFIED CHRONICITY, UNSPECIFIED SITE: ICD-10-CM

## 2019-12-09 DIAGNOSIS — Z79.899 HIGH RISK MEDICATION USE: ICD-10-CM

## 2019-12-09 DIAGNOSIS — M54.50 LOW BACK PAIN, UNSPECIFIED BACK PAIN LATERALITY, UNSPECIFIED CHRONICITY, UNSPECIFIED WHETHER SCIATICA PRESENT: Primary | ICD-10-CM

## 2019-12-09 DIAGNOSIS — M54.9 BACK PAIN, UNSPECIFIED BACK LOCATION, UNSPECIFIED BACK PAIN LATERALITY, UNSPECIFIED CHRONICITY: ICD-10-CM

## 2019-12-09 DIAGNOSIS — I10 HYPERTENSION, UNSPECIFIED TYPE: ICD-10-CM

## 2019-12-09 RX ORDER — COLCHICINE 0.6 MG/1
0.6 CAPSULE ORAL DAILY
Qty: 30 CAP | Refills: 5 | Status: SHIPPED | OUTPATIENT
Start: 2019-12-09 | End: 2020-07-09

## 2019-12-09 RX ORDER — OXYCODONE AND ACETAMINOPHEN 5; 325 MG/1; MG/1
1 TABLET ORAL
Qty: 15 TAB | Refills: 0 | Status: SHIPPED | OUTPATIENT
Start: 2019-12-09 | End: 2019-12-12
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
## 2019-12-09 NOTE — PATIENT INSTRUCTIONS
Office Policies Phone calls/patient messages: Please allow up to 24 hours for someone in the office to contact you about your call or message. Be mindful your provider may be out of the office or your message may require further review. We encourage you to use [email or web address] for your messages as this is a faster, more efficient way to communicate with our office Medication Refills: 
         
Prescription medications require 48-72 business hours to process. We encourage you to use [email or web address] for your refills. For controlled medications: Please allow 72 business hours to process. Certain medications may require you to  a written prescription at our office. NO narcotic/controlled medications will be prescribed after 4pm Monday through Friday or on weekends Form/Paperwork Completion: 
         
Please note a $25 fee may incur for all paperwork for completed by our providers. We ask that you allow 7-10 business days. Pre-payment is due prior to picking up/faxing the completed form. You may also download your forms to [email or web address] to have your doctor print off. 
 
 
1. Have you been to the ER, urgent care clinic since your last visit? Hospitalized since your last visit?no 2. Have you seen or consulted any other health care providers outside of the [address] [place] [address] since your last visit? Include any pap smears or colon screening.  no

## 2019-12-10 ENCOUNTER — TELEPHONE (OUTPATIENT)
Dept: INTERNAL MEDICINE CLINIC | Age: 69
End: 2019-12-10

## 2019-12-10 NOTE — TELEPHONE ENCOUNTER
Patient calling regarding scripts that were supposed to be sent over yesterday and the pharmacy did not receive them. [facility] on [address] is where she would like them sent. Please call patient and pharmacy.

## 2019-12-12 LAB
ALBUMIN SERPL-MCNC: 3.9 G/DL (ref 3.5–4.8)
ALBUMIN/GLOB SERPL: 1.1 {RATIO} (ref 1.2–2.2)
ALP SERPL-CCNC: 93 IU/L (ref 39–117)
ALT SERPL-CCNC: 44 IU/L (ref 0–32)
AST SERPL-CCNC: 58 IU/L (ref 0–40)
BASOPHILS # BLD AUTO: 0 X10E3/UL (ref 0–0.2)
BASOPHILS NFR BLD AUTO: 1 %
BILIRUB SERPL-MCNC: 0.8 MG/DL (ref 0–1.2)
BUN SERPL-MCNC: 16 MG/DL (ref 8–27)
BUN/CREAT SERPL: 14 (ref 12–28)
CALCIUM SERPL-MCNC: 10 MG/DL (ref 8.7–10.3)
CHLORIDE SERPL-SCNC: 106 MMOL/L (ref 96–106)
CHOLEST SERPL-MCNC: 165 MG/DL (ref 100–199)
CO2 SERPL-SCNC: 23 MMOL/L (ref 20–29)
CREAT SERPL-MCNC: 1.12 MG/DL (ref 0.57–1)
EOSINOPHIL # BLD AUTO: 0.1 X10E3/UL (ref 0–0.4)
EOSINOPHIL NFR BLD AUTO: 2 %
ERYTHROCYTE [DISTWIDTH] IN BLOOD BY AUTOMATED COUNT: 13 % (ref 12.3–15.4)
GLOBULIN SER CALC-MCNC: 3.5 G/DL (ref 1.5–4.5)
GLUCOSE SERPL-MCNC: 81 MG/DL (ref 65–99)
HCT VFR BLD AUTO: 41.5 % (ref 34–46.6)
HDLC SERPL-MCNC: 47 MG/DL
HGB BLD-MCNC: 14 G/DL (ref 11.1–15.9)
IMM GRANULOCYTES # BLD AUTO: 0 X10E3/UL (ref 0–0.1)
IMM GRANULOCYTES NFR BLD AUTO: 0 %
LDLC SERPL CALC-MCNC: 99 MG/DL (ref 0–99)
LEVETIRACETAM SERPL-MCNC: 30.7 UG/ML (ref 10–40)
LYMPHOCYTES # BLD AUTO: 1.6 X10E3/UL (ref 0.7–3.1)
LYMPHOCYTES NFR BLD AUTO: 42 %
MCH RBC QN AUTO: 31 PG (ref 26.6–33)
MCHC RBC AUTO-ENTMCNC: 33.7 G/DL (ref 31.5–35.7)
MCV RBC AUTO: 92 FL (ref 79–97)
MONOCYTES # BLD AUTO: 0.5 X10E3/UL (ref 0.1–0.9)
MONOCYTES NFR BLD AUTO: 12 %
NEUTROPHILS # BLD AUTO: 1.7 X10E3/UL (ref 1.4–7)
NEUTROPHILS NFR BLD AUTO: 43 %
PLATELET # BLD AUTO: 175 X10E3/UL (ref 150–450)
POTASSIUM SERPL-SCNC: 4.7 MMOL/L (ref 3.5–5.2)
PROT SERPL-MCNC: 7.4 G/DL (ref 6–8.5)
RBC # BLD AUTO: 4.51 X10E6/UL (ref 3.77–5.28)
SODIUM SERPL-SCNC: 143 MMOL/L (ref 134–144)
TRIGL SERPL-MCNC: 96 MG/DL (ref 0–149)
URATE SERPL-MCNC: 8.7 MG/DL (ref 2.5–7.1)
VLDLC SERPL CALC-MCNC: 19 MG/DL (ref 5–40)
WBC # BLD AUTO: 3.9 X10E3/UL (ref 3.4–10.8)

## 2020-01-12 DIAGNOSIS — I10 ESSENTIAL HYPERTENSION: ICD-10-CM

## 2020-01-12 RX ORDER — ROSUVASTATIN CALCIUM 10 MG/1
TABLET, COATED ORAL
Qty: 90 TAB | Refills: 3 | Status: SHIPPED | OUTPATIENT
Start: 2020-01-12 | End: 2020-01-20

## 2020-01-12 RX ORDER — SPIRONOLACTONE 25 MG/1
TABLET ORAL
Qty: 90 TAB | Refills: 3 | Status: SHIPPED | OUTPATIENT
Start: 2020-01-12 | End: 2020-03-16

## 2020-01-12 RX ORDER — LOSARTAN POTASSIUM 100 MG/1
TABLET ORAL
Qty: 90 TAB | Refills: 3 | Status: SHIPPED | OUTPATIENT
Start: 2020-01-12 | End: 2021-01-04

## 2020-01-12 RX ORDER — SPIRONOLACTONE 25 MG/1
TABLET ORAL
Qty: 90 TAB | Refills: 3 | Status: SHIPPED | OUTPATIENT
Start: 2020-01-12 | End: 2020-01-20 | Stop reason: SDUPTHER

## 2020-01-14 DIAGNOSIS — R56.1 POST-TRAUMATIC SEIZURES (HCC): ICD-10-CM

## 2020-01-14 RX ORDER — LEVETIRACETAM 500 MG/1
500 TABLET ORAL 2 TIMES DAILY
Qty: 180 TAB | Refills: 3 | Status: SHIPPED | OUTPATIENT
Start: 2020-01-14 | End: 2020-04-10 | Stop reason: SDUPTHER

## 2020-01-14 RX ORDER — LEVETIRACETAM 500 MG/1
500 TABLET ORAL 2 TIMES DAILY
Qty: 180 TAB | Refills: 3 | Status: SHIPPED | OUTPATIENT
Start: 2020-01-14 | End: 2020-01-14 | Stop reason: SDUPTHER

## 2020-01-19 PROBLEM — I25.2 MYOCARDIAL INFARCT, OLD: Status: ACTIVE | Noted: 2020-01-19
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
## 2020-01-20 ENCOUNTER — OFFICE VISIT (OUTPATIENT)
Dept: CARDIOLOGY CLINIC | Age: 70
End: 2020-01-20

## 2020-01-20 VITALS
HEIGHT: 62 IN | RESPIRATION RATE: 18 BRPM | SYSTOLIC BLOOD PRESSURE: 132 MMHG | DIASTOLIC BLOOD PRESSURE: 78 MMHG | HEART RATE: 93 BPM | OXYGEN SATURATION: 99 % | BODY MASS INDEX: 40.3 KG/M2 | WEIGHT: 219 LBS

## 2020-01-20 DIAGNOSIS — I10 HYPERTENSION, UNSPECIFIED TYPE: Primary | ICD-10-CM

## 2020-01-20 DIAGNOSIS — I63.9 CEREBROVASCULAR ACCIDENT (CVA), UNSPECIFIED MECHANISM (HCC): ICD-10-CM

## 2020-01-20 DIAGNOSIS — I25.2 MYOCARDIAL INFARCT, OLD: ICD-10-CM

## 2020-01-20 DIAGNOSIS — I25.10 CORONARY ARTERY DISEASE INVOLVING NATIVE CORONARY ARTERY OF NATIVE HEART WITHOUT ANGINA PECTORIS: ICD-10-CM

## 2020-01-20 DIAGNOSIS — I87.2 CHRONIC VENOUS INSUFFICIENCY: ICD-10-CM

## 2020-01-20 RX ORDER — ROSUVASTATIN CALCIUM 10 MG/1
20 TABLET, COATED ORAL
Qty: 90 TAB | Refills: 3 | Status: SHIPPED | OUTPATIENT
Start: 2020-01-20 | End: 2020-07-07

## 2020-01-20 RX ORDER — DICLOFENAC SODIUM 75 MG/1
TABLET, DELAYED RELEASE ORAL
COMMUNITY
Start: 2020-01-14

## 2020-01-20 NOTE — PROGRESS NOTES
Chief Complaint   Patient presents with    Hypertension     6 month follow up     1. Have you been to the ER, urgent care clinic since your last visit? Hospitalized since your last visit? No    2. Have you seen or consulted any other health care providers outside of the [address] [place] [address] since your last visit? Include any pap smears or colon screening.  No

## 2020-02-04 ENCOUNTER — HOSPITAL ENCOUNTER (OUTPATIENT)
Dept: NUCLEAR MEDICINE | Age: 70
Discharge: HOME | End: 2020-02-04
Payer: COMMERCIAL

## 2020-02-04 ENCOUNTER — HOSPITAL ENCOUNTER (OUTPATIENT)
Dept: NON INVASIVE DIAGNOSTICS | Age: 70
Discharge: HOME | End: 2020-02-04
Payer: COMMERCIAL

## 2020-02-04 VITALS
BODY MASS INDEX: 40.3 KG/M2 | DIASTOLIC BLOOD PRESSURE: 88 MMHG | HEIGHT: 62 IN | WEIGHT: 219 LBS | SYSTOLIC BLOOD PRESSURE: 160 MMHG

## 2020-02-04 DIAGNOSIS — I25.10 CORONARY ARTERY DISEASE INVOLVING NATIVE CORONARY ARTERY OF NATIVE HEART WITHOUT ANGINA PECTORIS: ICD-10-CM

## 2020-02-04 DIAGNOSIS — I10 HYPERTENSION, UNSPECIFIED TYPE: ICD-10-CM

## 2020-02-04 DIAGNOSIS — I25.2 MYOCARDIAL INFARCT, OLD: ICD-10-CM

## 2020-02-04 DIAGNOSIS — I63.9 CEREBROVASCULAR ACCIDENT (CVA), UNSPECIFIED MECHANISM (HCC): ICD-10-CM

## 2020-02-04 DIAGNOSIS — I87.2 CHRONIC VENOUS INSUFFICIENCY: ICD-10-CM

## 2020-02-04 LAB
STRESS BASELINE DIAS BP: 88 MMHG
STRESS BASELINE HR: 74 BPM
STRESS BASELINE SYS BP: 160 MMHG
STRESS ESTIMATED WORKLOAD: 1 METS
STRESS EXERCISE DUR MIN: NORMAL
STRESS O2 SAT REST: 99 %
STRESS PEAK DIAS BP: 88 MMHG
STRESS PEAK SYS BP: 160 MMHG
STRESS PERCENT HR ACHIEVED: 59 %
STRESS POST PEAK HR: 89 BPM
STRESS RATE PRESSURE PRODUCT: NORMAL BPM*MMHG
STRESS ST DEPRESSION: 0 MM
STRESS ST ELEVATION: 0 MM
STRESS TARGET HR: 150 BPM

## 2020-02-04 PROCEDURE — 93017 CV STRESS TEST TRACING ONLY: CPT

## 2020-02-04 PROCEDURE — 74011250636 HC RX REV CODE- 250/636

## 2020-02-04 RX ORDER — SODIUM CHLORIDE 0.9 % (FLUSH) 0.9 %
10 SYRINGE (ML) INJECTION AS NEEDED
Status: DISCONTINUED | OUTPATIENT
Start: 2020-02-04 | End: 2020-02-08 | Stop reason: HOSPADM

## 2020-02-04 RX ORDER — SODIUM CHLORIDE 0.9 % (FLUSH) 0.9 %
10 SYRINGE (ML) INJECTION
Status: COMPLETED | OUTPATIENT
Start: 2020-02-04 | End: 2020-02-04

## 2020-02-04 RX ADMIN — REGADENOSON 0.4 MG: 0.08 INJECTION, SOLUTION INTRAVENOUS at 12:15

## 2020-02-04 RX ADMIN — Medication 10 ML: at 10:51

## 2020-02-05 NOTE — PROGRESS NOTES
Please call patient, stress test finds her old heart attack, no areas of reduced blood flow.   Increase exercise and activity and follow up in 6 months

## 2020-02-06 NOTE — PROGRESS NOTES
Spoke with patient  Verified patient with 2 patient identifiers  Informed per Andriy Isabel NP stress test finds her old heart attack, no areas of reduced blood flow. Increase exercise and activity and follow up in 6 months   Patient verbalized understanding.

## 2020-03-16 RX ORDER — SPIRONOLACTONE 25 MG/1
TABLET ORAL
Qty: 30 TAB | Refills: 5 | Status: SHIPPED | OUTPATIENT
Start: 2020-03-16 | End: 2021-02-02 | Stop reason: SDUPTHER

## 2020-03-16 RX ORDER — AMLODIPINE BESYLATE 5 MG/1
TABLET ORAL
Qty: 30 TAB | Refills: 5 | Status: SHIPPED | OUTPATIENT
Start: 2020-03-16 | End: 2020-07-09

## 2020-04-07 ENCOUNTER — TELEPHONE (OUTPATIENT)
Dept: INTERNAL MEDICINE CLINIC | Age: 70
End: 2020-04-07

## 2020-04-07 NOTE — TELEPHONE ENCOUNTER
Caller's first and last name:Marily hawley       Reason for call:pt wants to speak to [facility] about a Skype appt. .Pt also wants to make sure the doctor has the [facility] paperwork she dropped off.        Callback required yes/no and why:yes       Best contact number(s):[phone]       Details to clarify the request:       Shelly Barillas 25

## 2020-04-09 ENCOUNTER — TELEPHONE (OUTPATIENT)
Dept: INTERNAL MEDICINE CLINIC | Age: 70
End: 2020-04-09

## 2020-04-09 NOTE — TELEPHONE ENCOUNTER
Pt states that the documents have to be faxed to [facility] as it states on the back of the form. There should be a fax number on the form. Please fax to [facility] and then mail a copy to patient.

## 2020-04-09 NOTE — TELEPHONE ENCOUNTER
Pt called in  Two pt identifiers confirmed. Pt stated she was supposed to have an appt on 04/07/2020. Pt informed did not see the appt and apologized. Pt offered and accepted virtual appt for Friday, April 10, 2020 01:30 PM w/ Dr. Nasir Olson. Pt verbalized understanding of information discussed w/ no further questions at this time.

## 2020-04-10 ENCOUNTER — VIRTUAL VISIT (OUTPATIENT)
Dept: INTERNAL MEDICINE CLINIC | Age: 70
End: 2020-04-10

## 2020-04-10 DIAGNOSIS — M54.9 BACK PAIN, UNSPECIFIED BACK LOCATION, UNSPECIFIED BACK PAIN LATERALITY, UNSPECIFIED CHRONICITY: ICD-10-CM

## 2020-04-10 DIAGNOSIS — R56.1 POST-TRAUMATIC SEIZURES (HCC): ICD-10-CM

## 2020-04-10 RX ORDER — LEVETIRACETAM 500 MG/1
500 TABLET ORAL 2 TIMES DAILY
Qty: 180 TAB | Refills: 3 | Status: SHIPPED | OUTPATIENT
Start: 2020-04-10 | End: 2020-07-09

## 2020-04-10 RX ORDER — METHOCARBAMOL 750 MG/1
750 TABLET, FILM COATED ORAL 4 TIMES DAILY
Qty: 60 TAB | Refills: 1 | Status: SHIPPED | OUTPATIENT
Start: 2020-04-10 | End: 2020-12-14 | Stop reason: SDUPTHER

## 2020-04-10 RX ORDER — MELOXICAM 15 MG/1
15 TABLET ORAL DAILY
Qty: 30 TAB | Refills: 0 | Status: SHIPPED | OUTPATIENT
Start: 2020-04-10 | End: 2020-04-10

## 2020-04-10 RX ORDER — MELOXICAM 15 MG/1
TABLET ORAL
Qty: 90 TAB | Refills: 3 | Status: SHIPPED | OUTPATIENT
Start: 2020-04-10 | End: 2021-01-14 | Stop reason: SDUPTHER
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
## 2020-04-10 NOTE — PATIENT INSTRUCTIONS
Office Policies Phone calls/patient messages: Please allow up to 24 hours for someone in the office to contact you about your call or message. Be mindful your provider may be out of the office or your message may require further review. We encourage you to use [employer] for your messages as this is a faster, more efficient way to communicate with our office Medication Refills: 
         
Prescription medications require 48-72 business hours to process. We encourage you to use [employer] for your refills. For controlled medications: Please allow 72 business hours to process. Certain medications may require you to  a written prescription at our office. NO narcotic/controlled medications will be prescribed after 4pm Monday through Friday or on weekends Form/Paperwork Completion: 
         
Please note a $25 fee may incur for all paperwork for completed by our providers. We ask that you allow 7-10 business days. Pre-payment is due prior to picking up/faxing the completed form. You may also download your forms to [employer] to have your doctor print off. 
 
 
1. Have you been to the ER, urgent care clinic since your last visit? Hospitalized since your last visit?no 2. Have you seen or consulted any other health care providers outside of the [address] [place] [address] since your last visit? Include any pap smears or colon screening.  no

## 2020-04-13 ENCOUNTER — TELEPHONE (OUTPATIENT)
Dept: INTERNAL MEDICINE CLINIC | Age: 70
End: 2020-04-13

## 2020-04-13 ENCOUNTER — VIRTUAL VISIT (OUTPATIENT)
Dept: INTERNAL MEDICINE CLINIC | Age: 70
End: 2020-04-13

## 2020-04-13 DIAGNOSIS — L03.311 CELLULITIS, ABDOMINAL WALL: Primary | ICD-10-CM

## 2020-04-13 RX ORDER — CLINDAMYCIN HYDROCHLORIDE 300 MG/1
300 CAPSULE ORAL 3 TIMES DAILY
Qty: 30 CAP | Refills: 0 | Status: SHIPPED | OUTPATIENT
Start: 2020-04-13 | End: 2020-05-15

## 2020-04-13 NOTE — TELEPHONE ENCOUNTER
Called, spoke to pt. Two pt identifiers confirmed. Pt informed per Dr. Brayan Wu to do a VV for sx. Pt offered/accepted Virtual appt for 4/13/20 at 1130. Informed pt that it will be billed under insurance and pt may expect a co-pay. Informed that pt must have access to a smartphone and/or a computer w/ a camera and a microphone. Informed pt that a PSR may contact pt roughly 15 minutes prior to Shelly Richard 1237 for check-in. Pt verbalized understanding of information discussed w/ no further questions at this time.

## 2020-04-13 NOTE — PATIENT INSTRUCTIONS
Office Policies Phone calls/patient messages: Please allow up to 24 hours for someone in the office to contact you about your call or message. Be mindful your provider may be out of the office or your message may require further review. We encourage you to use [employer] for your messages as this is a faster, more efficient way to communicate with our office Medication Refills: 
         
Prescription medications require 48-72 business hours to process. We encourage you to use [employer] for your refills. For controlled medications: Please allow 72 business hours to process. Certain medications may require you to  a written prescription at our office. NO narcotic/controlled medications will be prescribed after 4pm Monday through Friday or on weekends Form/Paperwork Completion: 
         
Please note a $25 fee may incur for all paperwork for completed by our providers. We ask that you allow 7-10 business days. Pre-payment is due prior to picking up/faxing the completed form. You may also download your forms to [employer] to have your doctor print off. 
 
 
1. Have you been to the ER, urgent care clinic since your last visit? Hospitalized since your last visit?no 2. Have you seen or consulted any other health care providers outside of the [address] [place] [address] since your last visit? Include any pap smears or colon screening.  no

## 2020-04-13 NOTE — TELEPHONE ENCOUNTER
Identified patient 2 identifiers verified.   Patient agreed to have a virtual visit with Dr. Angie Tuttle today at 3:45 pm.

## 2020-04-13 NOTE — PROGRESS NOTES
Consent: Antonio Nichols, who was seen by synchronous (real-time) audio-video technology, and/or her healthcare decision maker, is aware that this patient-initiated, Telehealth encounter on 4/13/2020 is a billable service, with coverage as determined by her insurance carrier. She is aware that she may receive a bill and has provided verbal consent to proceed: Yes. Assessment & Plan:   Diagnoses and all orders for this visit:    1. Cellulitis, abdominal wall  -     clindamycin (CLEOCIN) 300 mg capsule; Take 1 Cap by mouth three (3) times daily for 10 days. Follow-up and Dispositions    · Return if symptoms worsen or fail to improve. 712  Subjective:   Antonio Nichols is a 69 y.o. female who was seen for Skin Problem (pt concerned of rash on stomach - blood red , itchy, & irritation x 2 days)    Chief Complaint   Patient presents with    Skin Problem     pt concerned of rash on stomach - blood red , itchy, & irritation x 2 days     About two days ago, she noticed a rash on abdomen. It is bright red and painful. She grimaces and winces when moving. Objective:   Vital Signs: (As obtained by patient/caregiver at home)  There were no vitals taken for this visit.      Constitutional: [x] Appears well-developed and well-nourished [x] No apparent distress      [] Abnormal -     Mental status: [x] Alert and awake  [x] Oriented to person/place/time [x] Able to follow commands    [] Abnormal -     Eyes:   EOM    [x]  Normal    [] Abnormal -   Sclera  [x]  Normal    [] Abnormal -          Discharge [x]  None visible   [] Abnormal -     HENT: [x] Normocephalic, atraumatic  [] Abnormal -   [x] Mouth/Throat: Mucous membranes are moist    External Ears [x] Normal  [] Abnormal -    Neck: [x] No visualized mass [] Abnormal -     Pulmonary/Chest: [x] Respiratory effort normal   [x] No visualized signs of difficulty breathing or respiratory distress        [] Abnormal -      Musculoskeletal:   [x] Normal gait with no signs of ataxia         [x] Normal range of motion of neck        [] Abnormal -     Neurological:        [x] No Facial Asymmetry (Cranial nerve 7 motor function) (limited exam due to video visit)          [x] No gaze palsy        [] Abnormal -          Skin:                [x] Abnormal - Rash described above was visualized. Psychiatric:       [x] Normal Affect [] Abnormal -        [x] No Hallucinations    Other pertinent observable physical exam findings:-Diffuse erythema of skin of her lower abdomin, bilaterally. We discussed the expected course, resolution and complications of the diagnosis(es) in detail. Medication risks, benefits, costs, interactions, and alternatives were discussed as indicated. I advised her to contact the office if her condition worsens, changes or fails to improve as anticipated. She expressed understanding with the diagnosis(es) and plan. Thais Thomas is a 69 y.o. female being evaluated by a video visit encounter for concerns as above. A caregiver was present when appropriate. Due to this being a TeleHealth encounter (During [facility]- public health emergency), evaluation of the following organ systems was limited: Vitals/Constitutional/EENT/Resp/CV/GI//MS/Neuro/Skin/Heme-Lymph-Imm. Pursuant to the emergency declaration under the [facility], 1135 waiver authority and the [employer] Act, this Virtual  Visit was conducted, with patient's (and/or legal guardian's) consent, to reduce the patient's risk of exposure to COVID-19 and provide necessary medical care. Services were provided through a video synchronous discussion virtually to substitute for in-person clinic visit. Patient and provider were located at their individual homes.         Panfilo Greenberg MD

## 2020-04-13 NOTE — TELEPHONE ENCOUNTER
----- Message from Lazarus Lieu sent at 4/13/2020  3:01 PM EDT -----  Regarding: Dr. Elizabeth Mosley first and last name: Srinivas Hernandez  Reason for call:   returning call from Dr madhavi Paz required yes/no and why: yes  Best contact number(s):  [phone]  Details to clarify the request:      Copy/paste envera

## 2020-04-13 NOTE — TELEPHONE ENCOUNTER
Called, spoke to pt. Two pt identifiers confirmed. C/o abd pain. Burning pain. Pt states stomach is \"blood red\" and is \"maybe a yeast infection\"  Reports TTP and pain with ambulation. Pain is outside of stomach per pt. Denies pain inside of abd.   x2 days. Radiating around abd. Pt informed Dr. Ramonita Heaton will be notified. Pt verbalized understanding of information discussed w/ no further questions at this time.

## 2020-04-19 DIAGNOSIS — I25.10 CORONARY ARTERY DISEASE INVOLVING NATIVE CORONARY ARTERY OF NATIVE HEART WITHOUT ANGINA PECTORIS: ICD-10-CM

## 2020-04-19 DIAGNOSIS — I63.9 CEREBROVASCULAR ACCIDENT (CVA), UNSPECIFIED MECHANISM (HCC): ICD-10-CM

## 2020-04-19 DIAGNOSIS — I25.2 MYOCARDIAL INFARCT, OLD: ICD-10-CM

## 2020-04-19 DIAGNOSIS — I10 HYPERTENSION, UNSPECIFIED TYPE: ICD-10-CM

## 2020-04-19 DIAGNOSIS — I87.2 CHRONIC VENOUS INSUFFICIENCY: ICD-10-CM

## 2020-04-29 ENCOUNTER — TELEPHONE (OUTPATIENT)
Dept: INTERNAL MEDICINE CLINIC | Age: 70
End: 2020-04-29

## 2020-04-29 NOTE — TELEPHONE ENCOUNTER
#[phone] pt is asking if Dr. Nasir Olson faxed the competed form to [facility]? Pt states she is not allowed to do this the pcp has to. Pt states she received a copy in the mail but needs to know today if this was faxed as it is due on 4-30-20. She didn't have a fax number, but these are the two numbers to [facility] that she has #[phone] or #[phone]      Please call pt to let her know if this was faxed. Thanks.

## 2020-05-14 DIAGNOSIS — L03.311 CELLULITIS, ABDOMINAL WALL: ICD-10-CM

## 2020-05-15 RX ORDER — CLINDAMYCIN HYDROCHLORIDE 300 MG/1
CAPSULE ORAL
Qty: 30 CAP | Refills: 0 | Status: SHIPPED | OUTPATIENT
Start: 2020-05-15 | End: 2020-05-19 | Stop reason: SDUPTHER

## 2020-05-19 ENCOUNTER — VIRTUAL VISIT (OUTPATIENT)
Dept: INTERNAL MEDICINE CLINIC | Age: 70
End: 2020-05-19

## 2020-05-19 DIAGNOSIS — R11.0 NAUSEA: ICD-10-CM

## 2020-05-19 DIAGNOSIS — L03.311 CELLULITIS, ABDOMINAL WALL: Primary | ICD-10-CM

## 2020-05-19 RX ORDER — CLINDAMYCIN HYDROCHLORIDE 300 MG/1
CAPSULE ORAL
Qty: 15 CAP | Refills: 0 | Status: SHIPPED | OUTPATIENT
Start: 2020-05-19 | End: 2020-07-09

## 2020-05-19 RX ORDER — ONDANSETRON 4 MG/1
4 TABLET, ORALLY DISINTEGRATING ORAL
Qty: 15 TAB | Refills: 1 | Status: SHIPPED | OUTPATIENT
Start: 2020-05-19 | End: 2022-02-07 | Stop reason: ALTCHOICE

## 2020-05-19 NOTE — PATIENT INSTRUCTIONS
Office Policies Phone calls/patient messages: Please allow up to 24 hours for someone in the office to contact you about your call or message. Be mindful your provider may be out of the office or your message may require further review. We encourage you to use [employer] for your messages as this is a faster, more efficient way to communicate with our office Medication Refills: 
         
Prescription medications require 48-72 business hours to process. We encourage you to use [employer] for your refills. For controlled medications: Please allow 72 business hours to process. Certain medications may require you to  a written prescription at our office. NO narcotic/controlled medications will be prescribed after 4pm Monday through Friday or on weekends Form/Paperwork Completion: 
         
Please note a $25 fee may incur for all paperwork for completed by our providers. We ask that you allow 7-10 business days. Pre-payment is due prior to picking up/faxing the completed form. You may also download your forms to [employer] to have your doctor print off. 
 
 
1. Have you been to the ER, urgent care clinic since your last visit? Hospitalized since your last visit?no 2. Have you seen or consulted any other health care providers outside of the [address] [place] [place] [address] since your last visit? Include any pap smears or colon screening.  no

## 2020-05-19 NOTE — PROGRESS NOTES
SUBJECTIVE  Ms. Jeffery Blanco presents today acutely for the following. Although the office was closed, and she was checked in virtually, I met her in the parking lot for the exam, which was done in person. Chief Complaint   Patient presents with    Skin Infection     pt c.o cellulitis on her stomach; pt states that she gets sick on her stomach     She has ongoing pain in her lower abdomen. We saw her for this a month ago:   Chief Complaint   Patient presents with    Skin Problem     pt concerned of rash on stomach - blood red , itchy, & irritation x 2 days     About two days ago, she noticed a rash on abdomen. It is bright red and painful. She grimaces and winces when moving. Today the redness is better, but she still has the pain. \"Why won't it go away? \"    OBJECTIVE  There were no vitals taken for this visit. Gen: Pleasant 69 y.o.  female in NAD.       ABDOMEN: obese, soft, ND, BS+.   EXTREMITIES: Warm. SKIN: Lower abdomen (panniculus) exhibits some warmth and superficial tenderness. The redness is much better. ASSESSMENT / PLAN  Cellulitis abdominal wall: Improved but not resolved. Orders Placed This Encounter    clindamycin (CLEOCIN) 300 mg capsule     Sig: TAKE 1 CAPSULE BY MOUTH THREE TIMES DAILY FOR 5 DAYS     Dispense:  15 Cap     Refill:  0       I have reviewed with the patient details of the assessment and plan and all questions were answered. Relevant patient education was performed. Follow-up and Dispositions    · Return if symptoms worsen or fail to improve.

## 2020-07-12 DIAGNOSIS — L03.311 CELLULITIS, ABDOMINAL WALL: ICD-10-CM

## 2020-07-14 RX ORDER — CLINDAMYCIN HYDROCHLORIDE 300 MG/1
CAPSULE ORAL
Qty: 15 CAP | Refills: 0 | Status: SHIPPED | OUTPATIENT
Start: 2020-07-14 | End: 2020-08-11 | Stop reason: SDUPTHER

## 2020-07-20 ENCOUNTER — TELEPHONE (OUTPATIENT)
Dept: INTERNAL MEDICINE CLINIC | Age: 70
End: 2020-07-20

## 2020-07-20 DIAGNOSIS — Z12.31 ENCOUNTER FOR SCREENING MAMMOGRAM FOR BREAST CANCER: Primary | ICD-10-CM

## 2020-07-20 NOTE — TELEPHONE ENCOUNTER
Patient states she needs to get an Order done for a Mammogram that patient got a Reminder letter to have done by [facility]. 8/9/20. Please call.  Thank you

## 2020-08-11 DIAGNOSIS — L03.311 CELLULITIS, ABDOMINAL WALL: ICD-10-CM

## 2020-08-11 RX ORDER — CLINDAMYCIN HYDROCHLORIDE 300 MG/1
CAPSULE ORAL
Qty: 15 CAP | Refills: 0 | Status: SHIPPED | OUTPATIENT
Start: 2020-08-11 | End: 2021-05-25

## 2020-08-11 NOTE — TELEPHONE ENCOUNTER
Patient states she needs to get a refill on her medication for her Persistent Cellulitis that keeps coming back on her stomach. Please call if any questions or when done. Thank you    Pharmacy is [facility] on file.

## 2020-08-11 NOTE — TELEPHONE ENCOUNTER
Called, spoke with Pt. Two pt identifiers confirmed. Pt asked what medication she needed a refill on. Pt stated the clindamycin. Pt informed will send Dr. Esther Watkins the refill request.   Pt verbalized understanding of information discussed w/ no further questions at this time.

## 2020-08-27 ENCOUNTER — HOSPITAL ENCOUNTER (OUTPATIENT)
Dept: MAMMOGRAPHY | Age: 70
Discharge: HOME | End: 2020-08-27
Attending: INTERNAL MEDICINE
Payer: COMMERCIAL

## 2020-08-27 DIAGNOSIS — Z12.31 ENCOUNTER FOR SCREENING MAMMOGRAM FOR BREAST CANCER: ICD-10-CM

## 2020-08-27 PROCEDURE — 77067 SCR MAMMO BI INCL CAD: CPT

## 2020-08-28 ENCOUNTER — VIRTUAL VISIT (OUTPATIENT)
Dept: INTERNAL MEDICINE CLINIC | Age: 70
End: 2020-08-28
Payer: COMMERCIAL

## 2020-08-28 DIAGNOSIS — L03.311 CELLULITIS, ABDOMINAL WALL: ICD-10-CM

## 2020-08-28 PROCEDURE — 99213 OFFICE O/P EST LOW 20 MIN: CPT | Performed by: INTERNAL MEDICINE

## 2020-08-28 RX ORDER — SULFAMETHOXAZOLE AND TRIMETHOPRIM 800; 160 MG/1; MG/1
1 TABLET ORAL 2 TIMES DAILY
Qty: 20 TAB | Refills: 0 | Status: SHIPPED | OUTPATIENT
Start: 2020-08-28 | End: 2020-11-17

## 2020-08-28 NOTE — PROGRESS NOTES
Consent: Matt Garcia, who was seen by synchronous (real-time) audio-video technology, and/or her healthcare decision maker, is aware that this patient-initiated, Telehealth encounter on 8/28/2020 is a billable service, with coverage as determined by her insurance carrier. She is aware that she may receive a bill and has provided verbal consent to proceed: Yes. Assessment & Plan:   Diagnoses and all orders for this visit:    1. Cellulitis, abdominal wall  -     trimethoprim-sulfamethoxazole (BACTRIM DS, SEPTRA DS) 160-800 mg per tablet; Take 1 Tab by mouth two (2) times a day for 10 days. RTC as planned. 712  Subjective:   Matt Garcia is a 69 y.o. female who was seen for Skin Infection (pt c.o cellulitis (on stomach)- pt wants to discuss medicaiton ; pt states stomach is blood red; today pt rates pain 8/10) and Ankle Pain (pt c.o her R ankle aching since yesterday; today pt rates pain 10/10 )    Chief Complaint   Patient presents with    Skin Infection     pt c.o cellulitis (on stomach)- pt wants to discuss medicaiton ; pt states stomach is blood red; today pt rates pain 8/10    Ankle Pain     pt c.o her R ankle aching since yesterday; today pt rates pain 10/10      She has an ongoing issue with cellulitis of her lower abdomen--redness, pain, swelling, and warmth. She gets improvement with antibiotics, but then it seems to come right back. She was treated with clindamycin on 8/11, 7/14, 5/15. Objective:   Vital Signs: (As obtained by patient/caregiver at home)  There were no vitals taken for this visit.      Constitutional: [x] Appears well-developed and well-nourished [x] No apparent distress      [] Abnormal -     Mental status: [x] Alert and awake  [x] Oriented to person/place/time [x] Able to follow commands    [] Abnormal -     Eyes:   EOM    [x]  Normal    [] Abnormal -   Sclera  [x]  Normal    [] Abnormal -          Discharge [x]  None visible   [] Abnormal -     HENT: [x] Normocephalic, atraumatic  [] Abnormal -   [x] Mouth/Throat: Mucous membranes are moist    External Ears [x] Normal  [] Abnormal -    Neck: [x] No visualized mass [] Abnormal -     Pulmonary/Chest: [x] Respiratory effort normal   [x] No visualized signs of difficulty breathing or respiratory distress        [] Abnormal -      Musculoskeletal:   [x] Normal gait with no signs of ataxia         [x] Normal range of motion of neck        [] Abnormal -     Neurological:        [x] No Facial Asymmetry (Cranial nerve 7 motor function) (limited exam due to video visit)          [x] No gaze palsy        [] Abnormal -          Skin:                [x] Abnormal - Rash described above was visualized. Psychiatric:       [x] Normal Affect [] Abnormal -        [x] No Hallucinations    Other pertinent observable physical exam findings:-Diffuse erythema of skin of her lower abdomin, bilaterally. We discussed the expected course, resolution and complications of the diagnosis(es) in detail. Medication risks, benefits, costs, interactions, and alternatives were discussed as indicated. I advised her to contact the office if her condition worsens, changes or fails to improve as anticipated. She expressed understanding with the diagnosis(es) and plan. Chandan Maher is a 69 y.o. female being evaluated by a video visit encounter for concerns as above. A caregiver was present when appropriate. Due to this being a TeleHealth encounter (During [place] [address] public health emergency), evaluation of the following organ systems was limited: Vitals/Constitutional/EENT/Resp/CV/GI//MS/Neuro/Skin/Heme-Lymph-Imm.   Pursuant to the emergency declaration under the [address] [place] [address], [address] [place] [address] authority and the [employer] Act, this Virtual  Visit was conducted, with patient's (and/or legal guardian's) consent, to reduce the patient's risk of exposure to COVID-19 and provide necessary medical care. Services were provided through a video synchronous discussion virtually to substitute for in-person clinic visit. Patient and provider were located at their individual homes.         Towana Angelucci, MD

## 2020-09-15 RX ORDER — AMLODIPINE BESYLATE 5 MG/1
TABLET ORAL
Qty: 30 TAB | Refills: 5 | Status: SHIPPED | OUTPATIENT
Start: 2020-09-15 | End: 2021-04-05 | Stop reason: SDUPTHER
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
## 2020-10-02 ENCOUNTER — OFFICE VISIT (OUTPATIENT)
Dept: CARDIOLOGY CLINIC | Age: 70
End: 2020-10-02
Payer: COMMERCIAL

## 2020-10-02 VITALS
SYSTOLIC BLOOD PRESSURE: 116 MMHG | HEART RATE: 76 BPM | RESPIRATION RATE: 16 BRPM | WEIGHT: 210 LBS | OXYGEN SATURATION: 99 % | TEMPERATURE: 96.4 F | BODY MASS INDEX: 38.64 KG/M2 | HEIGHT: 62 IN | DIASTOLIC BLOOD PRESSURE: 67 MMHG

## 2020-10-02 DIAGNOSIS — I87.2 CHRONIC VENOUS INSUFFICIENCY: ICD-10-CM

## 2020-10-02 DIAGNOSIS — I10 ESSENTIAL HYPERTENSION: Primary | ICD-10-CM

## 2020-10-02 DIAGNOSIS — I25.10 CORONARY ARTERY DISEASE INVOLVING NATIVE CORONARY ARTERY OF NATIVE HEART WITHOUT ANGINA PECTORIS: ICD-10-CM

## 2020-10-02 DIAGNOSIS — I63.9 CEREBROVASCULAR ACCIDENT (CVA), UNSPECIFIED MECHANISM (HCC): ICD-10-CM

## 2020-10-02 PROCEDURE — 99214 OFFICE O/P EST MOD 30 MIN: CPT | Performed by: NURSE PRACTITIONER

## 2020-10-02 RX ORDER — ASPIRIN 81 MG/1
TABLET ORAL DAILY
COMMUNITY

## 2020-10-02 NOTE — PROGRESS NOTES
Identified pt with two pt identifiers(name and ). Reviewed record in preparation for visit and have obtained necessary documentation. Chief Complaint   Patient presents with    Follow-up           Visit Vitals  /67 (BP 1 Location: Right arm, BP Patient Position: Sitting)   Pulse 76   Temp (!) 96.4 °F (35.8 °C) (Temporal)   Resp 16   Ht 5' 2\" (1.575 m)   Wt 210 lb (95.3 kg)   SpO2 99%   BMI 38.41 kg/m²     Pain Scale: 0 - No pain/10    Coordination of Care Questionnaire:  :   1. Have you been to the ER, urgent care clinic since your last visit? Hospitalized since your last visit? No    2. Have you seen or consulted any other health care providers outside of the [address] [place] [address] since your last visit? Include any pap smears or colon screening.  NO

## 2020-10-31 LAB
ALBUMIN SERPL-MCNC: 3.8 G/DL (ref 3.7–4.7)
ALBUMIN/GLOB SERPL: 1.2 {RATIO} (ref 1.2–2.2)
ALP SERPL-CCNC: 94 IU/L (ref 39–117)
ALT SERPL-CCNC: 24 IU/L (ref 0–32)
AST SERPL-CCNC: 34 IU/L (ref 0–40)
BILIRUB SERPL-MCNC: 0.8 MG/DL (ref 0–1.2)
BUN SERPL-MCNC: 12 MG/DL (ref 8–27)
BUN/CREAT SERPL: 13 (ref 12–28)
CALCIUM SERPL-MCNC: 9.3 MG/DL (ref 8.7–10.3)
CHLORIDE SERPL-SCNC: 108 MMOL/L (ref 96–106)
CHOLEST SERPL-MCNC: 147 MG/DL (ref 100–199)
CK SERPL-CCNC: 155 U/L (ref 32–182)
CO2 SERPL-SCNC: 26 MMOL/L (ref 20–29)
CREAT SERPL-MCNC: 0.95 MG/DL (ref 0.57–1)
GLOBULIN SER CALC-MCNC: 3.3 G/DL (ref 1.5–4.5)
GLUCOSE SERPL-MCNC: 87 MG/DL (ref 65–99)
HDLC SERPL-MCNC: 49 MG/DL
LDLC SERPL CALC-MCNC: 79 MG/DL (ref 0–99)
POTASSIUM SERPL-SCNC: 3.7 MMOL/L (ref 3.5–5.2)
PROT SERPL-MCNC: 7.1 G/DL (ref 6–8.5)
SODIUM SERPL-SCNC: 146 MMOL/L (ref 134–144)
TRIGL SERPL-MCNC: 104 MG/DL (ref 0–149)
VLDLC SERPL CALC-MCNC: 19 MG/DL (ref 5–40)

## 2020-11-17 RX ORDER — SULFAMETHOXAZOLE AND TRIMETHOPRIM 800; 160 MG/1; MG/1
TABLET ORAL
Qty: 20 TAB | Refills: 0 | Status: SHIPPED | OUTPATIENT
Start: 2020-11-17

## 2020-12-14 RX ORDER — METHOCARBAMOL 750 MG/1
750 TABLET, FILM COATED ORAL 4 TIMES DAILY
Qty: 60 TAB | Refills: 1 | Status: SHIPPED | OUTPATIENT
Start: 2020-12-14 | End: 2020-12-23

## 2020-12-18 ENCOUNTER — TELEPHONE (OUTPATIENT)
Dept: INTERNAL MEDICINE CLINIC | Age: 70
End: 2020-12-18

## 2020-12-18 NOTE — TELEPHONE ENCOUNTER
Zoraida Espinoza [facility] Front Office Pool             Caller's first and last name: PT   Reason for call: PT states that she wants a covid test but wants the short stick not the long stick for the test.   Callback required yes/no and why: Yes, wants a call back to see if this can be done   Best contact number(s):[phone]   Details to clarify the request: n/a     Copy/Paste  ENVERA

## 2020-12-22 NOTE — TELEPHONE ENCOUNTER
Identified patient 2 identifiers verified. Spoke to patient gave contact information to [facility] for COVID testing.

## 2020-12-23 RX ORDER — METHOCARBAMOL 750 MG/1
TABLET, FILM COATED ORAL
Qty: 60 TAB | Refills: 1 | Status: SHIPPED | OUTPATIENT
Start: 2020-12-23 | End: 2021-04-05 | Stop reason: DRUGHIGH

## 2021-01-03 DIAGNOSIS — I10 ESSENTIAL HYPERTENSION: ICD-10-CM

## 2021-01-04 RX ORDER — LOSARTAN POTASSIUM 100 MG/1
TABLET ORAL
Qty: 90 TAB | Refills: 3 | Status: SHIPPED | OUTPATIENT
Start: 2021-01-04 | End: 2021-12-29

## 2021-01-07 ENCOUNTER — TELEPHONE (OUTPATIENT)
Dept: INTERNAL MEDICINE CLINIC | Age: 71
End: 2021-01-07

## 2021-01-07 NOTE — TELEPHONE ENCOUNTER
----- Message from Perry Painter sent at 1/7/2021  9:38 AM EST -----  Regarding: Dr. Wallace Hearing telephone  Caller's first and last name: N/A  Reason for call: Wants to know if  completed the forms  Callback required yes/no and why: yes  Best contact number(s): [phone]  Details to clarify the request: NO    Message from Lauren Gilliam

## 2021-01-11 NOTE — TELEPHONE ENCOUNTER
Identified patient 2 identifiers verified. Patient last visit was in August 2020/ Patient accepted an appointment  With Dr. Isis Zhong  On 1/15/20 at 10:00 am for form completion.

## 2021-01-11 NOTE — TELEPHONE ENCOUNTER
Patient states she needs a call back to get the status of DMV forms being completed that patient states she dropped off here at the office that she needs to have back to [facility] no later than 1/31/21. Please call to update.  Thank you

## 2021-01-14 DIAGNOSIS — M54.9 BACK PAIN, UNSPECIFIED BACK LOCATION, UNSPECIFIED BACK PAIN LATERALITY, UNSPECIFIED CHRONICITY: ICD-10-CM

## 2021-01-18 RX ORDER — MELOXICAM 15 MG/1
15 TABLET ORAL DAILY
Qty: 90 TAB | Refills: 3 | Status: SHIPPED | OUTPATIENT
Start: 2021-01-18 | End: 2022-02-01

## 2021-01-20 ENCOUNTER — TELEPHONE (OUTPATIENT)
Dept: INTERNAL MEDICINE CLINIC | Age: 71
End: 2021-01-20

## 2021-01-20 NOTE — TELEPHONE ENCOUNTER
Called patient and was unable to leave message due to mailbox full. Rescheduled patient's virtual visit with Dr. Susan Alcala from 2/1 at 4:15 PM to 1/28 at 10:30 AM to have patient's DMV forms completed by 1/31. Patient will need to confirm this appointment change.

## 2021-01-20 NOTE — TELEPHONE ENCOUNTER
----- Message from Andris Essex sent at 1/20/2021  2:58 PM EST -----  Regarding: Dr. Jones Dress: [phone]  General Message/Vendor Calls    Caller's first and last name: Pt.      Reason for call: Needing DMV forms by 1/31/21. Callback required yes/no and why: Yes, requested. Best contact number(s):[phone]      Details to clarify the request: She is scheduled an appt on 2/1/21 at 4:15  regarding a form for the DMV. However, she must have the forms turned into the DMV by 1/31/21. Can these forms be sent prior to this appt or can she come in sooner?       Message from [facility]

## 2021-01-22 NOTE — TELEPHONE ENCOUNTER
Identified patient 2 identifiers verified. Patient has an appointment with Dr. Rosalie Rain on 1/28/21.

## 2021-01-29 ENCOUNTER — VIRTUAL VISIT (OUTPATIENT)
Dept: INTERNAL MEDICINE CLINIC | Age: 71
End: 2021-01-29
Payer: COMMERCIAL

## 2021-01-29 DIAGNOSIS — Z02.9 ADMINISTRATIVE ENCOUNTER: Primary | ICD-10-CM

## 2021-01-29 PROCEDURE — 99080 SPECIAL REPORTS OR FORMS: CPT | Performed by: FAMILY MEDICINE

## 2021-01-29 NOTE — PROGRESS NOTES
Steffany Oseguera is a 69 y.o. female who was seen by synchronous (real-time) audio-video technology on 1/29/2021 for Follow-up ([address] [place] [address])    Ms. Nick Grove calls in with request for assistance with a DMV form. She needs this filled out in order for her to continue driving due to her history of seizure. She reports no acute issues or any issues. She reports that she is doing well. She has not had any experience  with a seizure or stroke in the past year. Assessment & Plan:   Diagnoses and all orders for this visit:    1. Administrative encounter    Patient's records were reviewed, specifically her prior [facility] medical report. There is no documentation of any seizure in the past year. I will sign her form today. This will be faxed to the [facility]. Subjective:       Prior to Admission medications    Medication Sig Start Date End Date Taking? Authorizing Provider   meloxicam (MOBIC) 15 mg tablet Take 1 Tab by mouth daily. 1/18/21  Yes Cristiana Purcell MD   losartan (COZAAR) 100 mg tablet TAKE 1 TABLET BY MOUTH EVERY DAY 1/4/21  Yes Cristiana Purcell MD   methocarbamoL (ROBAXIN) 750 mg tablet TAKE 1 TABLET BY MOUTH FOUR TIMES DAILY 12/23/20  Yes Cristiana Purcell MD   trimethoprim-sulfamethoxazole (BACTRIM DS, SEPTRA DS) 160-800 mg per tablet TAKE 1 TABLET BY MOUTH TWICE DAILY FOR 10 DAYS 11/17/20  Yes Cristiana Purcell MD   aspirin delayed-release 81 mg tablet Take  by mouth daily.    Yes Provider, Historical   amLODIPine (NORVASC) 5 mg tablet TAKE 1 TABLET BY MOUTH EVERY DAY 9/15/20  Yes Cristiana Purcell MD   clindamycin (CLEOCIN) 300 mg capsule TAKE 1 CAPSULE BY MOUTH THREE TIMES DAILY FOR 5 DAYS 8/11/20  Yes Cristiana Purcell MD   colchicine (MITIGARE) 0.6 mg capsule TAKE 1 CAPSULE BY MOUTH EVERY DAY 7/9/20  Yes Cristiana Purcell MD   rosuvastatin (CRESTOR) 10 mg tablet TAKE 1 TABLET BY MOUTH DAILY 7/9/20  Yes Cristiana Purcell MD   levETIRAcetam (KEPPRA) 500 mg tablet TAKE 2 TABLETS BY MOUTH TWICE DAILY 7/9/20  Yes Antonio Prasad MD   ondansetron (ZOFRAN ODT) 4 mg disintegrating tablet Take 1 Tab by mouth every eight (8) hours as needed for Nausea or Vomiting. 5/19/20  Yes Antonio Prasad MD   spironolactone (ALDACTONE) 25 mg tablet TAKE 1 TABLET BY MOUTH EVERY DAY 3/16/20  Yes Antonio Prasad MD   diclofenac EC (VOLTAREN) 75 mg EC tablet TAKE 1 TABLET(75 MG) BY MOUTH TWICE DAILY 1/14/20  Yes Provider, Historical   cyclobenzaprine (FLEXERIL) 10 mg tablet Take 1 Tab by mouth three (3) times daily as needed for Muscle Spasm(s). 11/23/19  Yes Jason Dubois PA-C   lisinopril (PRINIVIL, ZESTRIL) 5 mg tablet Take  by mouth daily. Yes Provider, Historical   latanoprost (XALATAN) 0.005 % ophthalmic solution Apply 1 Drop to eye nightly. 6/14/19  Yes Antonio Prasad MD   lidocaine (LIDODERM) 5 % Apply patch to the affected area for 12 hours a day and remove for 12 hours a day. Patient not taking: Reported on 1/20/2020 11/23/19   Simba Tamayo PA-C         Review of Systems   Constitutional: Negative. HENT: Negative. Eyes: Negative. Respiratory: Negative. Cardiovascular: Negative. Gastrointestinal: Negative. Genitourinary: Negative. Musculoskeletal: Negative. Skin: Negative. Neurological: Negative. Psychiatric/Behavioral: Negative. All other systems reviewed and are negative. Objective:   No flowsheet data found.      [INSTRUCTIONS:  \"[x]\" Indicates a positive item  \"[]\" Indicates a negative item  -- DELETE ALL ITEMS NOT EXAMINED]    Constitutional: [x] Appears well-developed and well-nourished [x] No apparent distress      [] Abnormal -     Mental status: [x] Alert and awake  [x] Oriented to person/place/time [x] Able to follow commands    [] Abnormal -     Eyes:   EOM    [x]  Normal    [] Abnormal -   Sclera  [x]  Normal    [] Abnormal -          Discharge [x]  None visible   [] Abnormal -     HENT: [x] Normocephalic, atraumatic  [] Abnormal -   [x] Mouth/Throat: Mucous membranes are moist    External Ears [x] Normal  [] Abnormal -    Neck: [x] No visualized mass [] Abnormal -     Pulmonary/Chest: [x] Respiratory effort normal   [x] No visualized signs of difficulty breathing or respiratory distress        [] Abnormal -      Musculoskeletal:   [x] Normal gait with no signs of ataxia         [x] Normal range of motion of neck        [] Abnormal -     Neurological:        [x] No Facial Asymmetry (Cranial nerve 7 motor function) (limited exam due to video visit)          [x] No gaze palsy        [] Abnormal -          Skin:        [x] No significant exanthematous lesions or discoloration noted on facial skin         [] Abnormal -            Psychiatric:       [x] Normal Affect [] Abnormal -        [x] No Hallucinations    Other pertinent observable physical exam findings:-        We discussed the expected course, resolution and complications of the diagnosis(es) in detail. Medication risks, benefits, costs, interactions, and alternatives were discussed as indicated. I advised her to contact the office if her condition worsens, changes or fails to improve as anticipated. She expressed understanding with the diagnosis(es) and plan. Linda Ramirez, who was evaluated through a patient-initiated, synchronous (real-time) audio-video encounter, and/or her healthcare decision maker, is aware that it is a billable service, with coverage as determined by her insurance carrier. She provided verbal consent to proceed: Yes, and patient identification was verified. It was conducted pursuant to the emergency declaration under the [facility], [address] [place] [address] authority and the [employer] Act. A caregiver was present when appropriate. Ability to conduct physical exam was limited. I was at home. The patient was at home.       Sonny Mccoy MD

## 2021-02-02 NOTE — TELEPHONE ENCOUNTER
PCP: Imtiaz Webster MD    Last appt: 1/29/2021  Future Appointments   Date Time Provider Olga Lidia Casarez   4/2/2021 11:40 AM Eloisa Jhaveri NP [facility]       Requested Prescriptions     Pending Prescriptions Disp Refills    spironolactone (ALDACTONE) 25 mg tablet 30 Tab 5     Sig: TAKE 1 TABLET BY MOUTH EVERY DAY    rosuvastatin (CRESTOR) 10 mg tablet 30 Tab 5     Sig: TAKE 1 TABLET BY MOUTH DAILY     No visits with results within 3 Month(s) from this visit. Latest known visit with results is:   Office Visit on 10/02/2020   Component Date Value Ref Range Status    Glucose 10/30/2020 87  65 - 99 mg/dL Final    BUN 10/30/2020 12  8 - 27 mg/dL Final    Creatinine 10/30/2020 0.95  0.57 - 1.00 mg/dL Final    GFR est non-AA 10/30/2020 60  >59 mL/min/1.73 Final    GFR est AA 10/30/2020 70  >59 mL/min/1.73 Final    BUN/Creatinine ratio 10/30/2020 13  12 - 28 Final    Sodium 10/30/2020 146* 134 - 144 mmol/L Final    Potassium 10/30/2020 3.7  3.5 - 5.2 mmol/L Final    Chloride 10/30/2020 108* 96 - 106 mmol/L Final    CO2 10/30/2020 26  20 - 29 mmol/L Final    Calcium 10/30/2020 9.3  8.7 - 10.3 mg/dL Final    Protein, total 10/30/2020 7.1  6.0 - 8.5 g/dL Final    Albumin 10/30/2020 3.8  3.7 - 4.7 g/dL Final    GLOBULIN, TOTAL 10/30/2020 3.3  1.5 - 4.5 g/dL Final    A-G Ratio 10/30/2020 1.2  1.2 - 2.2 Final    Bilirubin, total 10/30/2020 0.8  0.0 - 1.2 mg/dL Final    Alk.  phosphatase 10/30/2020 94  39 - 117 IU/L Final    AST (SGOT) 10/30/2020 34  0 - 40 IU/L Final    ALT (SGPT) 10/30/2020 24  0 - 32 IU/L Final    Creatine Kinase,Total 10/30/2020 155  32 - 182 U/L Final    Cholesterol, total 10/30/2020 147  100 - 199 mg/dL Final    Triglyceride 10/30/2020 104  0 - 149 mg/dL Final    HDL Cholesterol 10/30/2020 49  >39 mg/dL Final    VLDL, calculated 10/30/2020 19  5 - 40 mg/dL Final    LDL, calculated 10/30/2020 79  0 - 99 mg/dL Final

## 2021-02-03 RX ORDER — ROSUVASTATIN CALCIUM 10 MG/1
TABLET, COATED ORAL
Qty: 30 TAB | Refills: 5 | Status: SHIPPED | OUTPATIENT
Start: 2021-02-03 | End: 2021-11-04

## 2021-02-03 RX ORDER — SPIRONOLACTONE 25 MG/1
TABLET ORAL
Qty: 30 TAB | Refills: 5 | Status: SHIPPED | OUTPATIENT
Start: 2021-02-03 | End: 2021-08-02

## 2021-02-05 ENCOUNTER — TELEPHONE (OUTPATIENT)
Dept: INTERNAL MEDICINE CLINIC | Age: 71
End: 2021-02-05

## 2021-02-05 NOTE — TELEPHONE ENCOUNTER
Sarah Beth Caruso Office [place]   Phone Number:  [phone] (Call me)             General Message/Vendor Calls     Caller's first and last name:N/A       Reason for call: She needed the forms filled out and sent to [facility] by the end of January. She got a letter stating that they will suspend her license if the paperwork is not sent to them. It has to be in no later than 02/20/21 [phone] is the fax number to [facility]. She will  the paperwork from the office and take herself.        Callback required yes/no and why: Yes with an update       Best contact number(s):[phone]       Details to clarify the request [phone] Help Desk at [address] [place] [address]

## 2021-02-08 NOTE — TELEPHONE ENCOUNTER
#[phone] pt states that she was to have medical forms be filled out and sent to [facility]. Pt states this hasn't happened correctly as she received three letters in the mail on Saturday, 2-6-21 stating they were going to suspend license due to forms not being completed. Pt has an appt today as well at 11:30 am and must have a call back prior to leaving.

## 2021-02-09 NOTE — TELEPHONE ENCOUNTER
----- Message from Bri Steele sent at 2/9/2021  3:45 PM EST -----  Regarding: Dr. Lydia Morrison Message/Vendor Calls    Caller's first and last name: Pt      Reason for call: Patient brought paperwork needed to keep her 's license to Dr. Rolando hSaffer at the hospital in the first week on January. She needed it faxed back to the [facility] by 1/31/21. Advised that Dr. Rolando Shaffer was out of the office and that Dr. Neal Lucas would take care of the paperwork. Patient has been calling since last Friday to try to sort out this issue and has no return calls back. The DMV will suspend her 's license if she does not have the paperwork faxed to them by 2/20/2021. Callback required yes/no and why: yes, clarification      Best contact number(s): [phone] Fax: [phone] [address]       Details to clarify the request: Patient received paperwork in the mail stating that the paperwork was filled out incorrectly and it needs to be fixed as soon as possible. Letter from [address] [place] [address] Blvd, C, and F need to be filled out in their entirely and Send a current list of all medications.  Patient has already done the vision test with an optometrist.   [facility] Address: [address] [place] [address] Road  Drivers License #: [id number]  [facility] Phone number: [phone]      Message from [facility]

## 2021-02-11 NOTE — TELEPHONE ENCOUNTER
I return call to pt, 2 pt identifiers verified; pt informed that Dr Tamara Berkowitz has her form that's pending completion. Pt asked if she could have a copy of completed form.

## 2021-02-16 ENCOUNTER — TELEPHONE (OUTPATIENT)
Dept: INTERNAL MEDICINE CLINIC | Age: 71
End: 2021-02-16

## 2021-02-16 NOTE — TELEPHONE ENCOUNTER
Identified patient 2 identifiers verified. Patient came in office and reviewed DMV forms before they.  of DMV forms given to her for her records and  were faxed to [facility] Copy scanned in to patients chart.

## 2021-02-16 NOTE — TELEPHONE ENCOUNTER
Identified patient 2 identifiers verified. Patient will be in the office today to  DMV forms and to review them before faxing.

## 2021-03-09 DIAGNOSIS — R56.1 POST-TRAUMATIC SEIZURES (HCC): ICD-10-CM

## 2021-03-09 RX ORDER — LEVETIRACETAM 500 MG/1
TABLET ORAL
Qty: 180 TAB | Refills: 1 | Status: SHIPPED | OUTPATIENT
Start: 2021-03-09 | End: 2021-08-31

## 2021-04-05 ENCOUNTER — OFFICE VISIT (OUTPATIENT)
Dept: INTERNAL MEDICINE CLINIC | Age: 71
End: 2021-04-05
Payer: COMMERCIAL

## 2021-04-05 VITALS
WEIGHT: 202 LBS | HEIGHT: 62 IN | TEMPERATURE: 97.3 F | DIASTOLIC BLOOD PRESSURE: 79 MMHG | OXYGEN SATURATION: 97 % | SYSTOLIC BLOOD PRESSURE: 120 MMHG | RESPIRATION RATE: 14 BRPM | HEART RATE: 77 BPM | BODY MASS INDEX: 37.17 KG/M2

## 2021-04-05 DIAGNOSIS — R10.9 PAIN IN ABDOMEN ON PALPATION: ICD-10-CM

## 2021-04-05 DIAGNOSIS — S16.1XXA STRAIN OF NECK MUSCLE, INITIAL ENCOUNTER: ICD-10-CM
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
## 2021-04-05 DIAGNOSIS — R10.31 RIGHT LOWER QUADRANT ABDOMINAL PAIN: ICD-10-CM

## 2021-04-05 DIAGNOSIS — M25.562 ACUTE PAIN OF LEFT KNEE: ICD-10-CM

## 2021-04-05 PROCEDURE — 99214 OFFICE O/P EST MOD 30 MIN: CPT | Performed by: FAMILY MEDICINE

## 2021-04-05 RX ORDER — CYCLOBENZAPRINE HCL 5 MG
5 TABLET ORAL
Qty: 30 TAB | Refills: 0 | Status: SHIPPED | OUTPATIENT
Start: 2021-04-05

## 2021-04-05 RX ORDER — AMLODIPINE BESYLATE 5 MG/1
TABLET ORAL
Qty: 30 TAB | Refills: 5 | Status: SHIPPED | OUTPATIENT
Start: 2021-04-05 | End: 2021-11-04

## 2021-04-05 RX ORDER — METHYLPREDNISOLONE 4 MG/1
TABLET ORAL
Qty: 1 DOSE PACK | Refills: 0 | Status: SHIPPED | OUTPATIENT
Start: 2021-04-05 | End: 2022-02-07 | Stop reason: ALTCHOICE
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
## 2021-04-06 ENCOUNTER — TRANSCRIBE ORDER (OUTPATIENT)
Dept: SCHEDULING | Age: 71
End: 2021-04-06

## 2021-04-06 ENCOUNTER — TELEPHONE (OUTPATIENT)
Dept: INTERNAL MEDICINE CLINIC | Age: 71
End: 2021-04-06

## 2021-04-06 DIAGNOSIS — R10.31 ABDOMINAL PAIN, RIGHT LOWER QUADRANT: ICD-10-CM

## 2021-04-06 DIAGNOSIS — R10.9 ABDOMINAL PAIN: Primary | ICD-10-CM

## 2021-04-06 DIAGNOSIS — Z12.31 SCREENING MAMMOGRAM FOR HIGH-RISK PATIENT: Primary | ICD-10-CM

## 2021-04-06 NOTE — TELEPHONE ENCOUNTER
BS scheduling states the order in system needs to be divided into two orders, signed and put in the system as soon as possible.     Needs to be:    Chest CT w/o contrast  Abd/pelv CT w/o contrast.    Please have physician sign and put in system as two separate orders

## 2021-04-14 ENCOUNTER — HOSPITAL ENCOUNTER (OUTPATIENT)
Dept: CT IMAGING | Age: 71
Discharge: HOME | End: 2021-04-14
Attending: FAMILY MEDICINE
Payer: COMMERCIAL

## 2021-04-14 DIAGNOSIS — R10.9 ABDOMINAL PAIN: ICD-10-CM

## 2021-04-14 DIAGNOSIS — R10.31 ABDOMINAL PAIN, RIGHT LOWER QUADRANT: ICD-10-CM

## 2021-04-14 PROCEDURE — 71250 CT THORAX DX C-: CPT

## 2021-04-14 PROCEDURE — 74176 CT ABD & PELVIS W/O CONTRAST: CPT

## 2021-04-19 NOTE — PROGRESS NOTES
Please inform the patient that the CT of her abdomen and pelvis do not show any signs of acute disease.   Please send her a copy of the report

## 2021-04-23 ENCOUNTER — TELEPHONE (OUTPATIENT)
Dept: INTERNAL MEDICINE CLINIC | Age: 71
End: 2021-04-23

## 2021-04-23 NOTE — TELEPHONE ENCOUNTER
Dr. Jeanie Burgos Patient last seen with Dr. Jeffery Yee 4/5/21states she needs a call back in reference to getting her Test results from tests done on 4/14/21 ordered per Dr. Jeffery Yee. Please call.  Thank you

## 2021-05-18 ENCOUNTER — DOCUMENTATION ONLY (OUTPATIENT)
Dept: INTERNAL MEDICINE CLINIC | Age: 71
End: 2021-05-18

## 2021-05-23 RX ORDER — METHOCARBAMOL 750 MG/1
TABLET, FILM COATED ORAL
Qty: 60 TABLET | Refills: 1 | Status: SHIPPED | OUTPATIENT
Start: 2021-05-23 | End: 2021-08-03 | Stop reason: SDUPTHER

## 2021-05-24 DIAGNOSIS — L03.311 CELLULITIS, ABDOMINAL WALL: ICD-10-CM

## 2021-05-25 RX ORDER — CLINDAMYCIN HYDROCHLORIDE 300 MG/1
CAPSULE ORAL
Qty: 15 CAPSULE | Refills: 0 | Status: SHIPPED | OUTPATIENT
Start: 2021-05-25 | End: 2021-07-28

## 2021-05-27 ENCOUNTER — OFFICE VISIT (OUTPATIENT)
Dept: INTERNAL MEDICINE CLINIC | Age: 71
End: 2021-05-27
Payer: COMMERCIAL

## 2021-05-27 VITALS
RESPIRATION RATE: 12 BRPM | OXYGEN SATURATION: 99 % | WEIGHT: 199 LBS | BODY MASS INDEX: 36.62 KG/M2 | HEART RATE: 89 BPM | HEIGHT: 62 IN | TEMPERATURE: 96.2 F | SYSTOLIC BLOOD PRESSURE: 114 MMHG | DIASTOLIC BLOOD PRESSURE: 67 MMHG

## 2021-05-27 DIAGNOSIS — L03.90 RECURRENT CELLULITIS: ICD-10-CM

## 2021-05-27 DIAGNOSIS — M54.50 MIDLINE LOW BACK PAIN WITHOUT SCIATICA, UNSPECIFIED CHRONICITY: Primary | ICD-10-CM

## 2021-05-27 PROCEDURE — 99213 OFFICE O/P EST LOW 20 MIN: CPT | Performed by: INTERNAL MEDICINE

## 2021-05-27 RX ORDER — LIDOCAINE 40 MG/G
CREAM TOPICAL
Qty: 15 G | Refills: 5 | Status: SHIPPED | OUTPATIENT
Start: 2021-05-27
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
## 2021-07-27 DIAGNOSIS — L03.311 CELLULITIS, ABDOMINAL WALL: ICD-10-CM

## 2021-07-28 RX ORDER — CLINDAMYCIN HYDROCHLORIDE 300 MG/1
CAPSULE ORAL
Qty: 15 CAPSULE | Refills: 0 | Status: SHIPPED | OUTPATIENT
Start: 2021-07-28 | End: 2022-02-07 | Stop reason: SDUPTHER

## 2021-08-02 RX ORDER — SPIRONOLACTONE 25 MG/1
TABLET ORAL
Qty: 30 TABLET | Refills: 5 | Status: SHIPPED | OUTPATIENT
Start: 2021-08-02 | End: 2022-01-28

## 2021-08-02 RX ORDER — SPIRONOLACTONE 25 MG/1
TABLET ORAL
Qty: 30 TABLET | Refills: 5 | Status: SHIPPED | OUTPATIENT
Start: 2021-08-02 | End: 2022-07-15 | Stop reason: SDUPTHER

## 2021-08-03 RX ORDER — METHOCARBAMOL 750 MG/1
TABLET, FILM COATED ORAL
Qty: 60 TABLET | Refills: 1 | Status: SHIPPED | OUTPATIENT
Start: 2021-08-03 | End: 2021-11-28

## 2021-08-03 RX ORDER — TRIAMCINOLONE ACETONIDE 1 MG/G
CREAM TOPICAL 2 TIMES DAILY
Qty: 15 G | Refills: 0 | Status: SHIPPED | OUTPATIENT
Start: 2021-08-03 | End: 2021-08-26

## 2021-08-03 RX ORDER — MOMETASONE FUROATE 1 MG/G
CREAM TOPICAL DAILY
Qty: 15 G | Refills: 0 | Status: SHIPPED | OUTPATIENT
Start: 2021-08-03 | End: 2022-01-31

## 2021-08-03 NOTE — TELEPHONE ENCOUNTER
Future Appointments:  Future Appointments   Date Time Provider Olga Lidia Casarez   8/30/2021  2:20 PM [address]        Last Appointment With Me:  5/27/2021     Requested Prescriptions     Pending Prescriptions Disp Refills    methocarbamoL (ROBAXIN) 750 mg tablet 60 Tablet 1     Sig: TAKE 1 TABLET BY MOUTH FOUR TIMES DAILY    mometasone (ELOCON) 0.1 % topical cream 15 g 0     Sig: Apply  to affected area daily.  triamcinolone acetonide (KENALOG) 0.1 % topical cream 15 g 0     Sig: Apply  to affected area two (2) times a day.  use thin layer

## 2021-08-03 NOTE — TELEPHONE ENCOUNTER
----- Message from Tiffanie Monaco sent at 8/3/2021 10:00 AM EDT -----  Regarding: Dr. Eladio Preciado: [phone]  Medication Refill    Caller (if not patient): Pt.      Relationship of caller (if not patient): N/a. Best contact number(s): [phone]      Name of medication and dosage if known: \"Mometasone Furoate\" 0.1%, \"Triamcinolone\" 15 grams, \"Methocarbamol\" 750 mg. Is patient out of this medication (yes/no): Yes. Pharmacy name: [facility]. Pharmacy listed in chart? (yes/no): Yes. Pharmacy phone number: [phone]      Details to clarify the request: N/a.       Tiffanie Monaco

## 2021-08-26 RX ORDER — TRIAMCINOLONE ACETONIDE 1 MG/G
CREAM TOPICAL
Qty: 15 G | Refills: 0 | Status: SHIPPED | OUTPATIENT
Start: 2021-08-26 | End: 2021-09-30

## 2021-08-30 ENCOUNTER — HOSPITAL ENCOUNTER (OUTPATIENT)
Dept: MAMMOGRAPHY | Age: 71
Discharge: HOME | End: 2021-08-30
Attending: INTERNAL MEDICINE
Payer: COMMERCIAL

## 2021-08-30 DIAGNOSIS — Z12.31 SCREENING MAMMOGRAM FOR HIGH-RISK PATIENT: ICD-10-CM

## 2021-08-30 PROCEDURE — 77067 SCR MAMMO BI INCL CAD: CPT

## 2021-08-31 DIAGNOSIS — R56.1 POST-TRAUMATIC SEIZURES (HCC): ICD-10-CM

## 2021-08-31 RX ORDER — LEVETIRACETAM 500 MG/1
TABLET ORAL
Qty: 180 TABLET | Refills: 1 | Status: SHIPPED | OUTPATIENT
Start: 2021-08-31 | End: 2022-02-28

## 2021-09-14 ENCOUNTER — TELEPHONE (OUTPATIENT)
Dept: INTERNAL MEDICINE CLINIC | Age: 71
End: 2021-09-14

## 2021-09-14 NOTE — TELEPHONE ENCOUNTER
Since she has been vaccinated, it is to be hoped that her clinical course will be milder than it otherwise would have been. Stay home/quarantine for 10 d starting at the onset of symptoms. Symptomatic care is recommended: Rest, fluids, OTC tylenol. She should consider taking Vit C and Zinc supplements. Get a pulse oximeter, and watch saturation. Go to ER if O2 sat is less than 90%. Go to ER if Fever greater than 101.5, HR greater than 125, BP less than 90/50.    BJF

## 2021-09-14 NOTE — TELEPHONE ENCOUNTER
Identified patient 2 identifiers verified. Patient tested positive for COVID. Dry cough hurting chest from coughing too much.  No fever or chills, no loss of smell or taste,  No N/V.

## 2021-09-14 NOTE — TELEPHONE ENCOUNTER
Patient states she needs a Call back from Dr. Rolando Dahl. Patient very upset that she went to [facility]. Saturday to be tested for COVID as patient reports she was sick with Chest congestion & Diarrhea & was getting too weak to take care of her . Patient states she got Test results back today & she Is COVID Positive even though she & her  Both had Both of their [place] Vaccinations. Patient states she needs to speak with Dr. Rolando Dahl about Plan of Care & if anything can be Prescribed to help. Please call.  Thank you      Patient also states she wants to make Dr. Rolando Dahl aware that her , also Dr. Rolando Dahl patient is in [facility] but states is not for COVID

## 2021-09-14 NOTE — TELEPHONE ENCOUNTER
Identified patient 2 identifiers verified. Patient aware of Dr. Quinteros Rolls instructions, and reports she is doing fluids and rest and Tylenol.

## 2021-09-29 NOTE — TELEPHONE ENCOUNTER
Identified patient 2 identifiers verified. Patient is negative from [facility] and doing well.   Wants to know if Dr. Chelle Sr will take two of her friends that are moving here as patient's

## 2021-09-30 RX ORDER — TRIAMCINOLONE ACETONIDE 1 MG/G
CREAM TOPICAL
Qty: 15 G | Refills: 0 | Status: SHIPPED | OUTPATIENT
Start: 2021-09-30 | End: 2022-01-14

## 2021-09-30 RX ORDER — LATANOPROST 50 UG/ML
1 SOLUTION/ DROPS OPHTHALMIC
Qty: 1 EACH | Refills: 5 | Status: SHIPPED | OUTPATIENT
Start: 2021-09-30

## 2021-10-18 NOTE — PROGRESS NOTES
1. Have you been to the ER, urgent care clinic since your last visit? Hospitalized since your last visit? no  2. Have you seen or consulted any other health care providers outside of the [address] [place] [address] since your last visit? Include any pap smears or colon screening.   Ortho Detail Level: Zone Photo Preface (Leave Blank If You Do Not Want): Photographs were obtained today

## 2021-11-04 RX ORDER — AMLODIPINE BESYLATE 5 MG/1
TABLET ORAL
Qty: 30 TABLET | Refills: 5 | Status: SHIPPED | OUTPATIENT
Start: 2021-11-04 | End: 2022-04-17

## 2021-11-04 RX ORDER — ROSUVASTATIN CALCIUM 10 MG/1
TABLET, COATED ORAL
Qty: 30 TABLET | Refills: 5 | Status: SHIPPED | OUTPATIENT
Start: 2021-11-04 | End: 2022-04-17

## 2021-11-28 RX ORDER — METHOCARBAMOL 750 MG/1
TABLET, FILM COATED ORAL
Qty: 60 TABLET | Refills: 1 | Status: SHIPPED | OUTPATIENT
Start: 2021-11-28

## 2021-12-29 DIAGNOSIS — I10 ESSENTIAL HYPERTENSION: ICD-10-CM

## 2021-12-29 RX ORDER — LOSARTAN POTASSIUM 100 MG/1
TABLET ORAL
Qty: 90 TABLET | Refills: 3 | Status: SHIPPED | OUTPATIENT
Start: 2021-12-29

## 2022-01-10 ENCOUNTER — TELEPHONE (OUTPATIENT)
Dept: INTERNAL MEDICINE CLINIC | Age: 72
End: 2022-01-10

## 2022-01-10 NOTE — TELEPHONE ENCOUNTER
Patient dropped off DMV forms in an envelope for Dr. Justin Merritt to complete. Placed in Dr. Monty Forrest. Patient scheduled for a virtual visit with Dr. Justin Merritt on 1/14/22.

## 2022-01-14 RX ORDER — TRIAMCINOLONE ACETONIDE 1 MG/G
CREAM TOPICAL
Qty: 15 G | Refills: 0 | Status: SHIPPED | OUTPATIENT
Start: 2022-01-14 | End: 2022-02-07

## 2022-01-28 RX ORDER — SPIRONOLACTONE 25 MG/1
TABLET ORAL
Qty: 30 TABLET | Refills: 5 | Status: SHIPPED | OUTPATIENT
Start: 2022-01-28 | End: 2022-07-15 | Stop reason: SDUPTHER

## 2022-01-31 DIAGNOSIS — M54.9 BACK PAIN, UNSPECIFIED BACK LOCATION, UNSPECIFIED BACK PAIN LATERALITY, UNSPECIFIED CHRONICITY: ICD-10-CM

## 2022-01-31 RX ORDER — MOMETASONE FUROATE 1 MG/G
CREAM TOPICAL
Qty: 15 G | Refills: 0 | Status: SHIPPED | OUTPATIENT
Start: 2022-01-31 | End: 2022-02-07 | Stop reason: SDUPTHER

## 2022-02-01 RX ORDER — MELOXICAM 15 MG/1
TABLET ORAL
Qty: 90 TABLET | Refills: 3 | Status: SHIPPED | OUTPATIENT
Start: 2022-02-01

## 2022-02-07 ENCOUNTER — OFFICE VISIT (OUTPATIENT)
Dept: INTERNAL MEDICINE CLINIC | Age: 72
End: 2022-02-07
Payer: COMMERCIAL

## 2022-02-07 VITALS
HEIGHT: 62 IN | RESPIRATION RATE: 18 BRPM | BODY MASS INDEX: 33.86 KG/M2 | WEIGHT: 184 LBS | TEMPERATURE: 97.3 F | DIASTOLIC BLOOD PRESSURE: 77 MMHG | SYSTOLIC BLOOD PRESSURE: 170 MMHG | OXYGEN SATURATION: 99 % | HEART RATE: 75 BPM

## 2022-02-07 DIAGNOSIS — M65.341 TRIGGER RING FINGER OF RIGHT HAND: ICD-10-CM

## 2022-02-07 DIAGNOSIS — B36.0 TINEA VERSICOLOR: Primary | ICD-10-CM

## 2022-02-07 DIAGNOSIS — L03.311 CELLULITIS OF ABDOMINAL WALL: ICD-10-CM

## 2022-02-07 DIAGNOSIS — L03.311 CELLULITIS, ABDOMINAL WALL: ICD-10-CM

## 2022-02-07 PROCEDURE — 99213 OFFICE O/P EST LOW 20 MIN: CPT | Performed by: INTERNAL MEDICINE

## 2022-02-07 RX ORDER — CLINDAMYCIN HYDROCHLORIDE 300 MG/1
CAPSULE ORAL
Qty: 21 CAPSULE | Refills: 0 | Status: SHIPPED | OUTPATIENT
Start: 2022-02-07

## 2022-02-07 RX ORDER — TRIAMCINOLONE ACETONIDE 1 MG/G
CREAM TOPICAL
Qty: 15 G | Refills: 0 | Status: CANCELLED | OUTPATIENT
Start: 2022-02-07

## 2022-02-07 RX ORDER — IBUPROFEN 600 MG/1
TABLET ORAL
COMMUNITY
Start: 2022-02-04

## 2022-02-07 RX ORDER — FLUCONAZOLE 200 MG/1
400 TABLET ORAL ONCE
Qty: 2 TABLET | Refills: 0 | Status: SHIPPED | OUTPATIENT
Start: 2022-02-07 | End: 2022-02-07

## 2022-02-07 RX ORDER — TRAMADOL HYDROCHLORIDE AND ACETAMINOPHEN 37.5; 325 MG/1; MG/1
1 TABLET ORAL
COMMUNITY
Start: 2022-02-04

## 2022-02-07 RX ORDER — MOMETASONE FUROATE 1 MG/G
CREAM TOPICAL
Qty: 45 G | Refills: 0 | Status: SHIPPED | OUTPATIENT
Start: 2022-02-07 | End: 2022-03-10

## 2022-02-07 NOTE — PROGRESS NOTES
Chief Complaint   Patient presents with    Rash     bilateral ankle       1. Have you been to the ER, urgent care clinic since your last visit? Hospitalized since your last visit? No    2. Have you seen or consulted any other health care providers outside of the [address] [place] [address] since your last visit? Include any pap smears or colon screening. No    Pt c/o rash on bilateral ankle, both arm and chest that is itchy x1 month.

## 2022-02-07 NOTE — PROGRESS NOTES
PROGRESS NOTE  Name: Audrey Nichols   : 1950       ASSESSMENT/ PLAN:       Rash, possibly Tinea versicolor:   -     REFERRAL TO DERMATOLOGY  -     mometasone (ELOCON) 0.1 % topical cream; APPLY TOPICALLY TO THE AFFECTED AREA DAILY, Normal, Disp-45 g, R-0  -     fluconazole (DIFLUCAN) 200 mg tablet; Take 2 Tablets by mouth once for 1 dose. FDA advises cautious prescribing of oral fluconazole in pregnancy. , Normal, Disp-2 Tablet, R-0    Trigger ring finger of right hand  -     REFERRAL TO ORTHOPEDICS--Dr. Yaya Stone. BP high today; had been normal. ? Anxiety. Follow-up and Dispositions  ·   Return in about 6 months (around 2022) for HTN. I have reviewed the patient's medications and risks/side effects/benefits were discussed. Diagnosis(-es) explained to patient and questions answered. Literature provided where appropriate. SUBJECTIVE  Ms. Audrey Nichols presents today acutely for     Chief Complaint   Patient presents with    Rash     bilateral ankle     She has a rash, worst on R distal LE, but also present on contralateral foot and both hands, also overlying sternum. She also has complaint of pain R hand and R ring finger locking in flexed position. She also has been having recurrences of cellulitis in abdomen, in panniculus. She is planning surgery at some point; Elisabeth Chou are going to cut that off.\"     OBJECTIVE  Visit Vitals  BP (!) 170/77 (BP 1 Location: Right arm, BP Patient Position: Sitting, BP Cuff Size: Small adult)   Pulse 75   Temp 97.3 °F (36.3 °C) (Temporal)   Resp 18   Ht 5' 2\" (1.575 m)   Wt 184 lb (83.5 kg)   SpO2 99%   BMI 33.65 kg/m²     Gen: Pleasant 66 y.o.  female in NAD. HEENT: PERRLA. EOMI. OP moist and pink. Neck: Supple. No LAD. HEART: RRR, No M/G/R.   LUNGS: CTAB No W/R. ABDOMEN: S, NT, ND, BS+. EXTREMITIES: Warm. No C/C/E. MUSCULOSKELETAL: Normal ROM, muscle strength 5/5 all groups. NEURO: Alert and oriented x 3.   Cranial nerves grossly intact. No focal sensory or motor deficits noted. SKIN: Warm. Dry. No rashes or other lesions noted.

## 2022-02-27 DIAGNOSIS — R56.1 POST-TRAUMATIC SEIZURES (HCC): ICD-10-CM

## 2022-02-28 RX ORDER — LEVETIRACETAM 500 MG/1
TABLET ORAL
Qty: 180 TABLET | Refills: 1 | Status: SHIPPED | OUTPATIENT
Start: 2022-02-28 | End: 2022-07-25 | Stop reason: SDUPTHER

## 2022-03-10 RX ORDER — MOMETASONE FUROATE 1 MG/G
CREAM TOPICAL
Qty: 45 G | Refills: 0 | Status: SHIPPED | OUTPATIENT
Start: 2022-03-10 | End: 2022-03-14

## 2022-03-14 RX ORDER — MOMETASONE FUROATE 1 MG/G
CREAM TOPICAL
Qty: 15 G | Refills: 1 | Status: SHIPPED | OUTPATIENT
Start: 2022-03-14 | End: 2022-04-21

## 2022-03-18 PROBLEM — I25.2 MYOCARDIAL INFARCT, OLD: Status: ACTIVE | Noted: 2020-01-19

## 2022-03-18 PROBLEM — E66.01 OBESITY, MORBID (HCC): Status: ACTIVE | Noted: 2018-05-15

## 2022-03-19 PROBLEM — R09.89 RIGHT CAROTID BRUIT: Status: ACTIVE | Noted: 2019-03-20

## 2022-03-19 PROBLEM — I65.23 BILATERAL CAROTID ARTERY STENOSIS: Status: ACTIVE | Noted: 2019-03-20

## 2022-03-20 PROBLEM — G47.62 NOCTURNAL LEG CRAMPS: Status: ACTIVE | Noted: 2019-07-02

## 2022-04-17 RX ORDER — ROSUVASTATIN CALCIUM 10 MG/1
TABLET, COATED ORAL
Qty: 30 TABLET | Refills: 5 | Status: SHIPPED | OUTPATIENT
Start: 2022-04-17 | End: 2022-09-27 | Stop reason: SDUPTHER

## 2022-04-17 RX ORDER — AMLODIPINE BESYLATE 5 MG/1
TABLET ORAL
Qty: 30 TABLET | Refills: 5 | Status: SHIPPED | OUTPATIENT
Start: 2022-04-17 | End: 2022-09-27 | Stop reason: SDUPTHER

## 2022-04-17 RX ORDER — AMLODIPINE BESYLATE 5 MG/1
TABLET ORAL
Qty: 30 TABLET | Refills: 5 | Status: SHIPPED | OUTPATIENT
Start: 2022-04-17 | End: 2022-10-13 | Stop reason: SDUPTHER

## 2022-04-17 RX ORDER — ROSUVASTATIN CALCIUM 10 MG/1
TABLET, COATED ORAL
Qty: 30 TABLET | Refills: 5 | Status: SHIPPED | OUTPATIENT
Start: 2022-04-17 | End: 2022-10-13 | Stop reason: SDUPTHER

## 2022-04-21 RX ORDER — MOMETASONE FUROATE 1 MG/G
CREAM TOPICAL
Qty: 45 G | Refills: 3 | Status: SHIPPED | OUTPATIENT
Start: 2022-04-21

## 2022-07-15 ENCOUNTER — DOCUMENTATION ONLY (OUTPATIENT)
Dept: INTERNAL MEDICINE CLINIC | Age: 72
End: 2022-07-15

## 2022-07-15 RX ORDER — SPIRONOLACTONE 25 MG/1
25 TABLET ORAL DAILY
Qty: 30 TABLET | Refills: 5 | Status: SHIPPED | OUTPATIENT
Start: 2022-07-15 | End: 2022-09-27

## 2022-07-15 RX ORDER — SPIRONOLACTONE 25 MG/1
25 TABLET ORAL DAILY
Qty: 30 TABLET | Refills: 5 | Status: SHIPPED | OUTPATIENT
Start: 2022-07-15

## 2022-07-15 NOTE — PROGRESS NOTES
Patient presents to the office to drop off  form for Mansoor Strickland MD. Patient advised of 7-10 business day turnaround time for form completion & may incur a fee of $25.00. This has been fully explained to the patient, who indicates understanding. Pt would like a phone call when completed and to  a copy of completed form.

## 2022-07-25 DIAGNOSIS — R56.1 POST-TRAUMATIC SEIZURES (HCC): ICD-10-CM

## 2022-07-26 RX ORDER — LEVETIRACETAM 500 MG/1
500 TABLET ORAL 2 TIMES DAILY
Qty: 180 TABLET | Refills: 1 | Status: SHIPPED | OUTPATIENT
Start: 2022-07-26

## 2022-08-09 ENCOUNTER — TRANSCRIBE ORDER (OUTPATIENT)
Dept: SCHEDULING | Age: 72
End: 2022-08-09

## 2022-08-09 DIAGNOSIS — Z12.31 VISIT FOR SCREENING MAMMOGRAM: Primary | ICD-10-CM

## 2022-09-14 ENCOUNTER — HOSPITAL ENCOUNTER (OUTPATIENT)
Dept: MAMMOGRAPHY | Age: 72
Discharge: HOME | End: 2022-09-14
Attending: INTERNAL MEDICINE
Payer: COMMERCIAL

## 2022-09-14 DIAGNOSIS — Z12.31 VISIT FOR SCREENING MAMMOGRAM: ICD-10-CM

## 2022-09-14 PROCEDURE — 77067 SCR MAMMO BI INCL CAD: CPT

## 2022-09-27 ENCOUNTER — OFFICE VISIT (OUTPATIENT)
Dept: INTERNAL MEDICINE CLINIC | Age: 72
End: 2022-09-27
Payer: COMMERCIAL

## 2022-09-27 VITALS
HEIGHT: 62 IN | WEIGHT: 185 LBS | RESPIRATION RATE: 16 BRPM | HEART RATE: 70 BPM | TEMPERATURE: 97.4 F | BODY MASS INDEX: 34.04 KG/M2 | SYSTOLIC BLOOD PRESSURE: 134 MMHG | DIASTOLIC BLOOD PRESSURE: 83 MMHG | OXYGEN SATURATION: 97 %

## 2022-09-27 DIAGNOSIS — K75.81 NASH (NONALCOHOLIC STEATOHEPATITIS): ICD-10-CM

## 2022-09-27 DIAGNOSIS — R21 RASH: ICD-10-CM

## 2022-09-27 DIAGNOSIS — I63.9 CEREBROVASCULAR ACCIDENT (CVA), UNSPECIFIED MECHANISM (HCC): ICD-10-CM

## 2022-09-27 DIAGNOSIS — M10.9 GOUT, UNSPECIFIED CAUSE, UNSPECIFIED CHRONICITY, UNSPECIFIED SITE: ICD-10-CM

## 2022-09-27 DIAGNOSIS — E78.2 MIXED HYPERLIPIDEMIA: ICD-10-CM

## 2022-09-27 DIAGNOSIS — R56.9 SEIZURE (HCC): ICD-10-CM

## 2022-09-27 DIAGNOSIS — I10 PRIMARY HYPERTENSION: Primary | ICD-10-CM

## 2022-09-27 DIAGNOSIS — I25.10 CORONARY ARTERY DISEASE INVOLVING NATIVE CORONARY ARTERY OF NATIVE HEART WITHOUT ANGINA PECTORIS: ICD-10-CM

## 2022-09-27 PROCEDURE — 99214 OFFICE O/P EST MOD 30 MIN: CPT | Performed by: INTERNAL MEDICINE
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
## 2022-09-28 LAB
ALBUMIN SERPL-MCNC: 3.3 G/DL (ref 3.5–5)
ALBUMIN/GLOB SERPL: 0.8 {RATIO} (ref 1.1–2.2)
ALP SERPL-CCNC: 83 U/L (ref 45–117)
ALT SERPL-CCNC: 26 U/L (ref 12–78)
ANION GAP SERPL CALC-SCNC: 7 MMOL/L (ref 5–15)
AST SERPL-CCNC: 30 U/L (ref 15–37)
BASOPHILS # BLD: 0 K/UL (ref 0–0.1)
BASOPHILS NFR BLD: 1 % (ref 0–1)
BILIRUB SERPL-MCNC: 0.7 MG/DL (ref 0.2–1)
BUN SERPL-MCNC: 15 MG/DL (ref 6–20)
BUN/CREAT SERPL: 16 (ref 12–20)
CALCIUM SERPL-MCNC: 9 MG/DL (ref 8.5–10.1)
CHLORIDE SERPL-SCNC: 110 MMOL/L (ref 97–108)
CHOLEST SERPL-MCNC: 165 MG/DL
CO2 SERPL-SCNC: 26 MMOL/L (ref 21–32)
CREAT SERPL-MCNC: 0.96 MG/DL (ref 0.55–1.02)
DIFFERENTIAL METHOD BLD: ABNORMAL
EOSINOPHIL # BLD: 0.1 K/UL (ref 0–0.4)
EOSINOPHIL NFR BLD: 3 % (ref 0–7)
ERYTHROCYTE [DISTWIDTH] IN BLOOD BY AUTOMATED COUNT: 13.2 % (ref 11.5–14.5)
GLOBULIN SER CALC-MCNC: 3.9 G/DL (ref 2–4)
GLUCOSE SERPL-MCNC: 94 MG/DL (ref 65–100)
HCT VFR BLD AUTO: 41 % (ref 35–47)
HDLC SERPL-MCNC: 64 MG/DL
HDLC SERPL: 2.6 {RATIO} (ref 0–5)
HGB BLD-MCNC: 13.6 G/DL (ref 11.5–16)
IMM GRANULOCYTES # BLD AUTO: 0 K/UL (ref 0–0.04)
IMM GRANULOCYTES NFR BLD AUTO: 0 % (ref 0–0.5)
LDLC SERPL CALC-MCNC: 87.4 MG/DL (ref 0–100)
LYMPHOCYTES # BLD: 1.2 K/UL (ref 0.8–3.5)
LYMPHOCYTES NFR BLD: 35 % (ref 12–49)
MCH RBC QN AUTO: 31.3 PG (ref 26–34)
MCHC RBC AUTO-ENTMCNC: 33.2 G/DL (ref 30–36.5)
MCV RBC AUTO: 94.5 FL (ref 80–99)
MONOCYTES # BLD: 0.4 K/UL (ref 0–1)
MONOCYTES NFR BLD: 11 % (ref 5–13)
NEUTS SEG # BLD: 1.8 K/UL (ref 1.8–8)
NEUTS SEG NFR BLD: 50 % (ref 32–75)
NRBC # BLD: 0 K/UL (ref 0–0.01)
NRBC BLD-RTO: 0 PER 100 WBC
PLATELET # BLD AUTO: 198 K/UL (ref 150–400)
PMV BLD AUTO: 10.7 FL (ref 8.9–12.9)
POTASSIUM SERPL-SCNC: 3.6 MMOL/L (ref 3.5–5.1)
PROT SERPL-MCNC: 7.2 G/DL (ref 6.4–8.2)
RBC # BLD AUTO: 4.34 M/UL (ref 3.8–5.2)
SODIUM SERPL-SCNC: 143 MMOL/L (ref 136–145)
TRIGL SERPL-MCNC: 68 MG/DL (ref ?–150)
URATE SERPL-MCNC: 5.3 MG/DL (ref 2.6–6)
VLDLC SERPL CALC-MCNC: 13.6 MG/DL
WBC # BLD AUTO: 3.5 K/UL (ref 3.6–11)

## 2022-09-30 LAB — LEVETIRACETAM SERPL-MCNC: 29.3 UG/ML (ref 10–40)

## 2022-10-13 NOTE — TELEPHONE ENCOUNTER
PCP: Ludin Burnett MD    Last appt: 9/27/2022  No future appointments. Requested Prescriptions     Pending Prescriptions Disp Refills    amLODIPine (NORVASC) 5 mg tablet 30 Tablet 5     Sig: Take 1 Tablet by mouth daily. rosuvastatin (CRESTOR) 10 mg tablet 30 Tablet 5     Sig: Take 1 Tablet by mouth daily.

## 2022-10-14 RX ORDER — AMLODIPINE BESYLATE 5 MG/1
5 TABLET ORAL DAILY
Qty: 30 TABLET | Refills: 5 | Status: SHIPPED | OUTPATIENT
Start: 2022-10-14

## 2022-10-14 RX ORDER — ROSUVASTATIN CALCIUM 10 MG/1
10 TABLET, COATED ORAL DAILY
Qty: 30 TABLET | Refills: 5 | Status: SHIPPED | OUTPATIENT
Start: 2022-10-14

## 2022-11-08 NOTE — TELEPHONE ENCOUNTER
Pt is requesting a refill today if possible as there were no more refills for this. Pt advised of 48/72 hour turn around on refills, but would do what we could.       Pt states that she needs a 90 day supply

## 2022-11-09 RX ORDER — LATANOPROST 50 UG/ML
1 SOLUTION/ DROPS OPHTHALMIC
Qty: 1 EACH | Refills: 5 | Status: SHIPPED | OUTPATIENT
Start: 2022-11-09

## 2022-11-09 NOTE — TELEPHONE ENCOUNTER
PCP: Shelli Ribera MD    Last appt: 9/27/2022  No future appointments. Requested Prescriptions     Pending Prescriptions Disp Refills    latanoprost (XALATAN) 0.005 % ophthalmic solution 1 Each 5     Sig: Apply 1 Drop to eye nightly.

## 2022-11-30 DIAGNOSIS — R56.1 POST-TRAUMATIC SEIZURES (HCC): ICD-10-CM

## 2022-11-30 RX ORDER — LEVETIRACETAM 500 MG/1
TABLET ORAL
Qty: 180 TABLET | Refills: 1 | Status: SHIPPED | OUTPATIENT
Start: 2022-11-30

## 2022-12-27 NOTE — TELEPHONE ENCOUNTER
----- Message from Nena Garcia sent at 7/30/2019  9:07 AM EDT -----  Regarding: Dr. Gutierrez Pay: [phone]  Radha, from  [facility] office, requesting  medication list per pt. Please fax to [phone].
Medication list with progress notes faxed to Dr. Kadi Navarrete 's office.
none

## 2023-01-11 DIAGNOSIS — I10 ESSENTIAL HYPERTENSION: ICD-10-CM

## 2023-01-11 DIAGNOSIS — M54.9 BACK PAIN, UNSPECIFIED BACK LOCATION, UNSPECIFIED BACK PAIN LATERALITY, UNSPECIFIED CHRONICITY: ICD-10-CM

## 2023-01-11 RX ORDER — MELOXICAM 15 MG/1
TABLET ORAL
Qty: 90 TABLET | Refills: 3 | Status: SHIPPED | OUTPATIENT
Start: 2023-01-11

## 2023-01-11 RX ORDER — LOSARTAN POTASSIUM 100 MG/1
TABLET ORAL
Qty: 90 TABLET | Refills: 3 | Status: SHIPPED | OUTPATIENT
Start: 2023-01-11

## 2023-04-20 RX ORDER — LATANOPROST 50 UG/ML
SOLUTION/ DROPS OPHTHALMIC
Qty: 2.5 ML | Refills: 5 | Status: SHIPPED | OUTPATIENT
Start: 2023-04-20

## 2023-08-28 DIAGNOSIS — I10 ESSENTIAL (PRIMARY) HYPERTENSION: Primary | ICD-10-CM

## 2023-08-28 RX ORDER — SPIRONOLACTONE 25 MG/1
25 TABLET ORAL DAILY
Qty: 90 TABLET | Refills: 3 | Status: SHIPPED | OUTPATIENT
Start: 2023-08-28

## 2023-08-28 NOTE — TELEPHONE ENCOUNTER
Future Appointments:  No future appointments.      Last Appointment With Me:  9/27/2022     Requested Prescriptions     Pending Prescriptions Disp Refills    spironolactone (ALDACTONE) 25 MG tablet 90 tablet 3     Sig: Take 1 tablet by mouth daily

## 2023-09-25 ENCOUNTER — OFFICE VISIT (OUTPATIENT)
Age: 73
End: 2023-09-25
Payer: COMMERCIAL

## 2023-09-25 VITALS
RESPIRATION RATE: 18 BRPM | HEART RATE: 71 BPM | HEIGHT: 62 IN | WEIGHT: 192 LBS | SYSTOLIC BLOOD PRESSURE: 149 MMHG | OXYGEN SATURATION: 95 % | BODY MASS INDEX: 35.33 KG/M2 | DIASTOLIC BLOOD PRESSURE: 72 MMHG | TEMPERATURE: 97.2 F

## 2023-09-25 DIAGNOSIS — I25.10 ATHEROSCLEROSIS OF NATIVE CORONARY ARTERY OF NATIVE HEART WITHOUT ANGINA PECTORIS: ICD-10-CM

## 2023-09-25 DIAGNOSIS — R21 RASH OF BOTH FEET: ICD-10-CM

## 2023-09-25 DIAGNOSIS — E66.01 SEVERE OBESITY (BMI 35.0-39.9) WITH COMORBIDITY (HCC): ICD-10-CM

## 2023-09-25 DIAGNOSIS — R56.9 SEIZURE (HCC): ICD-10-CM

## 2023-09-25 DIAGNOSIS — E78.2 MIXED HYPERLIPIDEMIA: ICD-10-CM

## 2023-09-25 DIAGNOSIS — I10 ESSENTIAL (PRIMARY) HYPERTENSION: Primary | ICD-10-CM

## 2023-09-25 DIAGNOSIS — K75.81 NASH (NONALCOHOLIC STEATOHEPATITIS): ICD-10-CM

## 2023-09-25 DIAGNOSIS — M10.9 GOUT, UNSPECIFIED CAUSE, UNSPECIFIED CHRONICITY, UNSPECIFIED SITE: ICD-10-CM

## 2023-09-25 PROCEDURE — G8427 DOCREV CUR MEDS BY ELIG CLIN: HCPCS | Performed by: INTERNAL MEDICINE

## 2023-09-25 PROCEDURE — 3077F SYST BP >= 140 MM HG: CPT | Performed by: INTERNAL MEDICINE

## 2023-09-25 PROCEDURE — 1090F PRES/ABSN URINE INCON ASSESS: CPT | Performed by: INTERNAL MEDICINE

## 2023-09-25 PROCEDURE — 99214 OFFICE O/P EST MOD 30 MIN: CPT | Performed by: INTERNAL MEDICINE

## 2023-09-25 PROCEDURE — G8417 CALC BMI ABV UP PARAM F/U: HCPCS | Performed by: INTERNAL MEDICINE

## 2023-09-25 PROCEDURE — 1123F ACP DISCUSS/DSCN MKR DOCD: CPT | Performed by: INTERNAL MEDICINE

## 2023-09-25 PROCEDURE — G8400 PT W/DXA NO RESULTS DOC: HCPCS | Performed by: INTERNAL MEDICINE

## 2023-09-25 PROCEDURE — 3017F COLORECTAL CA SCREEN DOC REV: CPT | Performed by: INTERNAL MEDICINE

## 2023-09-25 PROCEDURE — 1036F TOBACCO NON-USER: CPT | Performed by: INTERNAL MEDICINE

## 2023-09-25 PROCEDURE — 3078F DIAST BP <80 MM HG: CPT | Performed by: INTERNAL MEDICINE

## 2023-09-25 RX ORDER — ROSUVASTATIN CALCIUM 10 MG/1
10 TABLET, COATED ORAL DAILY
Qty: 90 TABLET | Refills: 3 | Status: SHIPPED | OUTPATIENT
Start: 2023-09-25

## 2023-09-25 SDOH — ECONOMIC STABILITY: INCOME INSECURITY: HOW HARD IS IT FOR YOU TO PAY FOR THE VERY BASICS LIKE FOOD, HOUSING, MEDICAL CARE, AND HEATING?: NOT HARD AT ALL

## 2023-09-25 SDOH — ECONOMIC STABILITY: HOUSING INSECURITY
IN THE LAST 12 MONTHS, WAS THERE A TIME WHEN YOU DID NOT HAVE A STEADY PLACE TO SLEEP OR SLEPT IN A SHELTER (INCLUDING NOW)?: NO

## 2023-09-25 SDOH — ECONOMIC STABILITY: FOOD INSECURITY: WITHIN THE PAST 12 MONTHS, YOU WORRIED THAT YOUR FOOD WOULD RUN OUT BEFORE YOU GOT MONEY TO BUY MORE.: NEVER TRUE

## 2023-09-25 SDOH — ECONOMIC STABILITY: FOOD INSECURITY: WITHIN THE PAST 12 MONTHS, THE FOOD YOU BOUGHT JUST DIDN'T LAST AND YOU DIDN'T HAVE MONEY TO GET MORE.: NEVER TRUE

## 2023-09-25 ASSESSMENT — PATIENT HEALTH QUESTIONNAIRE - PHQ9
SUM OF ALL RESPONSES TO PHQ QUESTIONS 1-9: 0
SUM OF ALL RESPONSES TO PHQ QUESTIONS 1-9: 0
SUM OF ALL RESPONSES TO PHQ9 QUESTIONS 1 & 2: 0
2. FEELING DOWN, DEPRESSED OR HOPELESS: 0
1. LITTLE INTEREST OR PLEASURE IN DOING THINGS: 0
SUM OF ALL RESPONSES TO PHQ QUESTIONS 1-9: 0
SUM OF ALL RESPONSES TO PHQ QUESTIONS 1-9: 0

## 2023-09-25 NOTE — PROGRESS NOTES
1. \"Have you been to the ER, urgent care clinic since your last visit? Hospitalized since your last visit? \" No    2. \"Have you seen or consulted any other health care providers outside of the [address] [place] [address] since your last visit? \" No     3. For patients aged 43-73: Has the patient had a colonoscopy / FIT/ Cologuard? Yes - no Care Gap present      If the patient is female:    4. For patients aged 43-66: Has the patient had a mammogram within the past 2 years? NA - based on age or sex      11. For patients aged 21-65: Has the patient had a pap smear?  NA - based on age or sex
26 09/27/2022 12:51 PM    BUN 15 09/27/2022 12:51 PM    GFRAA >60 09/27/2022 12:51 PM    ALT 26 09/27/2022 12:51 PM    GLOB 3.9 09/27/2022 12:51 PM       Lab Results   Component Value Date/Time    CHOL 165 09/27/2022 12:51 PM    HDL 64 09/27/2022 12:51 PM        No results found for: \"HBA1C\"    Lab Results   Component Value Date/Time    WBC 3.5 09/27/2022 12:51 PM    HGB 13.6 09/27/2022 12:51 PM    HCT 41.0 09/27/2022 12:51 PM     09/27/2022 12:51 PM    MCV 94.5 09/27/2022 12:51 PM

## 2023-09-26 LAB
ALBUMIN SERPL-MCNC: 3.5 G/DL (ref 3.5–5)
ALBUMIN/GLOB SERPL: 0.9 (ref 1.1–2.2)
ALP SERPL-CCNC: 98 U/L (ref 45–117)
ALT SERPL-CCNC: 26 U/L (ref 12–78)
ANION GAP SERPL CALC-SCNC: 2 MMOL/L (ref 5–15)
AST SERPL-CCNC: 28 U/L (ref 15–37)
BASOPHILS # BLD: 0 K/UL (ref 0–0.1)
BASOPHILS NFR BLD: 1 % (ref 0–1)
BILIRUB SERPL-MCNC: 0.7 MG/DL (ref 0.2–1)
BUN SERPL-MCNC: 15 MG/DL (ref 6–20)
BUN/CREAT SERPL: 14 (ref 12–20)
CALCIUM SERPL-MCNC: 9.1 MG/DL (ref 8.5–10.1)
CHLORIDE SERPL-SCNC: 112 MMOL/L (ref 97–108)
CHOLEST SERPL-MCNC: 157 MG/DL
CO2 SERPL-SCNC: 26 MMOL/L (ref 21–32)
CREAT SERPL-MCNC: 1.11 MG/DL (ref 0.55–1.02)
DIFFERENTIAL METHOD BLD: ABNORMAL
EOSINOPHIL # BLD: 0.1 K/UL (ref 0–0.4)
EOSINOPHIL NFR BLD: 4 % (ref 0–7)
ERYTHROCYTE [DISTWIDTH] IN BLOOD BY AUTOMATED COUNT: 12.6 % (ref 11.5–14.5)
GLOBULIN SER CALC-MCNC: 3.9 G/DL (ref 2–4)
GLUCOSE SERPL-MCNC: 85 MG/DL (ref 65–100)
HCT VFR BLD AUTO: 40.3 % (ref 35–47)
HDLC SERPL-MCNC: 62 MG/DL
HDLC SERPL: 2.5 (ref 0–5)
HGB BLD-MCNC: 12.9 G/DL (ref 11.5–16)
IMM GRANULOCYTES # BLD AUTO: 0 K/UL (ref 0–0.04)
IMM GRANULOCYTES NFR BLD AUTO: 0 % (ref 0–0.5)
LDLC SERPL CALC-MCNC: 76 MG/DL (ref 0–100)
LYMPHOCYTES # BLD: 1.4 K/UL (ref 0.8–3.5)
LYMPHOCYTES NFR BLD: 40 % (ref 12–49)
MCH RBC QN AUTO: 30.5 PG (ref 26–34)
MCHC RBC AUTO-ENTMCNC: 32 G/DL (ref 30–36.5)
MCV RBC AUTO: 95.3 FL (ref 80–99)
MONOCYTES # BLD: 0.4 K/UL (ref 0–1)
MONOCYTES NFR BLD: 11 % (ref 5–13)
NEUTS SEG # BLD: 1.5 K/UL (ref 1.8–8)
NEUTS SEG NFR BLD: 44 % (ref 32–75)
NRBC # BLD: 0 K/UL (ref 0–0.01)
NRBC BLD-RTO: 0 PER 100 WBC
PLATELET # BLD AUTO: 183 K/UL (ref 150–400)
PMV BLD AUTO: 10.7 FL (ref 8.9–12.9)
POTASSIUM SERPL-SCNC: 3.9 MMOL/L (ref 3.5–5.1)
PROT SERPL-MCNC: 7.4 G/DL (ref 6.4–8.2)
RBC # BLD AUTO: 4.23 M/UL (ref 3.8–5.2)
SODIUM SERPL-SCNC: 140 MMOL/L (ref 136–145)
TRIGL SERPL-MCNC: 95 MG/DL
URATE SERPL-MCNC: 4.5 MG/DL (ref 2.6–6)
VLDLC SERPL CALC-MCNC: 19 MG/DL
WBC # BLD AUTO: 3.4 K/UL (ref 3.6–11)

## 2023-09-28 LAB — LEVETIRACETAM SERPL-MCNC: 44.3 UG/ML (ref 10–40)

## 2023-11-09 DIAGNOSIS — I10 ESSENTIAL (PRIMARY) HYPERTENSION: ICD-10-CM

## 2023-11-09 RX ORDER — SPIRONOLACTONE 25 MG/1
25 TABLET ORAL DAILY
Qty: 90 TABLET | Refills: 3 | Status: SHIPPED | OUTPATIENT
Start: 2023-11-09

## 2023-11-09 RX ORDER — AMLODIPINE BESYLATE 5 MG/1
5 TABLET ORAL DAILY
Qty: 90 TABLET | Refills: 1 | Status: SHIPPED | OUTPATIENT
Start: 2023-11-09

## 2023-11-09 RX ORDER — LEVETIRACETAM 500 MG/1
500 TABLET ORAL 2 TIMES DAILY
Qty: 60 TABLET | Refills: 1 | Status: SHIPPED | OUTPATIENT
Start: 2023-11-09

## 2023-11-09 NOTE — TELEPHONE ENCOUNTER
----- Message from Jeromy Urban sent at 11/9/2023  9:53 AM EST -----  Subject: Refill Request    QUESTIONS  Name of Medication? spironolactone (ALDACTONE) 25 MG tablet  Patient-reported dosage and instructions? 1 in the morning  How many days do you have left? 0  Preferred Pharmacy? [id number]  Pharmacy phone number (if available)? [phone]  ---------------------------------------------------------------------------  --------------,  Name of Medication? amLODIPine (NORVASC) 5 MG tablet  Patient-reported dosage and instructions? 1 a day   How many days do you have left? 0  Preferred Pharmacy? [address] [facility] [place] [id number]  Pharmacy phone number (if available)? [phone]  ---------------------------------------------------------------------------  --------------,  Name of Medication? levETIRAcetam (KEPPRA) 500 MG tablet  Patient-reported dosage and instructions? 1 in the AM and 1 in PM  How many days do you have left? 15  Preferred Pharmacy? "[facility] [id number]  Pharmacy phone number (if available)? [phone]  Additional Information for Provider? Patient is requesting something be   called in for nighttime muscle spasms. ---------------------------------------------------------------------------  --------------  Roni PINEDA  What is the best way for the office to contact you? OK to leave message on   voicemail  Preferred Call Back Phone Number? [phone]  ---------------------------------------------------------------------------  --------------  SCRIPT ANSWERS  Relationship to Patient?  Self

## 2024-01-08 ENCOUNTER — TELEPHONE (OUTPATIENT)
Age: 74
End: 2024-01-08

## 2024-01-08 RX ORDER — LOSARTAN POTASSIUM 100 MG/1
100 TABLET ORAL DAILY
Qty: 90 TABLET | Refills: 3 | Status: CANCELLED | OUTPATIENT
Start: 2024-01-08

## 2024-01-08 NOTE — TELEPHONE ENCOUNTER
Pt states she is having leg pain in both legs behind her calves that also has a rash that itches.   Bottom part of right and left ankle.    Pt wants an appt for this tomorrow.       Pt also has DMV forms to be filled out.  How long of an appt for this I did not know.      Please call to schedule for both as soon as possible.

## 2024-01-08 NOTE — TELEPHONE ENCOUNTER
Future Appointments:  No future appointments.     Last Appointment With Me:  9/25/2023     Requested Prescriptions     Pending Prescriptions Disp Refills    meloxicam (MOBIC) 15 MG tablet 30 tablet 3     Sig: Take 1 tablet by mouth daily    losartan (COZAAR) 100 MG tablet 90 tablet 3     Sig: Take 1 tablet by mouth daily

## 2024-01-09 ENCOUNTER — OFFICE VISIT (OUTPATIENT)
Age: 74
End: 2024-01-09
Payer: COMMERCIAL

## 2024-01-09 VITALS
RESPIRATION RATE: 16 BRPM | BODY MASS INDEX: 33.64 KG/M2 | TEMPERATURE: 97.4 F | SYSTOLIC BLOOD PRESSURE: 109 MMHG | HEIGHT: 62 IN | WEIGHT: 182.8 LBS | HEART RATE: 83 BPM | OXYGEN SATURATION: 95 % | DIASTOLIC BLOOD PRESSURE: 68 MMHG

## 2024-01-09 DIAGNOSIS — R56.9 CONVULSIONS, UNSPECIFIED CONVULSION TYPE (HCC): ICD-10-CM

## 2024-01-09 DIAGNOSIS — I10 ESSENTIAL (PRIMARY) HYPERTENSION: ICD-10-CM

## 2024-01-09 DIAGNOSIS — G40.89 OTHER SEIZURES (HCC): ICD-10-CM

## 2024-01-09 DIAGNOSIS — R56.9 SEIZURE (HCC): Primary | ICD-10-CM

## 2024-01-09 PROCEDURE — G8417 CALC BMI ABV UP PARAM F/U: HCPCS | Performed by: INTERNAL MEDICINE

## 2024-01-09 PROCEDURE — G8427 DOCREV CUR MEDS BY ELIG CLIN: HCPCS | Performed by: INTERNAL MEDICINE

## 2024-01-09 PROCEDURE — 1036F TOBACCO NON-USER: CPT | Performed by: INTERNAL MEDICINE

## 2024-01-09 PROCEDURE — G8400 PT W/DXA NO RESULTS DOC: HCPCS | Performed by: INTERNAL MEDICINE

## 2024-01-09 PROCEDURE — 3074F SYST BP LT 130 MM HG: CPT | Performed by: INTERNAL MEDICINE

## 2024-01-09 PROCEDURE — 1090F PRES/ABSN URINE INCON ASSESS: CPT | Performed by: INTERNAL MEDICINE

## 2024-01-09 PROCEDURE — 99214 OFFICE O/P EST MOD 30 MIN: CPT | Performed by: INTERNAL MEDICINE

## 2024-01-09 PROCEDURE — G8484 FLU IMMUNIZE NO ADMIN: HCPCS | Performed by: INTERNAL MEDICINE

## 2024-01-09 PROCEDURE — 3078F DIAST BP <80 MM HG: CPT | Performed by: INTERNAL MEDICINE

## 2024-01-09 PROCEDURE — 3017F COLORECTAL CA SCREEN DOC REV: CPT | Performed by: INTERNAL MEDICINE

## 2024-01-09 PROCEDURE — 1123F ACP DISCUSS/DSCN MKR DOCD: CPT | Performed by: INTERNAL MEDICINE

## 2024-01-09 RX ORDER — LEVETIRACETAM 500 MG/1
500 TABLET ORAL 2 TIMES DAILY
Qty: 60 TABLET | Refills: 5 | Status: SHIPPED | OUTPATIENT
Start: 2024-01-09

## 2024-01-09 RX ORDER — SPIRONOLACTONE 25 MG/1
25 TABLET ORAL DAILY
Qty: 90 TABLET | Refills: 3 | Status: SHIPPED | OUTPATIENT
Start: 2024-01-09

## 2024-01-09 RX ORDER — MELOXICAM 15 MG/1
15 TABLET ORAL DAILY
Qty: 30 TABLET | Refills: 5 | Status: SHIPPED | OUTPATIENT
Start: 2024-01-09

## 2024-01-09 RX ORDER — ROSUVASTATIN CALCIUM 10 MG/1
10 TABLET, COATED ORAL DAILY
Qty: 90 TABLET | Refills: 3 | Status: SHIPPED | OUTPATIENT
Start: 2024-01-09

## 2024-01-09 RX ORDER — LOSARTAN POTASSIUM 100 MG/1
100 TABLET ORAL DAILY
Qty: 30 TABLET | Refills: 11 | Status: SHIPPED | OUTPATIENT
Start: 2024-01-09 | End: 2024-01-09 | Stop reason: SDUPTHER

## 2024-01-09 RX ORDER — LOSARTAN POTASSIUM 100 MG/1
100 TABLET ORAL DAILY
Qty: 90 TABLET | Refills: 3 | Status: SHIPPED | OUTPATIENT
Start: 2024-01-09

## 2024-01-09 ASSESSMENT — PATIENT HEALTH QUESTIONNAIRE - PHQ9
SUM OF ALL RESPONSES TO PHQ QUESTIONS 1-9: 0
SUM OF ALL RESPONSES TO PHQ9 QUESTIONS 1 & 2: 0
2. FEELING DOWN, DEPRESSED OR HOPELESS: 0
SUM OF ALL RESPONSES TO PHQ QUESTIONS 1-9: 0
1. LITTLE INTEREST OR PLEASURE IN DOING THINGS: 0

## 2024-01-09 NOTE — PROGRESS NOTES
PROGRESS NOTE  Name: Michelle Marroquin   : 1950       ASSESSMENT/ PLAN:     Stroke / Seizure: No recent events. On keppra, but no longer sees Neurology.   Vertigo: No recent events.  Edema: Doing better.  Obesity: s/p lap band.   HTN (hypertension): Fine today.   -       LIPID PANEL  -       METABOLIC PANEL, COMPREHENSIVE  -       CBC WITH AUTOMATED DIFF  HLD (hyperlipidemia): Due for recheck.   -       LIPID PANEL  CUI (nonalcoholic steatohepatitis)  CAD (coronary artery disease): No CP.   Trigger Finger: Per Dr. Van. Doing fine now.   Gout, unspecified cause: No recent flares.   -     URIC ACID  Rash--Stasis dermatitis + some vitiligo: Continue creams.    -     REFERRAL TO DERMATOLOGY    RTC 6 months, HTN    I have reviewed the patient's medications and risks/side effects/benefits were discussed. Diagnosis(-es) explained to patient and questions answered. Literature provided where appropriate.                    SUBJECTIVE:   Ms. Michelle Marroquin is a 73 y.o. female who presents today for follow up.      Chief Complaint   Patient presents with    Leg Pain    Rash       She has a rash on anterior shins, R worse than left. Scaling, redness--\"stings and burns; I can hardly stand it.\" She has been using aquaphor. She has an upcoming appointment with Danitza Khalil.     In the past she has also used petroleum jelly at night, and has tried a mometasone cream, that didn't offer much relief.     Weight: She has had history of lap band, which didn't work well for her.      Wt Readings from Last 3 Encounters:   23 87.1 kg (192 lb)   22 83.9 kg (185 lb)   22 83.5 kg (184 lb)       CAD: She denies CP today. She sees cardiologist at [facility].     She has had no recent seizures or recent new neurologic events. No longer sees neurologist; Previously saw Dr. Medina, then Dr. Mackenzie.  It is recalled that in She was admitted to my service -2015 for seizure likely due to subacute

## 2024-01-09 NOTE — PROGRESS NOTES
1. \"Have you been to the ER, urgent care clinic since your last visit?  Hospitalized since your last visit?\" No    2. \"Have you seen or consulted any other health care providers outside of the [facility] since your last visit?\" No     3. For patients aged 45-75: Has the patient had a colonoscopy / FIT/ Cologuard? No      If the patient is female:    4. For patients aged 40-74: Has the patient had a mammogram within the past 2 years? Yes - no Care Gap present      5. For patients aged 21-65: Has the patient had a pap smear? NA - based on age or sex

## 2024-01-19 ENCOUNTER — CLINICAL DOCUMENTATION (OUTPATIENT)
Age: 74
End: 2024-01-19

## 2024-01-26 ENCOUNTER — TELEPHONE (OUTPATIENT)
Age: 74
End: 2024-01-26

## 2024-01-26 NOTE — TELEPHONE ENCOUNTER
Pt states that two weeks ago she brought in form for handi cap placard.  Is this done?  Did it go to [facility] as they are telling her they have not received.    They need this by 1-31-24 or they will pull her driving privileges.        [facility] Phone #[phone]    Please call pt when this is done AND mail a copy to her.  Thanks.

## 2024-01-29 ENCOUNTER — TELEPHONE (OUTPATIENT)
Age: 74
End: 2024-01-29

## 2024-01-29 NOTE — TELEPHONE ENCOUNTER
I called the patient and let her know  is working on her form.Patient verbalized understanding of information discussed w/ no further questions at this time.

## 2024-01-29 NOTE — TELEPHONE ENCOUNTER
----- Message from Meli Ramachandran sent at 1/26/2024  3:34 PM EST -----  Subject: Message to Provider    QUESTIONS  Information for Provider? Patient would like to have the number of her   orthopedic doctor. Dr. ji.  ---------------------------------------------------------------------------  --------------  CALL BACK INFO  [phone]; OK to leave message on voicemail  ---------------------------------------------------------------------------  --------------  SCRIPT ANSWERS  Relationship to Patient? Self

## 2024-02-07 ENCOUNTER — CLINICAL DOCUMENTATION (OUTPATIENT)
Age: 74
End: 2024-02-07

## 2024-02-07 NOTE — PROGRESS NOTES
DMV form was faxed to [phone], scanned into the patient's chart and the original was mailed to the patient.

## 2024-02-28 ENCOUNTER — OFFICE VISIT (OUTPATIENT)
Age: 74
End: 2024-02-28

## 2024-02-28 VITALS
BODY MASS INDEX: 33.27 KG/M2 | SYSTOLIC BLOOD PRESSURE: 105 MMHG | HEIGHT: 62 IN | TEMPERATURE: 97.9 F | WEIGHT: 180.8 LBS | DIASTOLIC BLOOD PRESSURE: 67 MMHG | RESPIRATION RATE: 12 BRPM | OXYGEN SATURATION: 96 % | HEART RATE: 67 BPM

## 2024-02-28 DIAGNOSIS — R56.9 CONVULSIONS, UNSPECIFIED CONVULSION TYPE (HCC): Primary | ICD-10-CM

## 2024-02-28 NOTE — PROGRESS NOTES
Chief Complaint   Patient presents with    Form completion     DMV form        \"Have you been to the ER, urgent care clinic since your last visit?  Hospitalized since your last visit?\"    NO    “Have you seen or consulted any other health care providers outside of [facility] since your last visit?”    NO    “Have you had a colorectal cancer screening such as a colonoscopy/FIT/Cologuard?    NO

## 2024-02-28 NOTE — PROGRESS NOTES
She presented today for \"completion of DMV form.\"  However, we have already completed the MED3 form.  The information that Ms. Marroquin brought today is communication from [facility] to her that suggests that what [facility] wants is Lien Release Note from [facility], and not anything that involves her physician.     ROSAF

## 2024-03-04 ENCOUNTER — TELEPHONE (OUTPATIENT)
Age: 74
End: 2024-03-04

## 2024-03-04 NOTE — TELEPHONE ENCOUNTER
Patient declined visit with Dr. Johnson. She only wants to see Dr. Hayes. Appt accepted for 3/15.

## 2024-03-04 NOTE — TELEPHONE ENCOUNTER
Pt states she has a cold that keeps coming back on her.    She would like to be seen in office on 3-6 or 3-8     Pt has DMV forms to be filled out as well.    Pt can not come into office with her cold, what is she to do?

## 2024-03-12 ENCOUNTER — TELEPHONE (OUTPATIENT)
Age: 74
End: 2024-03-12

## 2024-03-12 NOTE — TELEPHONE ENCOUNTER
Patient states she needs a call back in reference to her Appt on Friday, 3/15/24 with Dr. Hayes that is Scheduled as a VV appt & patient states she needs in Office & was told this was In office appt. Please call to discuss. Thank you

## 2024-03-12 NOTE — TELEPHONE ENCOUNTER
I called the patient back and let her know her appt is a VV. The patient reported her DMV form needs to be refaxed because DMV needs more info. She stated she will need to write a letter to DMV also. I asked her if she would like to keep the VV on Friday, she said yes, for right now.

## 2024-03-15 ENCOUNTER — TELEMEDICINE (OUTPATIENT)
Age: 74
End: 2024-03-15
Payer: COMMERCIAL

## 2024-03-15 DIAGNOSIS — R56.9 SEIZURE (HCC): Primary | ICD-10-CM

## 2024-03-15 PROCEDURE — G8417 CALC BMI ABV UP PARAM F/U: HCPCS | Performed by: INTERNAL MEDICINE

## 2024-03-15 PROCEDURE — G8484 FLU IMMUNIZE NO ADMIN: HCPCS | Performed by: INTERNAL MEDICINE

## 2024-03-15 PROCEDURE — 3017F COLORECTAL CA SCREEN DOC REV: CPT | Performed by: INTERNAL MEDICINE

## 2024-03-15 PROCEDURE — 1036F TOBACCO NON-USER: CPT | Performed by: INTERNAL MEDICINE

## 2024-03-15 PROCEDURE — 1123F ACP DISCUSS/DSCN MKR DOCD: CPT | Performed by: INTERNAL MEDICINE

## 2024-03-15 PROCEDURE — G8427 DOCREV CUR MEDS BY ELIG CLIN: HCPCS | Performed by: INTERNAL MEDICINE

## 2024-03-15 PROCEDURE — G8400 PT W/DXA NO RESULTS DOC: HCPCS | Performed by: INTERNAL MEDICINE

## 2024-03-15 PROCEDURE — 99213 OFFICE O/P EST LOW 20 MIN: CPT | Performed by: INTERNAL MEDICINE

## 2024-03-15 PROCEDURE — 1090F PRES/ABSN URINE INCON ASSESS: CPT | Performed by: INTERNAL MEDICINE

## 2024-03-15 RX ORDER — LEVETIRACETAM 500 MG/1
500 TABLET ORAL 2 TIMES DAILY
Qty: 60 TABLET | Refills: 5 | Status: SHIPPED | OUTPATIENT
Start: 2024-03-15

## 2024-03-15 NOTE — PROGRESS NOTES
1. \"Have you been to the ER, urgent care clinic since your last visit?  Hospitalized since your last visit?\" No    2. \"Have you seen or consulted any other health care providers outside of the [facility] since your last visit?\" No     3. For patients aged 45-75: Has the patient had a colonoscopy / FIT/ Cologuard? Yes - Care Gap present. Rooming MA/LPN to request most recent results      If the patient is female:    4. For patients aged 40-74: Has the patient had a mammogram within the past 2 years? NA - based on age or sex      5. For patients aged 21-65: Has the patient had a pap smear? NA - based on age or sex

## 2024-03-15 NOTE — PROGRESS NOTES
Michelle Marroquin is a 73 y.o. female who was seen by synchronous (real-time) audio-video technology on 3/15/2024 for Other (DMV forms)        Progress Note         PROGRESS NOTE  Name: Michelle Marroquin   : 1950       ASSESSMENT/ PLAN:     Michelle was seen today for other.    Diagnoses and all orders for this visit:    Seizure (HCC)  -     [facility] - Jorge Delacruz MD, Neurology, [place]  -     levETIRAcetam (KEPPRA) 500 MG tablet; Take 1 tablet by mouth 2 times daily      Follow-up and Dispositions    Return in about 6 months (around 9/15/2024).         I have reviewed the patient's medications and risks/side effects/benefits were discussed. Diagnosis(-es) explained to patient and questions answered. Literature provided where appropriate.                       SUBJECTIVE  Ms. Michelle Marroquin presents today acutely for     Chief Complaint   Patient presents with    Other     DMV forms       She has scheduled this appointment to discuss DMV forms. We have already completed the Med2 form. She was subsequently asked to write a letter in her own writing to state that \"I haven't had any seizures.\" She has done so and mailed this.     It is recalled that she was admitted to my service -2015 for seizure likely due to subacute CVA; Dr. Medina, neurologist, saw her and she was started on keppra.  Dr. Medina planned repeat MRI which was negative for neoplasm. She has not had any seizures since then.     She is neurologically stable today with no new signs or symptoms.     OBJECTIVE  There were no vitals taken for this visit.  Gen: Pleasant 73 y.o.  female in NAD.         Objective:          No data to display                 [INSTRUCTIONS:  \"[x]\" Indicates a positive item  \"[]\" Indicates a negative item  -- DELETE ALL ITEMS NOT EXAMINED]    Constitutional: [x] Appears well-developed and well-nourished [x] No apparent distress      [] Abnormal -     Mental status: [x] Alert and awake  [x] Oriented to person/place/time

## 2024-06-17 RX ORDER — AMLODIPINE BESYLATE 5 MG/1
5 TABLET ORAL DAILY
Qty: 90 TABLET | Refills: 1 | Status: SHIPPED | OUTPATIENT
Start: 2024-06-17

## 2024-06-17 NOTE — TELEPHONE ENCOUNTER
PCP: Dariel Hayes MD    Last appt: 3/15/2024  No future appointments.    Requested Prescriptions     Pending Prescriptions Disp Refills    amLODIPine (NORVASC) 5 MG tablet 90 tablet 1     Sig: Take 1 tablet by mouth daily

## 2024-07-16 ENCOUNTER — TELEPHONE (OUTPATIENT)
Age: 74
End: 2024-07-16

## 2024-07-22 ENCOUNTER — TELEPHONE (OUTPATIENT)
Age: 74
End: 2024-07-22

## 2024-07-22 NOTE — TELEPHONE ENCOUNTER
Pt states she would like to have a f/u appt with Dr. Hayes very soon to her dizziness.  This is f/u to [facility].    Please call to schedule as first available is 8-20-24

## 2024-07-29 ENCOUNTER — OFFICE VISIT (OUTPATIENT)
Age: 74
End: 2024-07-29
Payer: COMMERCIAL

## 2024-07-29 VITALS
RESPIRATION RATE: 16 BRPM | SYSTOLIC BLOOD PRESSURE: 107 MMHG | DIASTOLIC BLOOD PRESSURE: 67 MMHG | OXYGEN SATURATION: 100 % | HEART RATE: 62 BPM | TEMPERATURE: 97.3 F | WEIGHT: 179 LBS | BODY MASS INDEX: 32.94 KG/M2 | HEIGHT: 62 IN

## 2024-07-29 DIAGNOSIS — R56.9 SEIZURE (HCC): ICD-10-CM

## 2024-07-29 DIAGNOSIS — R42 VERTIGO: Primary | ICD-10-CM

## 2024-07-29 DIAGNOSIS — E78.2 MIXED HYPERLIPIDEMIA: ICD-10-CM

## 2024-07-29 DIAGNOSIS — M10.9 GOUT, UNSPECIFIED CAUSE, UNSPECIFIED CHRONICITY, UNSPECIFIED SITE: ICD-10-CM

## 2024-07-29 DIAGNOSIS — I10 ESSENTIAL (PRIMARY) HYPERTENSION: ICD-10-CM

## 2024-07-29 DIAGNOSIS — I25.10 ATHEROSCLEROSIS OF NATIVE CORONARY ARTERY OF NATIVE HEART WITHOUT ANGINA PECTORIS: ICD-10-CM

## 2024-07-29 PROCEDURE — G8400 PT W/DXA NO RESULTS DOC: HCPCS | Performed by: INTERNAL MEDICINE

## 2024-07-29 PROCEDURE — 99214 OFFICE O/P EST MOD 30 MIN: CPT | Performed by: INTERNAL MEDICINE

## 2024-07-29 PROCEDURE — G8427 DOCREV CUR MEDS BY ELIG CLIN: HCPCS | Performed by: INTERNAL MEDICINE

## 2024-07-29 PROCEDURE — G8417 CALC BMI ABV UP PARAM F/U: HCPCS | Performed by: INTERNAL MEDICINE

## 2024-07-29 PROCEDURE — 3017F COLORECTAL CA SCREEN DOC REV: CPT | Performed by: INTERNAL MEDICINE

## 2024-07-29 PROCEDURE — 3074F SYST BP LT 130 MM HG: CPT | Performed by: INTERNAL MEDICINE

## 2024-07-29 PROCEDURE — 1123F ACP DISCUSS/DSCN MKR DOCD: CPT | Performed by: INTERNAL MEDICINE

## 2024-07-29 PROCEDURE — 1090F PRES/ABSN URINE INCON ASSESS: CPT | Performed by: INTERNAL MEDICINE

## 2024-07-29 PROCEDURE — 1036F TOBACCO NON-USER: CPT | Performed by: INTERNAL MEDICINE

## 2024-07-29 PROCEDURE — 3078F DIAST BP <80 MM HG: CPT | Performed by: INTERNAL MEDICINE

## 2024-07-29 RX ORDER — AMLODIPINE BESYLATE 5 MG/1
5 TABLET ORAL DAILY
Qty: 90 TABLET | Refills: 3 | Status: SHIPPED | OUTPATIENT
Start: 2024-07-29

## 2024-07-29 RX ORDER — MECLIZINE HYDROCHLORIDE 25 MG/1
25 TABLET ORAL 3 TIMES DAILY PRN
Qty: 30 TABLET | Refills: 5 | Status: SHIPPED | OUTPATIENT
Start: 2024-07-29

## 2024-07-29 NOTE — PROGRESS NOTES
PROGRESS NOTE  Name: Michelle Marroquin   : 1950       ASSESSMENT/ PLAN:     Vertigo: Recent recurrent episode as noted. Rx for meclizine. Refer ENT.   Stroke / Seizure: No recent events. On keppra, but no longer sees Neurology.   Edema: Doing better.  Obesity: s/p lap band.   HTN (hypertension): Fine today.   -       LIPID PANEL  -       METABOLIC PANEL, COMPREHENSIVE  -       CBC WITH AUTOMATED DIFF  HLD (hyperlipidemia): Due for recheck.   -       LIPID PANEL  CUI (nonalcoholic steatohepatitis)  CAD (coronary artery disease): No CP.   Trigger Finger: Per Dr. Van. Doing fine now.   Gout, unspecified cause: No recent flares.   -     URIC ACID  Rash--Stasis dermatitis + some vitiligo: Continue creams.    -     REFERRAL TO DERMATOLOGY    RTC 6 months, HTN    I have reviewed the patient's medications and risks/side effects/benefits were discussed. Diagnosis(-es) explained to patient and questions answered. Literature provided where appropriate.                    SUBJECTIVE:   Ms. Michelle Marroquin is a 74 y.o. female who presents today for follow up.      Chief Complaint   Patient presents with    Dizziness       Yesterday she was driving home from [place], and developed severe dizziness and had to pull over a couple of times. She experienced vertigo.     She has a stable rash on anterior shins, R worse than left. Scaling, redness--\"stings and burns; I can hardly stand it.\" She has been using aquaphor. She sees dermatologist Danitza Khalil. In the past she has also used petroleum jelly at night, and has tried a mometasone cream, that didn't offer much relief.     Weight: She has had history of lap band, which didn't work well for her.      Wt Readings from Last 3 Encounters:   24 81.2 kg (179 lb)   24 82 kg (180 lb 12.8 oz)   24 82.9 kg (182 lb 12.8 oz)       CAD: She denies CP today. She sees cardiologist at [facility].     She has had no recent seizures or recent new neurologic

## 2024-07-29 NOTE — TELEPHONE ENCOUNTER
PCP: Dariel Hayes MD    Last appt: 3/15/2024  Future Appointments   Date Time Provider Department Center   7/29/2024 11:30 AM Dariel Hayes MD [facility]       Requested Prescriptions     Pending Prescriptions Disp Refills    amLODIPine (NORVASC) 5 MG tablet 90 tablet 1     Sig: Take 1 tablet by mouth daily

## 2024-07-29 NOTE — PROGRESS NOTES
\"Have you been to the ER, urgent care clinic since your last visit?  Hospitalized since your last visit?\"    NO    “Have you seen or consulted any other health care providers outside of [facility] since your last visit?”    NO        “Have you had a colorectal cancer screening such as a colonoscopy/FIT/Cologuard?    NO    No colonoscopy on file  No cologuard on file  No FIT/FOBT on file   No flexible sigmoidoscopy on file         Click Here for Release of Records Request

## 2024-07-30 LAB
ALBUMIN SERPL-MCNC: 3.5 G/DL (ref 3.5–5)
ALBUMIN/GLOB SERPL: 0.9 (ref 1.1–2.2)
ALP SERPL-CCNC: 102 U/L (ref 45–117)
ALT SERPL-CCNC: 23 U/L (ref 12–78)
ANION GAP SERPL CALC-SCNC: 3 MMOL/L (ref 5–15)
AST SERPL-CCNC: 23 U/L (ref 15–37)
BASOPHILS # BLD: 0 K/UL (ref 0–0.1)
BASOPHILS NFR BLD: 1 % (ref 0–1)
BILIRUB SERPL-MCNC: 0.3 MG/DL (ref 0.2–1)
BUN SERPL-MCNC: 23 MG/DL (ref 6–20)
BUN/CREAT SERPL: 15 (ref 12–20)
CALCIUM SERPL-MCNC: 9.1 MG/DL (ref 8.5–10.1)
CHLORIDE SERPL-SCNC: 113 MMOL/L (ref 97–108)
CHOLEST SERPL-MCNC: 152 MG/DL
CO2 SERPL-SCNC: 24 MMOL/L (ref 21–32)
CREAT SERPL-MCNC: 1.56 MG/DL (ref 0.55–1.02)
DIFFERENTIAL METHOD BLD: NORMAL
EOSINOPHIL # BLD: 0.1 K/UL (ref 0–0.4)
EOSINOPHIL NFR BLD: 3 % (ref 0–7)
ERYTHROCYTE [DISTWIDTH] IN BLOOD BY AUTOMATED COUNT: 13 % (ref 11.5–14.5)
GLOBULIN SER CALC-MCNC: 3.8 G/DL (ref 2–4)
GLUCOSE SERPL-MCNC: 82 MG/DL (ref 65–100)
HCT VFR BLD AUTO: 38.8 % (ref 35–47)
HDLC SERPL-MCNC: 55 MG/DL
HDLC SERPL: 2.8 (ref 0–5)
HGB BLD-MCNC: 12.3 G/DL (ref 11.5–16)
IMM GRANULOCYTES # BLD AUTO: 0 K/UL (ref 0–0.04)
IMM GRANULOCYTES NFR BLD AUTO: 0 % (ref 0–0.5)
LDLC SERPL CALC-MCNC: 77.8 MG/DL (ref 0–100)
LYMPHOCYTES # BLD: 1.4 K/UL (ref 0.8–3.5)
LYMPHOCYTES NFR BLD: 33 % (ref 12–49)
MCH RBC QN AUTO: 31 PG (ref 26–34)
MCHC RBC AUTO-ENTMCNC: 31.7 G/DL (ref 30–36.5)
MCV RBC AUTO: 97.7 FL (ref 80–99)
MONOCYTES # BLD: 0.4 K/UL (ref 0–1)
MONOCYTES NFR BLD: 10 % (ref 5–13)
NEUTS SEG # BLD: 2.3 K/UL (ref 1.8–8)
NEUTS SEG NFR BLD: 53 % (ref 32–75)
NRBC # BLD: 0 K/UL (ref 0–0.01)
NRBC BLD-RTO: 0 PER 100 WBC
PLATELET # BLD AUTO: 162 K/UL (ref 150–400)
PMV BLD AUTO: 11.5 FL (ref 8.9–12.9)
POTASSIUM SERPL-SCNC: 5.2 MMOL/L (ref 3.5–5.1)
PROT SERPL-MCNC: 7.3 G/DL (ref 6.4–8.2)
RBC # BLD AUTO: 3.97 M/UL (ref 3.8–5.2)
TRIGL SERPL-MCNC: 96 MG/DL
URATE SERPL-MCNC: 7.3 MG/DL (ref 2.6–6)
VLDLC SERPL CALC-MCNC: 19.2 MG/DL
WBC # BLD AUTO: 4.3 K/UL (ref 3.6–11)

## 2024-08-01 ENCOUNTER — TELEPHONE (OUTPATIENT)
Age: 74
End: 2024-08-01

## 2024-08-01 DIAGNOSIS — R89.9 ABNORMAL LABORATORY TEST: Primary | ICD-10-CM

## 2024-08-01 LAB — LEVETIRACETAM SERPL-MCNC: 64.5 UG/ML (ref 10–40)

## 2024-08-01 RX ORDER — LEVETIRACETAM 250 MG/1
250 TABLET ORAL 2 TIMES DAILY
Qty: 60 TABLET | Refills: 11 | Status: SHIPPED | OUTPATIENT
Start: 2024-08-01

## 2024-08-01 NOTE — TELEPHONE ENCOUNTER
----- Message from Dariel Hayes MD sent at 8/1/2024 11:33 AM EDT -----  Let's have her stay well hydrated. Recheck BMP next week. BJF

## 2024-08-06 DIAGNOSIS — R89.9 ABNORMAL LABORATORY TEST: ICD-10-CM

## 2024-08-06 LAB
ANION GAP SERPL CALC-SCNC: 3 MMOL/L (ref 5–15)
BUN SERPL-MCNC: 25 MG/DL (ref 6–20)
BUN/CREAT SERPL: 15 (ref 12–20)
CALCIUM SERPL-MCNC: 9.9 MG/DL (ref 8.5–10.1)
CHLORIDE SERPL-SCNC: 111 MMOL/L (ref 97–108)
CO2 SERPL-SCNC: 25 MMOL/L (ref 21–32)
CREAT SERPL-MCNC: 1.66 MG/DL (ref 0.55–1.02)
GLUCOSE SERPL-MCNC: 91 MG/DL (ref 65–100)
POTASSIUM SERPL-SCNC: 5.2 MMOL/L (ref 3.5–5.1)
SODIUM SERPL-SCNC: 139 MMOL/L (ref 136–145)

## 2024-08-07 ENCOUNTER — TELEPHONE (OUTPATIENT)
Age: 74
End: 2024-08-07

## 2024-08-07 DIAGNOSIS — N28.9 ABNORMAL KIDNEY FUNCTION: Primary | ICD-10-CM

## 2024-08-07 NOTE — TELEPHONE ENCOUNTER
Dr. Stephens would like to get most recent o/n as to why she is being seen.      Fax #[phone] Attn: Vaishnavi

## 2024-08-12 ENCOUNTER — OFFICE VISIT (OUTPATIENT)
Age: 74
End: 2024-08-12
Payer: COMMERCIAL

## 2024-08-12 ENCOUNTER — TELEPHONE (OUTPATIENT)
Age: 74
End: 2024-08-12

## 2024-08-12 VITALS
HEIGHT: 62 IN | OXYGEN SATURATION: 100 % | HEART RATE: 65 BPM | RESPIRATION RATE: 16 BRPM | WEIGHT: 179 LBS | DIASTOLIC BLOOD PRESSURE: 66 MMHG | SYSTOLIC BLOOD PRESSURE: 103 MMHG | BODY MASS INDEX: 32.94 KG/M2 | TEMPERATURE: 97.3 F

## 2024-08-12 DIAGNOSIS — R10.13 EPIGASTRIC PAIN: Primary | ICD-10-CM

## 2024-08-12 PROCEDURE — 1036F TOBACCO NON-USER: CPT | Performed by: INTERNAL MEDICINE

## 2024-08-12 PROCEDURE — 3017F COLORECTAL CA SCREEN DOC REV: CPT | Performed by: INTERNAL MEDICINE

## 2024-08-12 PROCEDURE — 1090F PRES/ABSN URINE INCON ASSESS: CPT | Performed by: INTERNAL MEDICINE

## 2024-08-12 PROCEDURE — G8400 PT W/DXA NO RESULTS DOC: HCPCS | Performed by: INTERNAL MEDICINE

## 2024-08-12 PROCEDURE — 1123F ACP DISCUSS/DSCN MKR DOCD: CPT | Performed by: INTERNAL MEDICINE

## 2024-08-12 PROCEDURE — G8427 DOCREV CUR MEDS BY ELIG CLIN: HCPCS | Performed by: INTERNAL MEDICINE

## 2024-08-12 PROCEDURE — 99213 OFFICE O/P EST LOW 20 MIN: CPT | Performed by: INTERNAL MEDICINE

## 2024-08-12 PROCEDURE — 3074F SYST BP LT 130 MM HG: CPT | Performed by: INTERNAL MEDICINE

## 2024-08-12 PROCEDURE — G8417 CALC BMI ABV UP PARAM F/U: HCPCS | Performed by: INTERNAL MEDICINE

## 2024-08-12 PROCEDURE — 3078F DIAST BP <80 MM HG: CPT | Performed by: INTERNAL MEDICINE

## 2024-08-12 RX ORDER — OMEPRAZOLE 20 MG/1
20 CAPSULE, DELAYED RELEASE ORAL
Qty: 90 CAPSULE | Refills: 1 | Status: SHIPPED | OUTPATIENT
Start: 2024-08-12

## 2024-08-12 NOTE — TELEPHONE ENCOUNTER
The patient was seen in the office today, I let her know  sent in her Rx. Patient verbalized understanding of information discussed w/ no further questions at this time.

## 2024-08-12 NOTE — PROGRESS NOTES
PROGRESS NOTE  Name: Michelle Marroquin   : 1950       ASSESSMENT/ PLAN:     Michelle \"Krystle\" was seen today for other.    Diagnoses and all orders for this visit:    Epigastric pain  -     Lipase; Future  -     CBC with Auto Differential; Future  -     Urinalysis with Reflex to Culture; Future  -     US ABDOMEN COMPLETE; Future  -     AFL - Jai Dixon MD, Gastroenterology, [place]  -     omeprazole (PRILOSEC) 20 MG delayed release capsule; Take 1 capsule by mouth every morning (before breakfast)    Return if symptoms worsen or fail to improve.     I have reviewed the patient's medications and risks/side effects/benefits were discussed. Diagnosis(-es) explained to patient and questions answered. Literature provided where appropriate.                       SUBJECTIVE  Ms. Michelle Marroquin presents today acutely for     Chief Complaint   Patient presents with    Other     Stomach pain/ black stool       She has had pain in upper abdomen, severe, keeps her awake at night. She has had some nausea, but no vomiting.     She dates the onset of symptoms to , , when she ate tuna salad at a [employer] women's meeting. She has notice that her stools were black the following Monday.     She has been taking pepto bismol. She has taken TUMS with some relief.    No diarrhea.     OBJECTIVE  /66 (Site: Left Upper Arm, Position: Sitting, Cuff Size: Medium Adult)   Pulse 65   Temp 97.3 °F (36.3 °C) (Temporal)   Resp 16   Ht 1.575 m (5' 2\")   Wt 81.2 kg (179 lb)   SpO2 100%   BMI 32.74 kg/m²   Gen: Pleasant 74 y.o.  female in NAD.   HEENT: PERRLA. EOMI. OP moist and pink.  Neck: Supple.  No LAD.  HEART: RRR, No M/G/R.   LUNGS: CTAB No W/R.   ABDOMEN: S, +TTP epigastrium, ND, BS+.   EXTREMITIES: Warm. No C/C/E. MUSCULOSKELETAL: Normal ROM, muscle strength 5/5 all groups. NEURO: Alert and oriented x 3.  Cranial nerves grossly intact.  No focal sensory or motor deficits noted. SKIN: Warm. Dry. No rashes or other

## 2024-08-12 NOTE — TELEPHONE ENCOUNTER
The patient left a message stating she is having stomach pain.   Let's reach out and see what's going on. Likely will need ER or urgent care.   BJF

## 2024-08-12 NOTE — PROGRESS NOTES
\"Have you been to the ER, urgent care clinic since your last visit?  Hospitalized since your last visit?\"    NO    “Have you seen or consulted any other health care providers outside of [facility] since your last visit?”    NO        “Have you had a colorectal cancer screening such as a colonoscopy/FIT/Cologuard?    Pt stated she has one done before    No colonoscopy on file  No cologuard on file  No FIT/FOBT on file   No flexible sigmoidoscopy on file         Click Here for Release of Records Request

## 2024-08-16 ENCOUNTER — LAB (OUTPATIENT)
Age: 74
End: 2024-08-16

## 2024-08-16 DIAGNOSIS — R10.13 EPIGASTRIC PAIN: ICD-10-CM

## 2024-08-16 LAB
APPEARANCE UR: CLEAR
BACTERIA URNS QL MICRO: NEGATIVE /HPF
BASOPHILS # BLD: 0 K/UL (ref 0–0.1)
BASOPHILS NFR BLD: 1 % (ref 0–1)
BILIRUB UR QL: NEGATIVE
COLOR UR: ABNORMAL
DIFFERENTIAL METHOD BLD: NORMAL
EOSINOPHIL # BLD: 0.1 K/UL (ref 0–0.4)
EOSINOPHIL NFR BLD: 2 % (ref 0–7)
EPITH CASTS URNS QL MICRO: ABNORMAL /LPF
ERYTHROCYTE [DISTWIDTH] IN BLOOD BY AUTOMATED COUNT: 12.6 % (ref 11.5–14.5)
GLUCOSE UR STRIP.AUTO-MCNC: NEGATIVE MG/DL
HCT VFR BLD AUTO: 37.9 % (ref 35–47)
HGB BLD-MCNC: 12.4 G/DL (ref 11.5–16)
HGB UR QL STRIP: ABNORMAL
HYALINE CASTS URNS QL MICRO: ABNORMAL /LPF (ref 0–5)
IMM GRANULOCYTES # BLD AUTO: 0 K/UL (ref 0–0.04)
IMM GRANULOCYTES NFR BLD AUTO: 0 % (ref 0–0.5)
KETONES UR QL STRIP.AUTO: NEGATIVE MG/DL
LEUKOCYTE ESTERASE UR QL STRIP.AUTO: ABNORMAL
LIPASE SERPL-CCNC: 43 U/L (ref 13–75)
LYMPHOCYTES # BLD: 1.6 K/UL (ref 0.8–3.5)
LYMPHOCYTES NFR BLD: 34 % (ref 12–49)
MCH RBC QN AUTO: 31.1 PG (ref 26–34)
MCHC RBC AUTO-ENTMCNC: 32.7 G/DL (ref 30–36.5)
MCV RBC AUTO: 95 FL (ref 80–99)
MONOCYTES # BLD: 0.4 K/UL (ref 0–1)
MONOCYTES NFR BLD: 8 % (ref 5–13)
NEUTS SEG # BLD: 2.7 K/UL (ref 1.8–8)
NEUTS SEG NFR BLD: 55 % (ref 32–75)
NITRITE UR QL STRIP.AUTO: NEGATIVE
NRBC # BLD: 0 K/UL (ref 0–0.01)
NRBC BLD-RTO: 0 PER 100 WBC
PH UR STRIP: 6 (ref 5–8)
PLATELET # BLD AUTO: 182 K/UL (ref 150–400)
PMV BLD AUTO: 10.4 FL (ref 8.9–12.9)
PROT UR STRIP-MCNC: NEGATIVE MG/DL
RBC # BLD AUTO: 3.99 M/UL (ref 3.8–5.2)
RBC #/AREA URNS HPF: ABNORMAL /HPF (ref 0–5)
SP GR UR REFRACTOMETRY: 1.01 (ref 1–1.03)
URINE CULTURE IF INDICATED: ABNORMAL
UROBILINOGEN UR QL STRIP.AUTO: 0.2 EU/DL (ref 0.2–1)
WBC # BLD AUTO: 4.9 K/UL (ref 3.6–11)
WBC URNS QL MICRO: ABNORMAL /HPF (ref 0–4)

## 2024-08-21 ENCOUNTER — TELEPHONE (OUTPATIENT)
Age: 74
End: 2024-08-21

## 2024-08-21 NOTE — TELEPHONE ENCOUNTER
----- Message from Dr. Dariel Hayes MD sent at 8/19/2024  8:31 AM EDT -----  Labs are basically normal. BJF

## 2024-08-21 NOTE — TELEPHONE ENCOUNTER
Caller is returning a call from clinical team regarding LAB RESULTS    Clinical team not available to take the call    Please call pt back.

## 2024-09-09 ENCOUNTER — HOSPITAL ENCOUNTER (OUTPATIENT)
Facility: HOSPITAL | Age: 74
Discharge: HOME | End: 2024-09-12
Attending: INTERNAL MEDICINE
Payer: COMMERCIAL

## 2024-09-09 DIAGNOSIS — N18.31 STAGE 3A CHRONIC KIDNEY DISEASE (HCC): ICD-10-CM

## 2024-09-09 DIAGNOSIS — E87.5 HYPERKALEMIA: ICD-10-CM

## 2024-09-09 DIAGNOSIS — N17.8 OTHER ACUTE KIDNEY FAILURE (HCC): ICD-10-CM

## 2024-09-09 DIAGNOSIS — R10.13 EPIGASTRIC PAIN: ICD-10-CM

## 2024-09-09 DIAGNOSIS — I10 ESSENTIAL HYPERTENSION: ICD-10-CM

## 2024-09-09 DIAGNOSIS — I12.9 CHRONIC KIDNEY DISEASE DUE TO HYPERTENSION: ICD-10-CM

## 2024-09-09 PROCEDURE — 76700 US EXAM ABDOM COMPLETE: CPT

## 2024-10-16 ENCOUNTER — HOSPITAL ENCOUNTER (EMERGENCY)
Facility: HOSPITAL | Age: 74
Discharge: HOME | End: 2024-10-16
Payer: MEDICARE

## 2024-10-16 VITALS
SYSTOLIC BLOOD PRESSURE: 151 MMHG | DIASTOLIC BLOOD PRESSURE: 79 MMHG | OXYGEN SATURATION: 95 % | RESPIRATION RATE: 16 BRPM | HEART RATE: 72 BPM | TEMPERATURE: 97.9 F | BODY MASS INDEX: 26.68 KG/M2 | WEIGHT: 145 LBS | HEIGHT: 62 IN

## 2024-10-16 DIAGNOSIS — M54.42 ACUTE LEFT-SIDED LOW BACK PAIN WITH LEFT-SIDED SCIATICA: Primary | ICD-10-CM

## 2024-10-16 PROCEDURE — 99283 EMERGENCY DEPT VISIT LOW MDM: CPT

## 2024-10-16 RX ORDER — LIDOCAINE 4 G/G
1 PATCH TOPICAL DAILY
Qty: 30 PATCH | Refills: 0 | Status: SHIPPED | OUTPATIENT
Start: 2024-10-16 | End: 2024-11-15

## 2024-10-16 RX ORDER — PREDNISONE 10 MG/1
TABLET ORAL
Qty: 21 TABLET | Refills: 0 | Status: SHIPPED | OUTPATIENT
Start: 2024-10-16 | End: 2024-10-21

## 2024-10-16 RX ORDER — NAPROXEN 500 MG/1
500 TABLET ORAL 2 TIMES DAILY PRN
Qty: 60 TABLET | Refills: 0 | Status: SHIPPED | OUTPATIENT
Start: 2024-10-16

## 2024-10-16 RX ORDER — CYCLOBENZAPRINE HCL 10 MG
10 TABLET ORAL 3 TIMES DAILY PRN
Qty: 21 TABLET | Refills: 0 | Status: SHIPPED | OUTPATIENT
Start: 2024-10-16 | End: 2024-10-26

## 2024-10-16 ASSESSMENT — PAIN - FUNCTIONAL ASSESSMENT: PAIN_FUNCTIONAL_ASSESSMENT: 0-10

## 2024-10-16 ASSESSMENT — PAIN DESCRIPTION - ORIENTATION: ORIENTATION: LEFT

## 2024-10-16 ASSESSMENT — PAIN SCALES - GENERAL: PAINLEVEL_OUTOF10: 10

## 2024-10-16 ASSESSMENT — PAIN DESCRIPTION - LOCATION: LOCATION: HIP

## 2024-10-16 NOTE — DISCHARGE INSTRUCTIONS
It was a pleasure taking care of you at [facility] Emergency Department today.  We know that when you come to [facility], you are entrusting us with your health, comfort, and safety.  Our physicians and nurses honor that trust, and we appreciate the opportunity to care for you and your loved ones.  We also value your feedback, and we would like to hear from you.    If you receive a  >>> survey <<< about your Emergency Department experience today, please fill it out. We review every single response from our patients. Thank you!
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
## 2024-10-22 ENCOUNTER — OFFICE VISIT (OUTPATIENT)
Age: 74
End: 2024-10-22
Payer: COMMERCIAL

## 2024-10-22 VITALS
DIASTOLIC BLOOD PRESSURE: 83 MMHG | RESPIRATION RATE: 16 BRPM | TEMPERATURE: 97 F | OXYGEN SATURATION: 95 % | HEIGHT: 62 IN | HEART RATE: 71 BPM | SYSTOLIC BLOOD PRESSURE: 127 MMHG | BODY MASS INDEX: 32.97 KG/M2 | WEIGHT: 179.2 LBS

## 2024-10-22 DIAGNOSIS — M54.32 LEFT SIDED SCIATICA: Primary | ICD-10-CM

## 2024-10-22 PROCEDURE — 3079F DIAST BP 80-89 MM HG: CPT | Performed by: INTERNAL MEDICINE

## 2024-10-22 PROCEDURE — G8484 FLU IMMUNIZE NO ADMIN: HCPCS | Performed by: INTERNAL MEDICINE

## 2024-10-22 PROCEDURE — G8400 PT W/DXA NO RESULTS DOC: HCPCS | Performed by: INTERNAL MEDICINE

## 2024-10-22 PROCEDURE — 3074F SYST BP LT 130 MM HG: CPT | Performed by: INTERNAL MEDICINE

## 2024-10-22 PROCEDURE — 1090F PRES/ABSN URINE INCON ASSESS: CPT | Performed by: INTERNAL MEDICINE

## 2024-10-22 PROCEDURE — 1036F TOBACCO NON-USER: CPT | Performed by: INTERNAL MEDICINE

## 2024-10-22 PROCEDURE — G8417 CALC BMI ABV UP PARAM F/U: HCPCS | Performed by: INTERNAL MEDICINE

## 2024-10-22 PROCEDURE — 99213 OFFICE O/P EST LOW 20 MIN: CPT | Performed by: INTERNAL MEDICINE

## 2024-10-22 PROCEDURE — 3017F COLORECTAL CA SCREEN DOC REV: CPT | Performed by: INTERNAL MEDICINE

## 2024-10-22 PROCEDURE — G8427 DOCREV CUR MEDS BY ELIG CLIN: HCPCS | Performed by: INTERNAL MEDICINE

## 2024-10-22 PROCEDURE — 1123F ACP DISCUSS/DSCN MKR DOCD: CPT | Performed by: INTERNAL MEDICINE

## 2024-10-22 RX ORDER — CYCLOBENZAPRINE HCL 10 MG
10 TABLET ORAL 3 TIMES DAILY PRN
Qty: 21 TABLET | Refills: 0 | Status: SHIPPED | OUTPATIENT
Start: 2024-10-22 | End: 2024-11-01

## 2024-10-22 RX ORDER — PREDNISONE 10 MG/1
TABLET ORAL
Qty: 21 TABLET | Refills: 0 | Status: SHIPPED | OUTPATIENT
Start: 2024-10-22 | End: 2024-10-27

## 2024-10-22 NOTE — PATIENT INSTRUCTIONS
belly and imagine pulling your navel toward your spine. Hold this for 6 seconds, then relax. Remember to keep breathing normally as you tense your muscles.  Do curl-ups. Always do them with your knees bent. Keep your low back on the floor, and curl your shoulders toward your knees using a smooth, slow motion. Keep your arms folded across your chest. If this bothers your neck, try putting your hands behind your neck (not your head), with your elbows spread apart.  Lie on your back with your knees bent and your feet flat on the floor. Tighten your belly muscles, and then push with your feet and raise your buttocks up a few inches. Hold this position 6 seconds as you continue to breathe normally, then lower yourself slowly to the floor. Repeat 8 to 12 times.  If you like group exercise, try Pilates or yoga. These classes have poses that strengthen the core muscles.  Lead a healthy lifestyle   Stay at a healthy weight to avoid strain on your back.  Do not smoke. Smoking increases the risk of osteoporosis, which weakens the spine. If you need help quitting, talk to your doctor about stop-smoking programs and medicines. These can increase your chances of quitting for good.  Where can you learn more?  Go to https://www.[employer].net/patientEd and enter L315 to learn more about \"Learning About How to Have a Healthy Back.\"  Current as of: July 17, 2023  Content Version: 14.2  © 2024 [employer].   Care instructions adapted under license by [employer]. If you have questions about a medical condition or this instruction, always ask your healthcare professional. [employer] disclaims any warranty or liability for your use of this information.

## 2024-10-22 NOTE — PROGRESS NOTES
PROGRESS NOTE  Name: Michelle Marroquin   : 1950       ASSESSMENT/ PLAN:     Michelle \"Krystle\" was seen today for other.    Diagnoses and all orders for this visit:    Left sided sciatica  -     predniSONE (DELTASONE) 10 MG tablet; Take 6 tablets by mouth daily for 1 day, THEN 5 tablets daily for 1 day, THEN 4 tablets daily for 1 day, THEN 3 tablets daily for 1 day, THEN 2 tablets daily for 1 day, THEN 1 tablet daily for 1 day.  -     cyclobenzaprine (FLEXERIL) 10 MG tablet; Take 1 tablet by mouth 3 times daily as needed for Muscle spasms  -     CALEB - Shankar Adrian MD, Orthopedic Surgery (back, neck, spine), [place]  -     [facility] - Physical Therapy at [facility], [place]    Return in about 4 months (around 2025) for Hypertension.     I have reviewed the patient's medications and risks/side effects/benefits were discussed. Diagnosis(-es) explained to patient and questions answered. Literature provided where appropriate.                       SUBJECTIVE  Ms. Michelle Marroquin presents today acutely for     Chief Complaint   Patient presents with    Other     Pt c/o left hip pain      The pain started last Wednesday (1 week ago). She went to ER on 10/16:   Michelle Marroquin is a 74 y.o. female who presents by POV with complaints of left-sided low back pain that radiates down the left leg.  This started about 3 days ago.  She reports taking a long bus ride to [facility] to see if show.  She just got home at 1 AM today.  While on her trip she has been taking BC powder and using other over-the-counter medications without relief.  She denies numbness, tingling, weakness, saddle paresthesias, incontinence, retention, fever, and chills.  She denies a history of similar complaint.     She was given Naproxen, lidocaine patch, and cyclobenzaprine. She also was given a prednisone.     She is feeling a lot better, but still having pain and discomfort. Worse with walking. Pain waxes and wanes. She requests a refill on the

## 2024-10-22 NOTE — PROGRESS NOTES
\"Have you been to the ER, urgent care clinic since your last visit?  Hospitalized since your last visit?\"    ER/ hip pain

## 2024-12-09 ENCOUNTER — OFFICE VISIT (OUTPATIENT)
Age: 74
End: 2024-12-09
Payer: COMMERCIAL

## 2024-12-09 VITALS
SYSTOLIC BLOOD PRESSURE: 123 MMHG | OXYGEN SATURATION: 97 % | RESPIRATION RATE: 18 BRPM | HEIGHT: 62 IN | DIASTOLIC BLOOD PRESSURE: 79 MMHG | BODY MASS INDEX: 32.76 KG/M2 | WEIGHT: 178 LBS | HEART RATE: 82 BPM | TEMPERATURE: 98.1 F

## 2024-12-09 DIAGNOSIS — M79.10 MUSCLE PAIN: ICD-10-CM

## 2024-12-09 DIAGNOSIS — G47.62 NOCTURNAL LEG CRAMPS: ICD-10-CM

## 2024-12-09 DIAGNOSIS — J06.9 VIRAL URI WITH COUGH: Primary | ICD-10-CM

## 2024-12-09 PROCEDURE — 1123F ACP DISCUSS/DSCN MKR DOCD: CPT | Performed by: INTERNAL MEDICINE

## 2024-12-09 PROCEDURE — 99213 OFFICE O/P EST LOW 20 MIN: CPT | Performed by: INTERNAL MEDICINE

## 2024-12-09 PROCEDURE — 3074F SYST BP LT 130 MM HG: CPT | Performed by: INTERNAL MEDICINE

## 2024-12-09 PROCEDURE — 3078F DIAST BP <80 MM HG: CPT | Performed by: INTERNAL MEDICINE

## 2024-12-09 RX ORDER — LEVETIRACETAM 250 MG/1
250 TABLET ORAL 2 TIMES DAILY
Qty: 60 TABLET | Refills: 11 | Status: SHIPPED | OUTPATIENT
Start: 2024-12-09

## 2024-12-09 RX ORDER — BENZONATATE 100 MG/1
100 CAPSULE ORAL 2 TIMES DAILY PRN
Qty: 20 CAPSULE | Refills: 0 | Status: SHIPPED | OUTPATIENT
Start: 2024-12-09 | End: 2024-12-16

## 2024-12-09 NOTE — PROGRESS NOTES
PROGRESS NOTE  Name: Michelle Marroquin   : 1950       ASSESSMENT/ PLAN:     Michelle \"Krystle\" was seen today for back pain, pharyngitis and generalized body aches.    Diagnoses and all orders for this visit:    Viral URI with cough: Symptomatic measures--rest, fluids, etc.   -     benzonatate (TESSALON) 100 MG capsule; Take 1 capsule by mouth 2 times daily as needed for Cough    Muscle pain due to viral illness.     Nocturnal leg cramps: Discussed warm down exercises,  Handout given.     Seizure history: Needs refill.   -     levETIRAcetam (KEPPRA) 250 MG tablet; Take 1 tablet by mouth 2 times daily    Return in about 3 months (around 3/9/2025) for Hypertension.     I have reviewed the patient's medications and risks/side effects/benefits were discussed. Diagnosis(-es) explained to patient and questions answered. Literature provided where appropriate.                       SUBJECTIVE  MsOpal Marroquin presents today acutely for     Chief Complaint   Patient presents with    Back Pain    Pharyngitis    Generalized Body Aches     She complains of severe pain, in multiple locations--left breast, L shoulder, left shoulder. The pain came on suddenly. She took pepto bismol, thinking it might be gas.     \"I was in so much pain I drove up to [facility] on Friday.\" She had xrays, and \"they said it wasn't gas.\" She was given diclofenac.     She has a sore throat fever. \"My head keeps hurting.\"     She has a frequent dry cough for a couple of days.     Denies fevers, chills, but says \"I stay cold all the time\"--that has been going on for a long time.     \"I get cramps in my legs and have to get up and walk at night.\"      OBJECTIVE  /79 (Site: Right Upper Arm, Position: Sitting, Cuff Size: Medium Adult)   Pulse 82   Temp 98.1 °F (36.7 °C) (Temporal)   Resp 18   Ht 1.575 m (5' 2\")   Wt 80.7 kg (178 lb)   SpO2 97%   BMI 32.56 kg/m²   Gen: Pleasant 74 y.o.  female in NAD.   HEENT: PERRLA. EOMI. OP moist and pink.

## 2024-12-19 ENCOUNTER — TELEPHONE (OUTPATIENT)
Age: 74
End: 2024-12-19

## 2024-12-19 NOTE — TELEPHONE ENCOUNTER
Patient called in stating she had surgery near her groin about 10 years ago and the wound open up every few years and she states it is currently open and red for the past 2 days. Please call to discuss.  No opening available for today and VV not an option for these symptoms

## 2024-12-20 NOTE — TELEPHONE ENCOUNTER
The patient c/o an area on her groin is opening up some. The area looks better than yesterday. She denied a fever, chills, sweats or pain. The area is not red or hot. She would like  to look at the area. Appt scheduled. I recommended if the area s/s worsen she is to go to .Patient verbalized understanding of information discussed w/ no further questions at this time.

## 2025-02-10 DIAGNOSIS — R10.13 EPIGASTRIC PAIN: ICD-10-CM

## 2025-03-07 ENCOUNTER — TELEPHONE (OUTPATIENT)
Age: 75
End: 2025-03-07

## 2025-03-07 NOTE — TELEPHONE ENCOUNTER
Pt needs an appt for DMV forms to be filled out.  These need to be turned in soon.    First appt is in May.  Can we get pt in? Please call to schedule.

## 2025-03-07 NOTE — TELEPHONE ENCOUNTER
----- Message from Zurdo SOLITARIO sent at 3/7/2025  3:31 PM EST -----  Regarding: [facility] Appointment Request  [facility] Appointment Request    Patient needs appointment for [facility] Appointment Type: Existing Condition Follow Up.    Patient Requested Dates(s): As soon as possible  Patient Requested Time: Morning appointment  Provider Name: Dariel Hayes MD    Reason for Appointment Request: Established Patient - Available appointments did not meet patient need    Patient is calling to schedule a routine physical check up for her provider. Available appointments date and time won't works for her because she needs to be seen before March 25, 2025. She also have a paperwork with her that her provider needs to fill out.  --------------------------------------------------------------------------------------------------------------------------    Relationship to Patient: Self     Call Back Information: OK to leave message on voicemail  Preferred Call Back Number: Phone [phone]

## 2025-03-10 NOTE — TELEPHONE ENCOUNTER
Pt states that she is waiting on a call for appt.  I advised that she had been called.      Pt would like a call pertaining to appt request.

## 2025-03-10 NOTE — TELEPHONE ENCOUNTER
Offered next available, pt states would need to be seen before March 24th.     Please call to schedule.

## 2025-03-10 NOTE — TELEPHONE ENCOUNTER
Left generic message. Please schedule next available appointment; unfortunately, no appointment available before 3/24/25. Patient can be placed on cancellation list once scheduled for next available appointment.

## 2025-04-18 ENCOUNTER — OFFICE VISIT (OUTPATIENT)
Age: 75
End: 2025-04-18
Payer: COMMERCIAL

## 2025-04-18 ENCOUNTER — HOSPITAL ENCOUNTER (OUTPATIENT)
Facility: HOSPITAL | Age: 75
Discharge: HOME | End: 2025-04-21
Payer: COMMERCIAL

## 2025-04-18 VITALS
HEIGHT: 62 IN | WEIGHT: 180 LBS | OXYGEN SATURATION: 97 % | BODY MASS INDEX: 33.13 KG/M2 | SYSTOLIC BLOOD PRESSURE: 112 MMHG | TEMPERATURE: 97.8 F | RESPIRATION RATE: 16 BRPM | DIASTOLIC BLOOD PRESSURE: 77 MMHG | HEART RATE: 76 BPM
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
## 2025-04-18 DIAGNOSIS — R25.2 CRAMPS OF RIGHT LOWER EXTREMITY: ICD-10-CM

## 2025-04-18 DIAGNOSIS — Z91.81 AT HIGH RISK FOR FALLS: ICD-10-CM

## 2025-04-18 DIAGNOSIS — I10 PRIMARY HYPERTENSION: ICD-10-CM

## 2025-04-18 PROCEDURE — 3074F SYST BP LT 130 MM HG: CPT | Performed by: STUDENT IN AN ORGANIZED HEALTH CARE EDUCATION/TRAINING PROGRAM

## 2025-04-18 PROCEDURE — 99213 OFFICE O/P EST LOW 20 MIN: CPT | Performed by: STUDENT IN AN ORGANIZED HEALTH CARE EDUCATION/TRAINING PROGRAM

## 2025-04-18 PROCEDURE — 73562 X-RAY EXAM OF KNEE 3: CPT

## 2025-04-18 PROCEDURE — 1123F ACP DISCUSS/DSCN MKR DOCD: CPT | Performed by: STUDENT IN AN ORGANIZED HEALTH CARE EDUCATION/TRAINING PROGRAM

## 2025-04-18 PROCEDURE — 3078F DIAST BP <80 MM HG: CPT | Performed by: STUDENT IN AN ORGANIZED HEALTH CARE EDUCATION/TRAINING PROGRAM

## 2025-04-18 SDOH — ECONOMIC STABILITY: FOOD INSECURITY: WITHIN THE PAST 12 MONTHS, YOU WORRIED THAT YOUR FOOD WOULD RUN OUT BEFORE YOU GOT MONEY TO BUY MORE.: NEVER TRUE

## 2025-04-18 SDOH — ECONOMIC STABILITY: FOOD INSECURITY: WITHIN THE PAST 12 MONTHS, THE FOOD YOU BOUGHT JUST DIDN'T LAST AND YOU DIDN'T HAVE MONEY TO GET MORE.: NEVER TRUE

## 2025-04-18 ASSESSMENT — PATIENT HEALTH QUESTIONNAIRE - PHQ9
SUM OF ALL RESPONSES TO PHQ QUESTIONS 1-9: 0
SUM OF ALL RESPONSES TO PHQ QUESTIONS 1-9: 0
1. LITTLE INTEREST OR PLEASURE IN DOING THINGS: NOT AT ALL
2. FEELING DOWN, DEPRESSED OR HOPELESS: NOT AT ALL
SUM OF ALL RESPONSES TO PHQ QUESTIONS 1-9: 0
SUM OF ALL RESPONSES TO PHQ QUESTIONS 1-9: 0
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
## 2025-04-18 NOTE — PROGRESS NOTES
Identified pt with two pt identifiers(name and ). Reviewed record in preparation for visit and have obtained necessary documentation. All patient medications has been reviewed.    Chief Complaint   Patient presents with    Fall     Occurred on 25- pt experienced bruised hand and arm     Leg Pain     Discuss pain in left leg and cramps in right leg    Cramps     Located in right hand         Vitals:    25 1119   BP: 112/77   BP Site: Left Upper Arm   Patient Position: Sitting   BP Cuff Size: Medium Adult   Pulse: 76   Resp: 16   Temp: 97.8 °F (36.6 °C)   TempSrc: Temporal   SpO2: 97%   Weight: 81.6 kg (180 lb)   Height: 1.575 m (5' 2\")      .  \"Have you been to the ER, urgent care clinic since your last visit?  Hospitalized since your last visit?\"    no    “Have you seen or consulted any other health care providers outside our system since your last visit?”    no

## 2025-04-19 LAB
ALBUMIN SERPL-MCNC: 3.9 G/DL (ref 3.5–5)
ALBUMIN/GLOB SERPL: 1 (ref 1.1–2.2)
ALP SERPL-CCNC: 101 U/L (ref 45–117)
ALT SERPL-CCNC: 26 U/L (ref 12–78)
ANION GAP SERPL CALC-SCNC: 4 MMOL/L (ref 2–12)
AST SERPL-CCNC: 30 U/L (ref 15–37)
BILIRUB SERPL-MCNC: 0.9 MG/DL (ref 0.2–1)
BUN SERPL-MCNC: 22 MG/DL (ref 6–20)
BUN/CREAT SERPL: 16 (ref 12–20)
CALCIUM SERPL-MCNC: 9.6 MG/DL (ref 8.5–10.1)
CHLORIDE SERPL-SCNC: 107 MMOL/L (ref 97–108)
CK SERPL-CCNC: 180 U/L (ref 26–192)
CO2 SERPL-SCNC: 28 MMOL/L (ref 21–32)
CREAT SERPL-MCNC: 1.39 MG/DL (ref 0.55–1.02)
GLOBULIN SER CALC-MCNC: 3.8 G/DL (ref 2–4)
GLUCOSE SERPL-MCNC: 85 MG/DL (ref 65–100)
MAGNESIUM SERPL-MCNC: 2 MG/DL (ref 1.6–2.4)
POTASSIUM SERPL-SCNC: 4.9 MMOL/L (ref 3.5–5.1)
PROT SERPL-MCNC: 7.7 G/DL (ref 6.4–8.2)
SODIUM SERPL-SCNC: 139 MMOL/L (ref 136–145)

## 2025-04-21 ENCOUNTER — RESULTS FOLLOW-UP (OUTPATIENT)
Age: 75
End: 2025-04-21

## 2025-04-23 NOTE — TELEPHONE ENCOUNTER
Caller is returning a call from clinical team regarding xray results    Clinical team not available to take the call    Please call pt back.

## 2025-04-23 NOTE — RESULT ENCOUNTER NOTE
Received two pt identifiers   Patient notified of results and response from Dariel Hayes MD    States understanding       Next appt 5/14/2025

## 2025-05-27 ENCOUNTER — OFFICE VISIT (OUTPATIENT)
Age: 75
End: 2025-05-27
Payer: COMMERCIAL

## 2025-05-27 VITALS
SYSTOLIC BLOOD PRESSURE: 104 MMHG | BODY MASS INDEX: 32.46 KG/M2 | DIASTOLIC BLOOD PRESSURE: 67 MMHG | OXYGEN SATURATION: 100 % | WEIGHT: 176.4 LBS | TEMPERATURE: 97.3 F | HEART RATE: 68 BPM | RESPIRATION RATE: 16 BRPM | HEIGHT: 62 IN

## 2025-05-27 DIAGNOSIS — E78.2 MIXED HYPERLIPIDEMIA: ICD-10-CM

## 2025-05-27 DIAGNOSIS — R56.9 SEIZURE (HCC): ICD-10-CM

## 2025-05-27 DIAGNOSIS — K75.81 NASH (NONALCOHOLIC STEATOHEPATITIS): ICD-10-CM

## 2025-05-27 DIAGNOSIS — I25.10 ATHEROSCLEROSIS OF NATIVE CORONARY ARTERY OF NATIVE HEART WITHOUT ANGINA PECTORIS: ICD-10-CM

## 2025-05-27 DIAGNOSIS — I10 ESSENTIAL (PRIMARY) HYPERTENSION: ICD-10-CM

## 2025-05-27 DIAGNOSIS — M10.9 GOUT, UNSPECIFIED CAUSE, UNSPECIFIED CHRONICITY, UNSPECIFIED SITE: ICD-10-CM

## 2025-05-27 DIAGNOSIS — I10 PRIMARY HYPERTENSION: ICD-10-CM

## 2025-05-27 DIAGNOSIS — M54.9 BACK PAIN, UNSPECIFIED BACK LOCATION, UNSPECIFIED BACK PAIN LATERALITY, UNSPECIFIED CHRONICITY: Primary | ICD-10-CM

## 2025-05-27 PROCEDURE — 3078F DIAST BP <80 MM HG: CPT | Performed by: INTERNAL MEDICINE

## 2025-05-27 PROCEDURE — 1123F ACP DISCUSS/DSCN MKR DOCD: CPT | Performed by: INTERNAL MEDICINE

## 2025-05-27 PROCEDURE — 99214 OFFICE O/P EST MOD 30 MIN: CPT | Performed by: INTERNAL MEDICINE

## 2025-05-27 PROCEDURE — 3074F SYST BP LT 130 MM HG: CPT | Performed by: INTERNAL MEDICINE

## 2025-05-27 RX ORDER — TIZANIDINE 2 MG/1
2 TABLET ORAL EVERY 6 HOURS PRN
Qty: 60 TABLET | Refills: 1 | Status: SHIPPED | OUTPATIENT
Start: 2025-05-27
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
## 2025-06-10 NOTE — TELEPHONE ENCOUNTER
Identified patient 2 identifiers verified. Patient aware of new medications and percocet script is ready for .
Identified patient 2 identifiers verified. Patient still taking Flexeril and reports that she is still in pain and not sleeping, would like something else prescribed.
Is she on flexeril right now or something else?
Patient was last seen 1/4/19
Prescription done. Let patient know.
Pt would need a prescription for a stronger muscle relaxer Sent to [facility] on [address]. The muscle relaxer currently prescribed is not working.  Pts contact .         copy/paste Daisy Avila
She may continue tylenol, flexeril, gabapentin. We'll add Rx for meloxicam.   We'll give small amount of percocet for breakthrough pain.   (Tramadol is out due to seizure. )  BJF
25 feet

## 2025-07-03 RX ORDER — AMLODIPINE BESYLATE 5 MG/1
5 TABLET ORAL DAILY
Qty: 90 TABLET | Refills: 3 | Status: SHIPPED | OUTPATIENT
Start: 2025-07-03

## 2025-07-11 DIAGNOSIS — I10 ESSENTIAL (PRIMARY) HYPERTENSION: ICD-10-CM

## 2025-07-11 DIAGNOSIS — E78.2 MIXED HYPERLIPIDEMIA: Primary | ICD-10-CM

## 2025-07-11 NOTE — TELEPHONE ENCOUNTER
Future Appointments:  No future appointments.     Last Appointment With Me:  5/27/2025     Requested Prescriptions     Pending Prescriptions Disp Refills    spironolactone (ALDACTONE) 25 MG tablet 90 tablet 2     Sig: Take 1 tablet by mouth daily    rosuvastatin (CRESTOR) 10 MG tablet 90 tablet 2     Sig: Take 1 tablet by mouth daily    losartan (COZAAR) 100 MG tablet 90 tablet 2     Sig: Take 1 tablet by mouth daily

## 2025-07-11 NOTE — TELEPHONE ENCOUNTER
losartan (COZAAR) 100 MG tablet    rosuvastatin (CRESTOR) 10 MG tablet     spironolactone (ALDACTONE) 25 MG tablet     Refills to [facility] #[phone]

## 2025-07-14 RX ORDER — SPIRONOLACTONE 25 MG/1
25 TABLET ORAL DAILY
Qty: 90 TABLET | Refills: 2 | Status: SHIPPED | OUTPATIENT
Start: 2025-07-14

## 2025-07-14 RX ORDER — ROSUVASTATIN CALCIUM 10 MG/1
10 TABLET, COATED ORAL DAILY
Qty: 90 TABLET | Refills: 2 | Status: SHIPPED | OUTPATIENT
Start: 2025-07-14

## 2025-07-14 RX ORDER — LOSARTAN POTASSIUM 100 MG/1
100 TABLET ORAL DAILY
Qty: 90 TABLET | Refills: 2 | Status: SHIPPED | OUTPATIENT
Start: 2025-07-14

## 2025-08-01 ENCOUNTER — OFFICE VISIT (OUTPATIENT)
Age: 75
End: 2025-08-01
Payer: COMMERCIAL

## 2025-08-01 VITALS
TEMPERATURE: 97.5 F | BODY MASS INDEX: 32.79 KG/M2 | DIASTOLIC BLOOD PRESSURE: 80 MMHG | OXYGEN SATURATION: 100 % | SYSTOLIC BLOOD PRESSURE: 122 MMHG | RESPIRATION RATE: 16 BRPM | HEIGHT: 62 IN | HEART RATE: 63 BPM | WEIGHT: 178.2 LBS

## 2025-08-01 DIAGNOSIS — M25.551 RIGHT HIP PAIN: Primary | ICD-10-CM

## 2025-08-01 PROCEDURE — 3074F SYST BP LT 130 MM HG: CPT | Performed by: INTERNAL MEDICINE

## 2025-08-01 PROCEDURE — 1123F ACP DISCUSS/DSCN MKR DOCD: CPT | Performed by: INTERNAL MEDICINE

## 2025-08-01 PROCEDURE — 99213 OFFICE O/P EST LOW 20 MIN: CPT | Performed by: INTERNAL MEDICINE

## 2025-08-01 PROCEDURE — 3079F DIAST BP 80-89 MM HG: CPT | Performed by: INTERNAL MEDICINE

## 2025-08-01 RX ORDER — LIDOCAINE 50 MG/G
1 PATCH TOPICAL DAILY
Qty: 30 PATCH | Refills: 3 | Status: SHIPPED | OUTPATIENT
Start: 2025-08-01

## 2025-08-01 NOTE — PROGRESS NOTES
PROGRESS NOTE  Name: Michelle Marroquin   : 1950       ASSESSMENT/ PLAN:     Michelle \"Krystle\" was seen today for hip pain.    Diagnoses and all orders for this visit:    Right hip pain  -     [facility] - Rick Bedoya MD, Orthopedic Surgery (sports medicine), [place]  -     lidocaine (LIDODERM) 5 %; Place 1 patch onto the skin daily 12 hours on, 12 hours off.    Return in about 4 months (around 2025) for Hypertension.     I have reviewed the patient's medications and risks/side effects/benefits were discussed. Diagnosis(-es) explained to patient and questions answered. Literature provided where appropriate.                       SUBJECTIVE  Ms. Michelle Marroquin presents today acutely for     Chief Complaint   Patient presents with    Hip Pain     right     She has had severe R hip pain for about 2-3 weeks. She is using Biofreeze, and \"that arthritis gel.\" These have helped some.  She takes tylenol daily, as well as ibuprofen \"once in a while.\"     OBJECTIVE  /80 (BP Site: Left Upper Arm, Patient Position: Sitting, BP Cuff Size: Medium Adult)   Pulse 63   Temp 97.5 °F (36.4 °C) (Temporal)   Resp 16   Ht 1.575 m (5' 2\")   Wt 80.8 kg (178 lb 3.2 oz)   SpO2 100%   BMI 32.59 kg/m²   Gen: Pleasant 75 y.o. obese female in NAD.  She ambulates with a cane.   EXTREMITIES: Warm. No C/C/E. MUSCULOSKELETAL: She has tenderness to palpation in the posterior aspect of her right hip.  Her range of motion is limited slightly by pain.  She walks with an antalgic gait.

## 2025-08-04 RX ORDER — LEVETIRACETAM 250 MG/1
250 TABLET ORAL 2 TIMES DAILY
Qty: 60 TABLET | Refills: 11 | Status: SHIPPED | OUTPATIENT
Start: 2025-08-04

## 2025-08-07 DIAGNOSIS — R10.13 EPIGASTRIC PAIN: ICD-10-CM

## 2025-08-07 RX ORDER — OMEPRAZOLE 20 MG/1
20 CAPSULE, DELAYED RELEASE ORAL
Qty: 90 CAPSULE | Refills: 1 | Status: SHIPPED | OUTPATIENT
Start: 2025-08-07